# Patient Record
Sex: FEMALE | Race: WHITE | NOT HISPANIC OR LATINO | Employment: UNEMPLOYED | URBAN - METROPOLITAN AREA
[De-identification: names, ages, dates, MRNs, and addresses within clinical notes are randomized per-mention and may not be internally consistent; named-entity substitution may affect disease eponyms.]

---

## 2018-12-16 ENCOUNTER — OFFICE VISIT - RIVER FALLS (OUTPATIENT)
Dept: FAMILY MEDICINE | Facility: CLINIC | Age: 48
End: 2018-12-16

## 2018-12-24 ENCOUNTER — OFFICE VISIT - RIVER FALLS (OUTPATIENT)
Dept: FAMILY MEDICINE | Facility: CLINIC | Age: 48
End: 2018-12-24

## 2019-01-13 ENCOUNTER — OFFICE VISIT - RIVER FALLS (OUTPATIENT)
Dept: FAMILY MEDICINE | Facility: CLINIC | Age: 49
End: 2019-01-13

## 2019-01-14 ENCOUNTER — OFFICE VISIT - RIVER FALLS (OUTPATIENT)
Dept: FAMILY MEDICINE | Facility: CLINIC | Age: 49
End: 2019-01-14

## 2019-01-16 LAB
ANA SER QL IA: NEGATIVE
Lab: NORMAL
Lab: NORMAL
RHEUMATOID FACT SER NEPH-ACNC: <14 IU/ML

## 2019-03-04 ENCOUNTER — AMBULATORY - RIVER FALLS (OUTPATIENT)
Dept: FAMILY MEDICINE | Facility: CLINIC | Age: 49
End: 2019-03-04

## 2019-03-05 LAB
BUN SERPL-MCNC: 18 MG/DL (ref 7–25)
BUN/CREAT RATIO - HISTORICAL: NORMAL (ref 6–22)
CALCIUM SERPL-MCNC: 9.4 MG/DL (ref 8.6–10.2)
CHLORIDE BLD-SCNC: 103 MMOL/L (ref 98–110)
CHOLEST SERPL-MCNC: 181 MG/DL
CHOLEST/HDLC SERPL: 2.3 {RATIO}
CO2 SERPL-SCNC: 23 MMOL/L (ref 20–32)
CREAT SERPL-MCNC: 0.84 MG/DL (ref 0.5–1.1)
EGFRCR SERPLBLD CKD-EPI 2021: 83 ML/MIN/1.73M2
GLUCOSE BLD-MCNC: 85 MG/DL (ref 65–99)
HBA1C MFR BLD: 5.2 %
HDLC SERPL-MCNC: 78 MG/DL
LDLC SERPL CALC-MCNC: 84 MG/DL
NONHDLC SERPL-MCNC: 103 MG/DL
POTASSIUM BLD-SCNC: 4 MMOL/L (ref 3.5–5.3)
SODIUM SERPL-SCNC: 137 MMOL/L (ref 135–146)
TRIGL SERPL-MCNC: 101 MG/DL
TSH SERPL DL<=0.005 MIU/L-ACNC: 1.39 MIU/L

## 2019-03-10 ENCOUNTER — OFFICE VISIT - RIVER FALLS (OUTPATIENT)
Dept: FAMILY MEDICINE | Facility: CLINIC | Age: 49
End: 2019-03-10

## 2019-03-10 ASSESSMENT — MIFFLIN-ST. JEOR: SCORE: 1545.07

## 2019-03-12 ENCOUNTER — OFFICE VISIT - RIVER FALLS (OUTPATIENT)
Dept: FAMILY MEDICINE | Facility: CLINIC | Age: 49
End: 2019-03-12

## 2019-03-12 ASSESSMENT — MIFFLIN-ST. JEOR: SCORE: 1524.21

## 2019-03-16 LAB — HPV HIGH RISK: NOT DETECTED

## 2019-03-29 ENCOUNTER — OFFICE VISIT - RIVER FALLS (OUTPATIENT)
Dept: FAMILY MEDICINE | Facility: CLINIC | Age: 49
End: 2019-03-29

## 2019-04-04 ENCOUNTER — OFFICE VISIT - RIVER FALLS (OUTPATIENT)
Dept: FAMILY MEDICINE | Facility: CLINIC | Age: 49
End: 2019-04-04

## 2019-04-04 ASSESSMENT — MIFFLIN-ST. JEOR: SCORE: 1545.98

## 2019-04-10 ENCOUNTER — OFFICE VISIT - RIVER FALLS (OUTPATIENT)
Dept: FAMILY MEDICINE | Facility: CLINIC | Age: 49
End: 2019-04-10

## 2019-04-15 ENCOUNTER — OFFICE VISIT - RIVER FALLS (OUTPATIENT)
Dept: FAMILY MEDICINE | Facility: CLINIC | Age: 49
End: 2019-04-15

## 2019-04-17 ENCOUNTER — OFFICE VISIT - RIVER FALLS (OUTPATIENT)
Dept: FAMILY MEDICINE | Facility: CLINIC | Age: 49
End: 2019-04-17

## 2019-04-22 ENCOUNTER — OFFICE VISIT - RIVER FALLS (OUTPATIENT)
Dept: FAMILY MEDICINE | Facility: CLINIC | Age: 49
End: 2019-04-22

## 2019-04-22 ASSESSMENT — MIFFLIN-ST. JEOR: SCORE: 1543.26

## 2019-05-07 ENCOUNTER — OFFICE VISIT - RIVER FALLS (OUTPATIENT)
Dept: FAMILY MEDICINE | Facility: CLINIC | Age: 49
End: 2019-05-07

## 2019-05-08 LAB
A/G RATIO - HISTORICAL: 1.4 (ref 1–2.5)
ALBUMIN SERPL-MCNC: 3.8 GM/DL (ref 3.6–5.1)
ALP SERPL-CCNC: 81 UNIT/L (ref 33–115)
ALT SERPL W P-5'-P-CCNC: 15 UNIT/L (ref 6–29)
AST SERPL W P-5'-P-CCNC: 17 UNIT/L (ref 10–35)
BILIRUB SERPL-MCNC: 0.4 MG/DL (ref 0.2–1.2)
BUN SERPL-MCNC: 16 MG/DL (ref 7–25)
BUN/CREAT RATIO - HISTORICAL: NORMAL (ref 6–22)
CALCIUM SERPL-MCNC: 8.9 MG/DL (ref 8.6–10.2)
CHLORIDE BLD-SCNC: 106 MMOL/L (ref 98–110)
CO2 SERPL-SCNC: 23 MMOL/L (ref 20–32)
CREAT SERPL-MCNC: 0.8 MG/DL (ref 0.5–1.1)
EGFRCR SERPLBLD CKD-EPI 2021: 88 ML/MIN/1.73M2
GLOBULIN: 2.7 (ref 1.9–3.7)
GLUCOSE BLD-MCNC: 76 MG/DL (ref 65–99)
POTASSIUM BLD-SCNC: 4.2 MMOL/L (ref 3.5–5.3)
PROT SERPL-MCNC: 6.5 GM/DL (ref 6.1–8.1)
SODIUM SERPL-SCNC: 138 MMOL/L (ref 135–146)
T3FREE SERPL-MCNC: 2.9 PG/ML (ref 2.3–4.2)
T4 FREE SERPL-MCNC: 0.8 NG/DL (ref 0.8–1.8)
TSH SERPL DL<=0.005 MIU/L-ACNC: 2.54 MIU/L

## 2019-05-10 ENCOUNTER — COMMUNICATION - RIVER FALLS (OUTPATIENT)
Dept: FAMILY MEDICINE | Facility: CLINIC | Age: 49
End: 2019-05-10

## 2019-08-01 ENCOUNTER — OFFICE VISIT - RIVER FALLS (OUTPATIENT)
Dept: FAMILY MEDICINE | Facility: CLINIC | Age: 49
End: 2019-08-01

## 2019-10-28 ENCOUNTER — AMBULATORY - RIVER FALLS (OUTPATIENT)
Dept: FAMILY MEDICINE | Facility: CLINIC | Age: 49
End: 2019-10-28

## 2020-01-31 ENCOUNTER — OFFICE VISIT - RIVER FALLS (OUTPATIENT)
Dept: FAMILY MEDICINE | Facility: CLINIC | Age: 50
End: 2020-01-31

## 2020-03-13 ENCOUNTER — OFFICE VISIT - RIVER FALLS (OUTPATIENT)
Dept: FAMILY MEDICINE | Facility: CLINIC | Age: 50
End: 2020-03-13

## 2020-03-13 ASSESSMENT — MIFFLIN-ST. JEOR: SCORE: 1562.31

## 2020-09-03 ENCOUNTER — COMMUNICATION - RIVER FALLS (OUTPATIENT)
Dept: FAMILY MEDICINE | Facility: CLINIC | Age: 50
End: 2020-09-03

## 2020-09-10 ENCOUNTER — OFFICE VISIT - RIVER FALLS (OUTPATIENT)
Dept: FAMILY MEDICINE | Facility: CLINIC | Age: 50
End: 2020-09-10

## 2020-09-11 ENCOUNTER — TRANSCRIBE ORDERS (OUTPATIENT)
Dept: OTHER | Age: 50
End: 2020-09-11

## 2020-09-11 DIAGNOSIS — H55.00 NYSTAGMUS: ICD-10-CM

## 2020-09-11 DIAGNOSIS — Z98.890 HISTORY OF EYE SURGERY: ICD-10-CM

## 2020-09-11 DIAGNOSIS — H53.19 OSCILLOPSIA: Primary | ICD-10-CM

## 2020-09-11 DIAGNOSIS — H50.9 STRABISMUS: ICD-10-CM

## 2021-01-25 ENCOUNTER — OFFICE VISIT - RIVER FALLS (OUTPATIENT)
Dept: FAMILY MEDICINE | Facility: CLINIC | Age: 51
End: 2021-01-25

## 2021-01-25 ASSESSMENT — MIFFLIN-ST. JEOR: SCORE: 1546.22

## 2021-02-08 ENCOUNTER — OFFICE VISIT - RIVER FALLS (OUTPATIENT)
Dept: FAMILY MEDICINE | Facility: CLINIC | Age: 51
End: 2021-02-08

## 2021-02-15 ENCOUNTER — OFFICE VISIT - RIVER FALLS (OUTPATIENT)
Dept: FAMILY MEDICINE | Facility: CLINIC | Age: 51
End: 2021-02-15

## 2021-03-22 ENCOUNTER — OFFICE VISIT - RIVER FALLS (OUTPATIENT)
Dept: FAMILY MEDICINE | Facility: CLINIC | Age: 51
End: 2021-03-22

## 2021-03-22 ASSESSMENT — MIFFLIN-ST. JEOR: SCORE: 1542.59

## 2021-04-06 ENCOUNTER — OFFICE VISIT - RIVER FALLS (OUTPATIENT)
Dept: FAMILY MEDICINE | Facility: CLINIC | Age: 51
End: 2021-04-06

## 2021-04-13 ENCOUNTER — OFFICE VISIT - RIVER FALLS (OUTPATIENT)
Dept: FAMILY MEDICINE | Facility: CLINIC | Age: 51
End: 2021-04-13

## 2021-04-20 ENCOUNTER — COMMUNICATION - RIVER FALLS (OUTPATIENT)
Dept: FAMILY MEDICINE | Facility: CLINIC | Age: 51
End: 2021-04-20

## 2021-04-21 ENCOUNTER — OFFICE VISIT - RIVER FALLS (OUTPATIENT)
Dept: FAMILY MEDICINE | Facility: CLINIC | Age: 51
End: 2021-04-21

## 2021-04-21 ASSESSMENT — MIFFLIN-ST. JEOR: SCORE: 1558.92

## 2021-04-29 ENCOUNTER — AMBULATORY - RIVER FALLS (OUTPATIENT)
Dept: FAMILY MEDICINE | Facility: CLINIC | Age: 51
End: 2021-04-29

## 2021-05-19 ENCOUNTER — OFFICE VISIT - RIVER FALLS (OUTPATIENT)
Dept: FAMILY MEDICINE | Facility: CLINIC | Age: 51
End: 2021-05-19

## 2021-05-19 ASSESSMENT — MIFFLIN-ST. JEOR: SCORE: 1555.74

## 2021-05-27 ENCOUNTER — COMMUNICATION - RIVER FALLS (OUTPATIENT)
Dept: FAMILY MEDICINE | Facility: CLINIC | Age: 51
End: 2021-05-27

## 2021-05-27 ENCOUNTER — AMBULATORY - RIVER FALLS (OUTPATIENT)
Dept: FAMILY MEDICINE | Facility: CLINIC | Age: 51
End: 2021-05-27

## 2021-06-25 ENCOUNTER — COMMUNICATION - RIVER FALLS (OUTPATIENT)
Dept: FAMILY MEDICINE | Facility: CLINIC | Age: 51
End: 2021-06-25

## 2021-06-25 ENCOUNTER — OFFICE VISIT - RIVER FALLS (OUTPATIENT)
Dept: FAMILY MEDICINE | Facility: CLINIC | Age: 51
End: 2021-06-25

## 2021-06-26 LAB
BUN SERPL-MCNC: 14 MG/DL (ref 7–25)
BUN/CREAT RATIO - HISTORICAL: NORMAL (ref 6–22)
CALCIUM SERPL-MCNC: 9.2 MG/DL (ref 8.6–10.2)
CHLORIDE BLD-SCNC: 107 MMOL/L (ref 98–110)
CO2 SERPL-SCNC: 23 MMOL/L (ref 20–32)
CREAT SERPL-MCNC: 0.85 MG/DL (ref 0.5–1.1)
EGFRCR SERPLBLD CKD-EPI 2021: 80 ML/MIN/1.73M2
ERYTHROCYTE [DISTWIDTH] IN BLOOD BY AUTOMATED COUNT: 13.5 % (ref 11–15)
ERYTHROCYTE [SEDIMENTATION RATE] IN BLOOD BY WESTERGREN METHOD: 6 MM/H
GLUCOSE BLD-MCNC: 84 MG/DL (ref 65–99)
HCT VFR BLD AUTO: 30.4 % (ref 35–45)
HGB BLD-MCNC: 9.5 GM/DL (ref 11.7–15.5)
MCH RBC QN AUTO: 25.4 PG (ref 27–33)
MCHC RBC AUTO-ENTMCNC: 31.3 GM/DL (ref 32–36)
MCV RBC AUTO: 81.3 FL (ref 80–100)
PLATELET # BLD AUTO: 339 10*3/UL (ref 140–400)
PMV BLD: 10.2 FL (ref 7.5–12.5)
POTASSIUM BLD-SCNC: 4.2 MMOL/L (ref 3.5–5.3)
RBC # BLD AUTO: 3.74 10*6/UL (ref 3.8–5.1)
SODIUM SERPL-SCNC: 137 MMOL/L (ref 135–146)
WBC # BLD AUTO: 7 10*3/UL (ref 3.8–10.8)

## 2021-06-30 ENCOUNTER — COMMUNICATION - RIVER FALLS (OUTPATIENT)
Dept: FAMILY MEDICINE | Facility: CLINIC | Age: 51
End: 2021-06-30

## 2021-07-01 ENCOUNTER — COMMUNICATION - RIVER FALLS (OUTPATIENT)
Dept: FAMILY MEDICINE | Facility: CLINIC | Age: 51
End: 2021-07-01

## 2021-07-05 ENCOUNTER — OFFICE VISIT - RIVER FALLS (OUTPATIENT)
Dept: FAMILY MEDICINE | Facility: CLINIC | Age: 51
End: 2021-07-05

## 2021-07-05 ASSESSMENT — MIFFLIN-ST. JEOR: SCORE: 1550.75

## 2021-08-02 ENCOUNTER — TRANSFERRED RECORDS (OUTPATIENT)
Dept: HEALTH INFORMATION MANAGEMENT | Facility: CLINIC | Age: 51
End: 2021-08-02

## 2021-08-30 ENCOUNTER — COMMUNICATION - RIVER FALLS (OUTPATIENT)
Dept: FAMILY MEDICINE | Facility: CLINIC | Age: 51
End: 2021-08-30

## 2021-08-31 ENCOUNTER — AMBULATORY - RIVER FALLS (OUTPATIENT)
Dept: FAMILY MEDICINE | Facility: CLINIC | Age: 51
End: 2021-08-31

## 2021-09-01 LAB
% RETIC: 2.2 %
FERRITIN SERPL-MCNC: 31 NG/ML (ref 16–232)
HGB BLD-MCNC: 12.1 GM/DL (ref 11.7–15.5)
RETIC (ABSOLUTE) - HISTORICAL: ABNORMAL (ref 20000–80000)

## 2021-09-02 ENCOUNTER — COMMUNICATION - RIVER FALLS (OUTPATIENT)
Dept: FAMILY MEDICINE | Facility: CLINIC | Age: 51
End: 2021-09-02

## 2021-10-04 ENCOUNTER — OFFICE VISIT - RIVER FALLS (OUTPATIENT)
Dept: FAMILY MEDICINE | Facility: CLINIC | Age: 51
End: 2021-10-04

## 2021-10-04 ASSESSMENT — MIFFLIN-ST. JEOR: SCORE: 1557.1

## 2021-10-17 ENCOUNTER — COMMUNICATION - RIVER FALLS (OUTPATIENT)
Dept: FAMILY MEDICINE | Facility: CLINIC | Age: 51
End: 2021-10-17

## 2021-10-19 ENCOUNTER — OFFICE VISIT - RIVER FALLS (OUTPATIENT)
Dept: FAMILY MEDICINE | Facility: CLINIC | Age: 51
End: 2021-10-19

## 2021-10-19 ASSESSMENT — MIFFLIN-ST. JEOR: SCORE: 1575.7

## 2021-11-03 ENCOUNTER — OFFICE VISIT - RIVER FALLS (OUTPATIENT)
Dept: FAMILY MEDICINE | Facility: CLINIC | Age: 51
End: 2021-11-03

## 2021-11-03 ASSESSMENT — MIFFLIN-ST. JEOR: SCORE: 1569.8

## 2021-11-22 ENCOUNTER — VIRTUAL VISIT (OUTPATIENT)
Dept: BEHAVIORAL HEALTH | Facility: TELEHEALTH | Age: 51
End: 2021-11-22
Payer: COMMERCIAL

## 2021-11-22 DIAGNOSIS — F41.9 ANXIETY DISORDER, UNSPECIFIED TYPE: Primary | ICD-10-CM

## 2021-11-22 PROCEDURE — 90832 PSYTX W PT 30 MINUTES: CPT | Mod: 95 | Performed by: SOCIAL WORKER

## 2021-11-22 ASSESSMENT — COLUMBIA-SUICIDE SEVERITY RATING SCALE - C-SSRS
TOTAL  NUMBER OF INTERRUPTED ATTEMPTS PAST 3 MONTHS: NO
TOTAL  NUMBER OF ABORTED OR SELF INTERRUPTED ATTEMPTS PAST LIFETIME: NO
2. HAVE YOU ACTUALLY HAD ANY THOUGHTS OF KILLING YOURSELF LIFETIME?: NO
5. HAVE YOU STARTED TO WORK OUT OR WORKED OUT THE DETAILS OF HOW TO KILL YOURSELF? DO YOU INTEND TO CARRY OUT THIS PLAN?: NO
ATTEMPT LIFETIME: NO
1. IN THE PAST MONTH, HAVE YOU WISHED YOU WERE DEAD OR WISHED YOU COULD GO TO SLEEP AND NOT WAKE UP?: NO
ATTEMPT PAST THREE MONTHS: NO
5. HAVE YOU STARTED TO WORK OUT OR WORKED OUT THE DETAILS OF HOW TO KILL YOURSELF? DO YOU INTEND TO CARRY OUT THIS PLAN?: NO
6. HAVE YOU EVER DONE ANYTHING, STARTED TO DO ANYTHING, OR PREPARED TO DO ANYTHING TO END YOUR LIFE?: NO
TOTAL  NUMBER OF ABORTED OR SELF INTERRUPTED ATTEMPTS PAST 3 MONTHS: NO
TOTAL  NUMBER OF INTERRUPTED ATTEMPTS LIFETIME: NO
3. HAVE YOU BEEN THINKING ABOUT HOW YOU MIGHT KILL YOURSELF?: NO
2. HAVE YOU ACTUALLY HAD ANY THOUGHTS OF KILLING YOURSELF?: NO
1. IN THE PAST MONTH, HAVE YOU WISHED YOU WERE DEAD OR WISHED YOU COULD GO TO SLEEP AND NOT WAKE UP?: NO
4. HAVE YOU HAD THESE THOUGHTS AND HAD SOME INTENTION OF ACTING ON THEM?: NO
4. HAVE YOU HAD THESE THOUGHTS AND HAD SOME INTENTION OF ACTING ON THEM?: NO
6. HAVE YOU EVER DONE ANYTHING, STARTED TO DO ANYTHING, OR PREPARED TO DO ANYTHING TO END YOUR LIFE?: NO

## 2021-11-22 ASSESSMENT — ANXIETY QUESTIONNAIRES
6. BECOMING EASILY ANNOYED OR IRRITABLE: MORE THAN HALF THE DAYS
4. TROUBLE RELAXING: NEARLY EVERY DAY
GAD7 TOTAL SCORE: 17
8. IF YOU CHECKED OFF ANY PROBLEMS, HOW DIFFICULT HAVE THESE MADE IT FOR YOU TO DO YOUR WORK, TAKE CARE OF THINGS AT HOME, OR GET ALONG WITH OTHER PEOPLE?: VERY DIFFICULT
1. FEELING NERVOUS, ANXIOUS, OR ON EDGE: NEARLY EVERY DAY
2. NOT BEING ABLE TO STOP OR CONTROL WORRYING: NEARLY EVERY DAY
5. BEING SO RESTLESS THAT IT IS HARD TO SIT STILL: MORE THAN HALF THE DAYS
GAD7 TOTAL SCORE: 17
7. FEELING AFRAID AS IF SOMETHING AWFUL MIGHT HAPPEN: SEVERAL DAYS
GAD7 TOTAL SCORE: 17
3. WORRYING TOO MUCH ABOUT DIFFERENT THINGS: NEARLY EVERY DAY
7. FEELING AFRAID AS IF SOMETHING AWFUL MIGHT HAPPEN: SEVERAL DAYS

## 2021-11-22 ASSESSMENT — PATIENT HEALTH QUESTIONNAIRE - PHQ9
SUM OF ALL RESPONSES TO PHQ QUESTIONS 1-9: 17
10. IF YOU CHECKED OFF ANY PROBLEMS, HOW DIFFICULT HAVE THESE PROBLEMS MADE IT FOR YOU TO DO YOUR WORK, TAKE CARE OF THINGS AT HOME, OR GET ALONG WITH OTHER PEOPLE: SOMEWHAT DIFFICULT
SUM OF ALL RESPONSES TO PHQ QUESTIONS 1-9: 17
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
## 2021-11-23 ASSESSMENT — ANXIETY QUESTIONNAIRES: GAD7 TOTAL SCORE: 17

## 2021-11-23 ASSESSMENT — PATIENT HEALTH QUESTIONNAIRE - PHQ9: SUM OF ALL RESPONSES TO PHQ QUESTIONS 1-9: 17

## 2021-11-30 ENCOUNTER — OFFICE VISIT - RIVER FALLS (OUTPATIENT)
Dept: FAMILY MEDICINE | Facility: CLINIC | Age: 51
End: 2021-11-30

## 2021-11-30 ASSESSMENT — MIFFLIN-ST. JEOR: SCORE: 1571.16

## 2021-12-05 ENCOUNTER — HEALTH MAINTENANCE LETTER (OUTPATIENT)
Age: 51
End: 2021-12-05

## 2021-12-06 ENCOUNTER — VIRTUAL VISIT (OUTPATIENT)
Dept: BEHAVIORAL HEALTH | Facility: TELEHEALTH | Age: 51
End: 2021-12-06

## 2021-12-06 DIAGNOSIS — F41.1 GENERALIZED ANXIETY DISORDER: Primary | ICD-10-CM

## 2021-12-06 PROCEDURE — 90791 PSYCH DIAGNOSTIC EVALUATION: CPT | Mod: 95 | Performed by: SOCIAL WORKER
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
## 2021-12-13 ENCOUNTER — AMBULATORY - RIVER FALLS (OUTPATIENT)
Dept: FAMILY MEDICINE | Facility: CLINIC | Age: 51
End: 2021-12-13

## 2022-01-17 ENCOUNTER — TRANSFERRED RECORDS (OUTPATIENT)
Dept: HEALTH INFORMATION MANAGEMENT | Facility: CLINIC | Age: 52
End: 2022-01-17

## 2022-01-17 LAB — TSH SERPL-ACNC: 2.1 MIU/L (ref 0.4–4.5)

## 2022-02-11 VITALS
TEMPERATURE: 98.3 F | WEIGHT: 197 LBS | HEART RATE: 82 BPM | BODY MASS INDEX: 30.92 KG/M2 | HEIGHT: 67 IN | DIASTOLIC BLOOD PRESSURE: 92 MMHG | SYSTOLIC BLOOD PRESSURE: 142 MMHG | TEMPERATURE: 98.2 F | HEIGHT: 67 IN | BODY MASS INDEX: 30.85 KG/M2 | SYSTOLIC BLOOD PRESSURE: 132 MMHG | DIASTOLIC BLOOD PRESSURE: 84 MMHG | HEART RATE: 82 BPM

## 2022-02-12 VITALS
TEMPERATURE: 98.4 F | BODY MASS INDEX: 30.86 KG/M2 | BODY MASS INDEX: 28.85 KG/M2 | HEART RATE: 64 BPM | HEIGHT: 68 IN | SYSTOLIC BLOOD PRESSURE: 122 MMHG | WEIGHT: 197.6 LBS | HEIGHT: 68 IN | SYSTOLIC BLOOD PRESSURE: 124 MMHG | TEMPERATURE: 97.3 F | BODY MASS INDEX: 30.06 KG/M2 | SYSTOLIC BLOOD PRESSURE: 124 MMHG | OXYGEN SATURATION: 97 % | BODY MASS INDEX: 29.55 KG/M2 | HEIGHT: 68 IN | OXYGEN SATURATION: 99 % | WEIGHT: 190.4 LBS | OXYGEN SATURATION: 98 % | DIASTOLIC BLOOD PRESSURE: 80 MMHG | WEIGHT: 200 LBS | HEART RATE: 98 BPM | SYSTOLIC BLOOD PRESSURE: 170 MMHG | HEART RATE: 80 BPM | SYSTOLIC BLOOD PRESSURE: 118 MMHG | TEMPERATURE: 97.7 F | DIASTOLIC BLOOD PRESSURE: 70 MMHG | WEIGHT: 195 LBS | DIASTOLIC BLOOD PRESSURE: 74 MMHG | SYSTOLIC BLOOD PRESSURE: 126 MMHG | DIASTOLIC BLOOD PRESSURE: 82 MMHG | OXYGEN SATURATION: 98 % | DIASTOLIC BLOOD PRESSURE: 82 MMHG | TEMPERATURE: 98.8 F | HEART RATE: 92 BPM | HEART RATE: 98 BPM | BODY MASS INDEX: 30.49 KG/M2 | WEIGHT: 194.8 LBS | WEIGHT: 194.6 LBS | OXYGEN SATURATION: 99 % | HEART RATE: 90 BPM | TEMPERATURE: 98.8 F | DIASTOLIC BLOOD PRESSURE: 80 MMHG | SYSTOLIC BLOOD PRESSURE: 130 MMHG | DIASTOLIC BLOOD PRESSURE: 80 MMHG | TEMPERATURE: 97.8 F | SYSTOLIC BLOOD PRESSURE: 118 MMHG | TEMPERATURE: 98.1 F | WEIGHT: 198.6 LBS | DIASTOLIC BLOOD PRESSURE: 88 MMHG | HEART RATE: 89 BPM | BODY MASS INDEX: 30.65 KG/M2 | HEIGHT: 68 IN | HEART RATE: 89 BPM | BODY MASS INDEX: 29.58 KG/M2 | WEIGHT: 195.2 LBS | BODY MASS INDEX: 29.49 KG/M2

## 2022-02-12 VITALS
TEMPERATURE: 97.5 F | HEIGHT: 67 IN | HEART RATE: 77 BPM | TEMPERATURE: 97.3 F | DIASTOLIC BLOOD PRESSURE: 92 MMHG | HEART RATE: 76 BPM | OXYGEN SATURATION: 98 % | OXYGEN SATURATION: 97 % | WEIGHT: 199.8 LBS | HEART RATE: 84 BPM | BODY MASS INDEX: 30.85 KG/M2 | TEMPERATURE: 98.8 F | HEIGHT: 67 IN | BODY MASS INDEX: 31.08 KG/M2 | WEIGHT: 198 LBS | SYSTOLIC BLOOD PRESSURE: 142 MMHG | HEIGHT: 67 IN | SYSTOLIC BLOOD PRESSURE: 164 MMHG | DIASTOLIC BLOOD PRESSURE: 86 MMHG | BODY MASS INDEX: 31.01 KG/M2 | TEMPERATURE: 97.5 F | BODY MASS INDEX: 31.36 KG/M2 | DIASTOLIC BLOOD PRESSURE: 99 MMHG | WEIGHT: 196.2 LBS | OXYGEN SATURATION: 99 % | DIASTOLIC BLOOD PRESSURE: 78 MMHG | DIASTOLIC BLOOD PRESSURE: 68 MMHG | BODY MASS INDEX: 30.79 KG/M2 | SYSTOLIC BLOOD PRESSURE: 154 MMHG | HEART RATE: 86 BPM | SYSTOLIC BLOOD PRESSURE: 142 MMHG | HEIGHT: 67 IN | OXYGEN SATURATION: 97 % | TEMPERATURE: 97 F | SYSTOLIC BLOOD PRESSURE: 128 MMHG | HEIGHT: 67 IN | HEART RATE: 66 BPM

## 2022-02-12 VITALS
DIASTOLIC BLOOD PRESSURE: 80 MMHG | HEART RATE: 93 BPM | DIASTOLIC BLOOD PRESSURE: 88 MMHG | OXYGEN SATURATION: 97 % | WEIGHT: 191.6 LBS | TEMPERATURE: 97 F | SYSTOLIC BLOOD PRESSURE: 122 MMHG | OXYGEN SATURATION: 99 % | WEIGHT: 196.6 LBS | HEART RATE: 90 BPM | HEART RATE: 82 BPM | SYSTOLIC BLOOD PRESSURE: 112 MMHG | BODY MASS INDEX: 30.34 KG/M2 | SYSTOLIC BLOOD PRESSURE: 132 MMHG | OXYGEN SATURATION: 99 % | BODY MASS INDEX: 29.78 KG/M2 | TEMPERATURE: 96.8 F | DIASTOLIC BLOOD PRESSURE: 80 MMHG | TEMPERATURE: 98.6 F | BODY MASS INDEX: 29.57 KG/M2 | WEIGHT: 193 LBS

## 2022-02-12 VITALS
HEART RATE: 81 BPM | BODY MASS INDEX: 31.78 KG/M2 | WEIGHT: 202.5 LBS | TEMPERATURE: 97 F | DIASTOLIC BLOOD PRESSURE: 90 MMHG | SYSTOLIC BLOOD PRESSURE: 144 MMHG | HEIGHT: 67 IN

## 2022-02-12 VITALS
WEIGHT: 200.7 LBS | BODY MASS INDEX: 31.08 KG/M2 | HEART RATE: 81 BPM | BODY MASS INDEX: 31.43 KG/M2 | SYSTOLIC BLOOD PRESSURE: 115 MMHG | RESPIRATION RATE: 16 BRPM | OXYGEN SATURATION: 100 % | TEMPERATURE: 98.2 F | DIASTOLIC BLOOD PRESSURE: 80 MMHG | HEIGHT: 67 IN | HEIGHT: 67 IN | DIASTOLIC BLOOD PRESSURE: 83 MMHG | TEMPERATURE: 99.1 F | HEART RATE: 81 BPM | SYSTOLIC BLOOD PRESSURE: 133 MMHG | WEIGHT: 199.1 LBS | BODY MASS INDEX: 31.25 KG/M2 | SYSTOLIC BLOOD PRESSURE: 124 MMHG | DIASTOLIC BLOOD PRESSURE: 78 MMHG | OXYGEN SATURATION: 100 % | HEART RATE: 86 BPM | WEIGHT: 198 LBS

## 2022-02-12 VITALS
HEIGHT: 67 IN | BODY MASS INDEX: 31.74 KG/M2 | OXYGEN SATURATION: 98 % | WEIGHT: 199.4 LBS | HEART RATE: 80 BPM | HEIGHT: 67 IN | SYSTOLIC BLOOD PRESSURE: 124 MMHG | DIASTOLIC BLOOD PRESSURE: 82 MMHG | HEART RATE: 67 BPM | SYSTOLIC BLOOD PRESSURE: 122 MMHG | OXYGEN SATURATION: 99 % | WEIGHT: 202.2 LBS | BODY MASS INDEX: 31.3 KG/M2 | BODY MASS INDEX: 31.94 KG/M2 | HEART RATE: 72 BPM | TEMPERATURE: 96.1 F | HEIGHT: 67 IN | WEIGHT: 203.5 LBS | SYSTOLIC BLOOD PRESSURE: 126 MMHG | DIASTOLIC BLOOD PRESSURE: 82 MMHG | TEMPERATURE: 98.2 F | DIASTOLIC BLOOD PRESSURE: 82 MMHG

## 2022-02-12 VITALS
DIASTOLIC BLOOD PRESSURE: 68 MMHG | SYSTOLIC BLOOD PRESSURE: 120 MMHG | HEIGHT: 68 IN | WEIGHT: 198.8 LBS | BODY MASS INDEX: 30.13 KG/M2 | HEART RATE: 80 BPM

## 2022-02-12 VITALS
DIASTOLIC BLOOD PRESSURE: 76 MMHG | SYSTOLIC BLOOD PRESSURE: 124 MMHG | HEART RATE: 88 BPM | WEIGHT: 207 LBS | BODY MASS INDEX: 31.94 KG/M2

## 2022-02-12 VITALS
TEMPERATURE: 98.3 F | BODY MASS INDEX: 29.35 KG/M2 | SYSTOLIC BLOOD PRESSURE: 118 MMHG | DIASTOLIC BLOOD PRESSURE: 78 MMHG | OXYGEN SATURATION: 98 % | HEART RATE: 92 BPM | WEIGHT: 190.2 LBS

## 2022-02-12 VITALS
DIASTOLIC BLOOD PRESSURE: 72 MMHG | SYSTOLIC BLOOD PRESSURE: 120 MMHG | HEART RATE: 79 BPM | OXYGEN SATURATION: 7 % | WEIGHT: 198 LBS | TEMPERATURE: 98 F | BODY MASS INDEX: 30.55 KG/M2

## 2022-02-15 NOTE — TELEPHONE ENCOUNTER
---------------------  From: Sandra Triana MA ([facility] Message Pool ([id number]_[facility]))   To: Appointment Pool ([id number]_[place]); Unit 2 Pool ([facility]24_[facility]) ;     Sent: 5/19/2021 12:20:56 PM CDT  Subject: Ambulatory BP monitor     RTC order placed for pt to get set up for ambulatory BPM per [facility].  Please call pt to help get set up.  Thanks.---------------------  From: Harry VYAS, Adeline LOPEZ (Unit 2 Pool ([facility]) )   To: [facility] Message Pool ([facility]);     Sent: 5/19/2021 12:23:54 PM CDT  Subject: RE: Ambulatory BP monitor     We do not have ambulatory blood pressure monitor.  Pt will have to be referred to a [facility] location that has one available.  I believe [place] or [place]?---------------------  From: Sandra Triana MA ([facility] Message Pool ([facility]))   To: Unit 2 Pool ([address] [place] [address]) ;     Sent: 5/19/2021 12:57:17 PM CDT  Subject: RE: Ambulatory BP monitor     Ok, thanks.  I'll place a referral for one.  ** Submitted: **  Order:Referral (Request)  Details:  5/19/2021 12:57 PM CDT, Referred to: Cardiology, Reason for referral: Ambulatory BP monitor per [facility], Labile blood pressure         Signed by Sandra Triana MA  5/19/2021 5:57:00 PM UT    ** Submitted: **  Discontinue:Return to Clinic (Request)  Details:           Signed by Sandra Triana MA  5/19/2021 5:57:00 PM UTC

## 2022-02-15 NOTE — PROGRESS NOTES
Chief Complaint    Medical FU. FU labs. BP and HR concerns.  History of Present Illness       Patient is here for follow-up of headache and myofascial neck pain.  Saw neurology and was referred to physical therapy after further imaging.  He feels that she is improved.  She was incidentally found to have iron deficiency anemia proved with iron.  Periods are regular occasionally heavy.  Occasional blood on the tissue from wiping but no spontaneous hematochezia or melena.  No abdominal pain.  No nausea, vomiting, heartburn.  She brings home blood pressure and pulse monitoring which show normal readings.  Review of Systems       Fever, chills, chest pain, dyspnea.  Physical Exam   Vitals & Measurements    T: 98.2  F (Tympanic)  HR: 80 (Peripheral)  BP: 124/82  SpO2: 99%     HT: 67 in  HT: 67.5 in  WT: 199.4 lb  BMI: 31.23        Patient appears comfortable.  Alert and oriented.  HEENT exam is Mallampati 2.  Supple no adenopathy or thyromegaly.  Chest clear.  Cardiac exam is regular no murmur or edema.  Abdomen soft and nontender.  Assessment/Plan       Cervical myofascial pain syndrome (M79.18)          Proved         Ordered:          72613 office o/p est low 20-29 min (Charge), Quantity: 1, Screening for colon cancer  Iron deficiency anemia  Cervical myofascial pain syndrome  Migraine headache                Hematochezia (K92.1)                Iron deficiency anemia (D50.9)          Proved         Ordered:          60586 office o/p est low 20-29 min (Charge), Quantity: 1, Screening for colon cancer  Iron deficiency anemia  Cervical myofascial pain syndrome  Migraine headache          Return to Clinic (Request), RFV: hgb, ferritin, Return in 6-12 months                Migraine headache (G43.909)          Improved         Ordered:          05799 office o/p est low 20-29 min (Charge), Quantity: 1, Screening for colon cancer  Iron deficiency anemia  Cervical myofascial pain syndrome  Migraine headache                 Screening for colon cancer (Z12.11)          Colonoscopy          Ordered:          08819 office o/p est low 20-29 min (Charge), Quantity: 1, Screening for colon cancer  Iron deficiency anemia  Cervical myofascial pain syndrome  Migraine headache           Patient Information     Name:ELI ECHOLS      Address:      [address] [id number]     Sex:Female     YOB: 1970     [phone]     Emergency Contact:IFTIKHAR ECHOLS     MRN:[id number]     FIN:[id number]     Location:[facility]     Date of Service:10/04/2021      Primary Care Physician:       Cris Gomez MD, [phone]      Attending Physician:       Дмитрий Umaña MD, [phone]  Problem List/Past Medical History    Ongoing     Bipolar 1 disorder     Cervical myofascial pain syndrome     Chronic tension headache     SANTIAGO (generalized anxiety disorder)     History of eye surgery     Hypomania     Iron deficiency anemia     Migraine headache     Obesity     Oscillopsia    Historical     Inpatient stay       Comments: @[facility], [place] - Hypomania - Bipolar 2 versus cyclothymia     Pregnancy     Pregnancy     Pregnancy     Pregnancy     Pregnancy     Pregnancy  Procedure/Surgical History      delivery NOS, unspecified (2011)     Other Postsurgical Status (2011)      section     Tonsillectomy  Medications    Claritin-D 12 Hour oral tablet, extended release, 1 tab(s), Oral, q12 hrs, PRN    clonazePAM 1 mg oral tablet, 1 mg= 1 tab(s), Oral, bid, 1 refills    ferrous sulfate 325 mg (65 mg elemental iron) oral delayed release tablet, 325 mg= 1 tab(s), Oral, daily    LaMICtal 100 mg oral tablet, 200 mg= 2 tab(s), Oral, daily, 3 refills    QUEtiapine 25 mg oral tablet, 25 mg= 1 tab(s)  Allergies    Depakote (Diarrhea)    codeine  Social History    Smoking Status     Never smoker     Alcohol      Never     Electronic Cigarette/Vaping      Electronic Cigarette  Use: Never.     Employment/School      Highest education level: University degree(s).     Exercise      Exercise frequency: Daily. Exercise type: Walking dogs, elliptical.     Home/Environment      Marital status: . Living situation: Home/Independent. Injuries/Abuse/Neglect in household: No.     Nutrition/Health      Type of diet: Regular.     Sexual      Identifies as female, Sexual orientation: Straight or heterosexual. History of STD: No. Uses condoms: No. Contraceptive Use Details: Birth control pill. History of sexual abuse: No.     Substance Abuse      Never     Tobacco      Never (less than 100 in lifetime)  Family History    Cancer of adrenal gland: Father.    Heart disease: Father.    Hypercholesterolemia: Father.    Lung disorder: Father.  Lab Results          Lab Results (Last 4 results within 90 days)           Ferritin: 31 ng/mL [16 ng/mL - 232 ng/mL] (08/31/21 12:05:00)          Hgb: 12.1 gm/dL [11.7 gm/dL - 15.5 gm/dL] (08/31/21 12:05:00)          Reticulocyte: 2.2 % (08/31/21 12:05:00)          Retic Abs#: [id number] High [11668  - 48322] (08/31/21 12:05:00)  Immunizations          Scheduled Immunizations          Dose Date(s)          influenza virus vaccine, inactivated          09/21/2020, 09/16/2021          influenza, H1N1, live          12/09/2009          MMR (measles/mumps/rubella)          06/22/2007          Td          04/12/2001          Other Immunizations          Hep B          06/22/2007, 08/22/2007          influenza          01/13/2018          MMR (measles/mumps/rubella)          08/22/2007          influenza virus vaccine, inactivated          10/28/2019          Td          06/13/2015          meningococcal conjugate vaccine          08/22/2007          SARS-CoV-2 (COVID-19) Moderna-1273          04/29/2021, 05/27/2021

## 2022-02-15 NOTE — PROGRESS NOTES
History of Present Illness          HPI pt has history of chronic headaches.  They start on the right occipital head and squeeze to the side of her head, she also has pain behind her eyes.  Her eye pain improves if she applies pressure to the upper inner nasal bones.  this does not improve the pain at the back or side of her head.  she has a history of strabismus and has had several surgeries.  she feels she can drive only about an hour before her eye pain headache worsens, she was seen by optho in march and glasses were not recommended at that time.         the headaches will make her feel exhausted.  pain today is 5-6/10, squeezing, and have been occuring almost daily, imitrex has not helped, prednsione helped the headache behind the eye      discussed with pt r/b/a of trigger point injections and occipital nerve block, she reports a needle phobia, would like to have procedure done today but felt she would need some lorazepam, she does have a  with her today, provided Rx for 1 ativan, she went to pick this up then returned to have procedure done.  We waited for 1 hour after taking the lorazepam to do the procedures/      she drinks a lot of water and was told by an associate [id number] she might have sjorgren's syndrome          Review of systems is negative except as per HPI for 12 point ros      Exam:      General: alert and oriented ×3 no acute distress.      HEENT: pupils are equal round and reactive to light extraocular motion is intact. Normocephalic and atraumatic.       Hearing is grossly normal and there is no otorrhea.       Nares are patent there is no rhinorrhea.       Mucous membranes are moist and pink.      no sinus tenderness      Chest: has bilateral rise with no increased work of breathing.      Cardiovascular: normal perfusion and brisk capillary refill.      Musculoskeletal: no gross focal abnormalities, moves all 4 extremeties, and normal gait.  ttp at right occipital head and rightupper  traps with hypertonicity noted and some symptomatic trigger points identified.      Neuro: no gross focal abnormalities and memory seems intact.  nml bicep, tricep and patellar reflexes, nml sensation and motor control, nml gait      Psychiatric: speech is clear and coherent and fluent. Patient dressed appropriately for the weather. Mood is appropriate and affect is full.           Procedure note           Informed consent obtained including risks of pain, bleeding, infection, injury to surrounding tissue, and need for further treatment. Benefit of a trigger point injection goal is to reduce pain. This is just part of a treatment plan and for best results patient should also complete physical therapy. I cannot guarantee that will will be any pain relief.           Alternatives would include doing nothing, physical therapy, acupuncture, using heat or ice, continuing with oral medications such as muscle relaxants or nonsteroidal anti-inflammatory medications or topical medications such as a lidocaine patch or cream or menthol for diclofenac. She would like to try the trigger point injections.           Prep: Alcohol          Location identified by my palpation and communication with patient.  Symptomatic trigger points that patient desired treatment at were located at: right upper trapezius and occipital head.                      Each of these was injected with  a one-to-one solution of 1% lidocaine without epinephrine and 0.5% Marcaine without epinephrine.          Total number of injections:2          Total number of milliliters of the 1:1 solution of lidocaine and marcaine:2           Encouraged patient to treat the area with [place] cup ice massage tonight and to try to stretch the area out.           pain prior to treatment: moderate          pain following treatment at the trigger point injection sites:resolved          Complications: none          Tolerated procedure well.      Procedure Note: Right Occipital  nerve block      Informed consent obtained including risks of pain, bleeding, infection, injury to surrounding tissue and need for  further treatment, benefit hopefully reduce pain, no guarantee made, alternatives, doing nothing, trying oral medications such as NSAIDS, TCAs, gabapentin, using ice, trying PT, yoga, taty chi, referral to another provider.       Location: painful and tender occipital head overlying greater occipital nerve were identified by palpation with communication from patient             Number of injections:1             pain prior to procedure:   moderate              pain following procedure: resolved             number of milliliters of the 1:1 solution of 1% lidocaine without epinephrine and 0.5% Marcaine without epinephrine used in total: 3           Assessment/Plan       Dry mouth (R68.2)                Myalgia (M79.10)         Ordered:          DULoxetine, See Instructions, Instructions: 1 cap(s) Oral qday x 1 week then increase to BID, # 60 EA, 1 Refill(s), Type: Maintenance, Pharmacy: [facility], 1 cap(s) Oral qday x 1 week then increase to BID, (Ordered)          Return to Clinic (Request), RFV: 2-3 weeks follow up headaches                Neck pain (M54.2)         Ordered:          Physical Therapy Evaluation and Treatment (Request), Tension headache  Neck pain                Occipital neuralgia (M54.81)                Tension headache (G44.209)          the headache behind her eyes did not change with our injections today, suspect these are more related to her hx of strabismus and eye strain.  would like her to f/u with her eye doctor.  also offered ct sinuses, but pt declined for now.         Ordered:          DULoxetine, See Instructions, Instructions: 1 cap(s) Oral qday x 1 week then increase to BID, # 60 EA, 1 Refill(s), Type: Maintenance, Pharmacy: [facility], 1 cap(s) Oral qday x 1 week then increase to BID, (Ordered)          Physical Therapy Evaluation and Treatment  (Request), Tension headache  Neck pain          Return to Clinic (Request), RFV: 2-3 weeks follow up headaches                Orders:         LORazepam, = 1 tab(s) ( 1 mg ), Oral, once, # 1 tab(s), 0 Refill(s), Type: Soft Stop, Pharmacy: [facility], 1 tab(s) Oral once, (Ordered)         Return to Clinic (Request), RFV: YUMIKO screen IFA reflex titer sjorgren panel 1 lab only      RTC if symptoms worsen or do not improve.  25 minutes spent with patient in direct face to face contact, in addition to the time spent performing the procedures today, greater than 50% of that time was  spent counseling and coordinating care.   Patient Information     Name:ELI ECHOLS      Address:      [address] [address]     Sex:Female     YOB: 1970     [phone]     MRN:[id number]     FIN:[id number]     Location:[facility]     Date of Service:2018      Primary Care Physician:       NONE ,       Attending Physician:       Jason THOMAS [facility], [phone]  Problem List/Past Medical History    Ongoing     Obesity    Historical     Pregnancy  Procedure/Surgical History      delivery NOS, unspecified (2011)           Other Postsurgical Status (2011)        Medications     Imitrex 25 mg oral tablet: See Instructions, 1 tab(s) PO Once  may repeat dose in 2 hours if needed, PRN: for migraine headache, 9 tab(s), 0 Refill(s).     Ativan 1 mg oral tablet: 1 mg, 1 tab(s), Oral, once, 1 tab(s), 0 Refill(s).     DULoxetine 30 mg oral delayed release capsule: See Instructions, 1 cap(s) Oral qday x 1 week then increase to BID, 60 EA, 1 Refill(s).          Allergies    codeine  Social History    Smoking Status - 2018     Never smoker

## 2022-02-15 NOTE — NURSING NOTE
Comprehensive Intake Entered On:  7/5/2021 2:33 PM CDT    Performed On:  7/5/2021 2:29 PM CDT by Keisha Bergeron               Summary   Chief Complaint :   f/u labs.   Menstrual Status :   Menarcheal   Weight Measured :   198.0 lb(Converted to: 198 lb 0 oz, 89.811 kg)    Height Measured :   67 in(Converted to: 5 ft 7 in, 170.18 cm)    Body Mass Index :   31.01 kg/m2 (HI)    Body Surface Area :   2.06 m2   Height/Length Estimated :   67.5 in(Converted to: 5 ft 7 in, 171.45 cm)    Systolic Blood Pressure :   133 mmHg (HI)    Diastolic Blood Pressure :   83 mmHg (HI)    Mean Arterial Pressure :   100 mmHg   Peripheral Pulse Rate :   86 bpm   BP Site :   Right arm   BP Method :   Electronic   HR Method :   Electronic   Temperature Tympanic :   99.1 DegF(Converted to: 37.3 DegC)    Keisha Bergeron - 7/5/2021 2:29 PM CDT   Health Status   Allergies Verified? :   Yes   Medication History Verified? :   Yes   Medical History Verified? :   Yes   Pre-Visit Planning Status :   Completed   Tobacco Use? :   Never smoker   Keisha Bergeron - 7/5/2021 2:29 PM CDT   Consents   Consent for Immunization Exchange :   Consent Granted   Consent for Immunizations to Providers :   Consent Granted   Keisha Bergeron - 7/5/2021 2:29 PM CDT   Meds / Allergies   (As Of: 7/5/2021 2:33:42 PM CDT)   Allergies (Active)   codeine  Estimated Onset Date:   Unspecified ; Created By:   Generated Domain User for [id number]; Reaction Status:   Active ; Substance:   codeine ; Updated By:   Generated Domain User for [id number]; Source:   Patient ; Reviewed Date:   7/5/2021 2:32 PM CDT      Depakote  Estimated Onset Date:   Unspecified ; Reactions:   Diarrhea ; Comments:     Comment 1: developed severe diarrhea at high doses   ; Created By:   Cris Gomez MD; Reaction Status:   Active ; Category:   Drug ; Substance:   Depakote ; Type:   Secondary Effect ; Updated By:   Cris Gomez MD; Reviewed Date:   7/5/2021 2:32 PM CDT        Medication  List   (As Of: 7/5/2021 2:33:42 PM CDT)   Prescription/Discharge Order    lamoTRIgine  :   lamoTRIgine ; Status:   Prescribed ; Ordered As Mnemonic:   LaMICtal 100 mg oral tablet ; Simple Display Line:   200 mg, 2 tab(s), Oral, daily, 270 tab(s), 3 Refill(s) ; Ordering Provider:   Irwin Eckert MD; Catalog Code:   lamoTRIgine ; Order Dt/Tm:   3/13/2020 1:57:43 PM CDT          clonazePAM  :   clonazePAM ; Status:   Prescribed ; Ordered As Mnemonic:   clonazePAM 1 mg oral tablet ; Simple Display Line:   1 mg, 1 tab(s), Oral, bid, 180 tab(s), 1 Refill(s) ; Ordering Provider:   Cris Gomez MD; Catalog Code:   clonazePAM ; Order Dt/Tm:   9/10/2020 11:24:24 AM CDT            Home Meds    QUEtiapine  :   QUEtiapine ; Status:   Documented ; Ordered As Mnemonic:   QUEtiapine 25 mg oral tablet ; Simple Display Line:   25 mg, 1 tab(s), 0 Refill(s) ; Catalog Code:   QUEtiapine ; Order Dt/Tm:   4/6/2021 3:30:37 PM CDT          loratadine-pseudoephedrine  :   loratadine-pseudoephedrine ; Status:   Documented ; Ordered As Mnemonic:   Claritin-D 12 Hour oral tablet, extended release ; Simple Display Line:   1 tab(s), Oral, q12 hrs, PRN: allergy symptoms, 0 Refill(s) ; Catalog Code:   loratadine-pseudoephedrine ; Order Dt/Tm:   1/31/2020 8:30:45 AM CST

## 2022-02-15 NOTE — TELEPHONE ENCOUNTER
---------------------  From: Cris Gomez MD   To: [facility] Message Pool ([id number]_[place]);     Sent: 4/22/2019 10:33:27 AM CDT  Subject: General Message     please call pharmacy to let them know to fill the quetiapine for 90 pills, not the 30 pillsSpoke to pharmacy at 1058. Qutiapine sent for 90 days. Pharmacy needs to know time frame between the 2 a day and 3 a day.Per [facility] verbal she would like patient to take 2 pills for 1 day. Informed patient pharmacy- they verbalized understanding. new Rx sent.
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
## 2022-02-15 NOTE — PROGRESS NOTES
"   Patient:   ELI ECHOLS            MRN: [id number]            FIN: [id number]               Age:   48 years     Sex:  Female     :  1970   Associated Diagnoses:   Nausea   Author:   Bridgett Rendon      Chief Complaint   2019 8:25 AM CST    Constipation.  \"plug\" like BM last night.  Vomiting today.  concern about duloxetine      History of Present Illness   pt on duloxetine 18 after one week increased dose to bid, since doing this felt like she has developed constipation  small BMs all week until yesterday when large plug released, no BM since, no blood in stools, did not need to use stool softening agen  pushing fluids, this morning vomited water, has had mild nausea since, does not know if she vomited her cymbalta dose (emesis one hour after swallowing dose)  no fever, no dysuria  is here today to make sure she is not having a concerning reaction to her cymbalta  has headache but this is not new and no neuro changes with this, hx of migraines      Review of Systems   Constitutional:  Fatigue.    Eye:  Negative, photosensitvity.    Ear/Nose/Mouth/Throat:  Negative.    Cardiovascular:  Negative.    Gastrointestinal:  Negative except as documented in history of present illness.    Genitourinary:  Negative except as documented in history of present illness.    Hematology/Lymphatics:  Negative.    Musculoskeletal:  Negative.    Neurologic:  Alert and oriented X4, Headache, No abnormal balance, No confusion, No numbness, No tingling.    Psychiatric:  Negative except as documented in history of present illness.              Health Status   Allergies:    Allergic Reactions (Selected)  Severity Not Documented  Codeine (No reactions were documented)   Medications:  (Selected)   Prescriptions  Prescribed  DULoxetine 30 mg oral delayed release capsule: = 1 cap(s) ( 30 mg ), Oral, bid, # 60 EA, 1 Refill(s), Type: Maintenance, Pharmacy: [facility]  Imitrex 25 mg oral tablet: See Instructions, " Instructions: 1 tab(s) PO Once  may repeat dose in 2 hours if needed, PRN: for migraine headache, # 9 tab(s), 0 Refill(s), Type: Soft Stop, Pharmacy: [id number], 1 tab(s) PO Once; may repeat dose in 2 hours if needed,PRN:f...   Problem list:    All Problems  Obesity / SNOMED CT [id number] / Probable      Histories   Past Medical History:    Resolved  Pregnancy:  Resolved.      Physical Examination   Vital Signs   1/13/2019 8:25 AM CST Temperature Tympanic 97.0 DegF  LOW    Peripheral Pulse Rate 82 bpm    Systolic Blood Pressure 132 mmHg  HI    Diastolic Blood Pressure 88 mmHg  HI    Mean Arterial Pressure 103 mmHg    Oxygen Saturation 99 %      Measurements from flowsheet : Measurements   1/13/2019 8:25 AM CST    Weight Measured - Standard                193.0 lb     General:  Alert and oriented, No acute distress.    Eye:  Pupils are equal, round and reactive to light, Extraocular movements are intact, Vision unchanged.    HENT:  Normocephalic.    Neck:  Supple.    Respiratory:  Respirations are non-labored.    Cardiovascular:  Regular rhythm, No edema.    Gastrointestinal:  Soft, Non-tender, Non-distended, Normal bowel sounds.    Musculoskeletal:  Normal range of motion, Normal gait.    Integumentary:  Warm, Dry, Pink.    Neurologic:  Alert, Oriented, Normal sensory, Normal motor function, No focal deficits.    Psychiatric:  Cooperative, Appropriate mood & affect, Normal judgment.      4mg zofran ODT in clinic      Impression and Plan   Diagnosis     Nausea (CQV54-FB R11.0).     Plan:  increase of duloxetine may have added to constipation and even nausea but I do not think she should stop dose increase  I would like her to see if she can adjust to higher dose in hopes the medication will help with headaches, myalgia and anxiety  she is not having flushing, no HTN, headaches are not new to her, no evidence of serotonin syndrome  offered BMP to ensure she is not dehydrated but she declined  small amt of  zofran prescribed (side effect can be constipation)   pt has f/u with Dr Gomez 1/14/19, I have encouraged her to keep this.    Orders     Orders (Selected)   Prescriptions  Prescribed  Zofran 4 mg oral tablet: = 1 tab(s) ( 4 mg ), PO, q8 hrs, # 10 tab(s), 0 Refill(s), Type: Maintenance, Pharmacy: [id number], 1 tab(s) Oral q8 hrs.

## 2022-02-15 NOTE — TELEPHONE ENCOUNTER
"---------------------  From: Yisel Flores   To: [facility] Message Pool ([id number]);     Sent: 5/21/2021 10:58:29 AM CDT  Subject: General Message     I need more info about the Ambulatory BP Monitor as I am not familiar with this.  We do not have one and Max doesn't.  I contacted [facility] for their help and they didn't know what I was talking about.  Please advise.---------------------  From: Elsy Doss LPN ([employer] [facility] ([address] [place] [address]))   To: Nannette Sandoval RN; Adeline Mcdonald RN;     Sent: 5/24/2021 8:14:43 AM CDT  Subject: FW: General Message---------------------  From: Nannette Sandoval RN   To: Adeline Mcdonald RN; [facility] Message Pool ([address] [place] [address]);     Sent: 5/26/2021 7:52:31 AM CDT  Subject: RE: General Message     I've called [place] and [facility] clinics - they have transferred me to [facility]. Each time I am told that their Bp monitors are placed by \"imaging\" and I am transferred to leave a message. I have received no calls back from either message that I have left.   I am unsure of anywhere else that places ABPMs.---------------------  From: Sandra Triana MA ([facility] Pool ([id number]))   To: Irwin Eckert MD;     Sent: 5/26/2021 8:05:11 AM CDT  Subject: FW: General Message---------------------  From: Irwin Eckert MD   To: [facility]TG Message Pool ([id number]);     Sent: 5/26/2021 11:59:46 AM CDT  Subject: RE: General Message     She needs to set up appt with procedure nurse to set this up---------------------  From: Kong SANCHEZ, Sandra ([employer] Pool ([address]_[facility]))   To: Tulio Eckert MDory;     Sent: 5/27/2021 8:33:01 AM CDT  Subject: RE: General Message     Spoke w/ procedure nurse and we no longer place ABPM in clinic.  Nurse also stated that she has left messages for [facility] re: software needed to do procedure, may take up to 6 months, if approved, to get set up.  Please " advise re: what else to do.---------------------  From: Irwin Eckert MD   To: [facility] Message Pool ([id number]_[place] [facility] [place]);     Sent: 5/27/2021 1:20:24 PM CDT  Subject: RE: General Message     Advise continued monitoring at home several times a weekPt informed that clinic is unable to place ABPM and that we have been looking around to other facilities that maybe able to assist w/ no luck.  Pt also instructed to check BP at home per LISA recommendation and to call clinic w/ BP readings, determine next step based on readings.

## 2022-02-15 NOTE — NURSING NOTE
Generalized Anxiety Disorder Screening Entered On:  1/15/2019 5:35 PM CST    Performed On:  1/14/2019 5:34 PM CST by Erica Gordillo               Generalized Anxiety Disorder Screening   SANTIAGO Nervous, Anxious On Edge :   More than half the days   SANTIAGO Control Worrying B :   Several days   SANTIAGO Worrying Too Much :   Several days   SANTIAGO Restless :   Not at all   SANTIAGO Easily Annoyed/Irritable :   Several days   SANTIAGO Afraid :   Not at all   SANTIAGO Trouble Relaxing :   Not at all   SANTIAGO Total Screening Score :   5    Erica Gordillo - 1/15/2019 5:34 PM CST

## 2022-02-15 NOTE — LETTER
(Inserted Image. Unable to display)       January 21, 2019      ELI SULLIVANVERSON   [address], [place] [id number]        Dear ELI,     Thank you for selecting [facility] for your healthcare needs. Below you will find the results of your recent test(s) done at our clinic.     Your results are normal!  Please come back for a follow up appointment if you are still having symptoms.       Result Name Current Result Reference Range   YUMIKO IFA  NEGATIVE 1/14/2019 NEGATIVE - NEGATIVE   SSA (Ro)  <1.0 NEG 1/14/2019  - <1.0 NEG   SSB (La)  <1.0 NEG 1/14/2019  - <1.0 NEG   Rheumatoid Factor (IU/mL)  <14 1/14/2019  - <14     Please contact my practice at [phone] if you have any questions or concerns.     Sincerely,        Cris Gomez MD      What do your labs mean?  Below is a glossary of commonly ordered labs:  LDL   Bad Cholesterol   HDL   Good Cholesterol  AST/ALT   Liver Function   Cr/Creatinine   Kidney Function  Microalbumin   Kidney Function  BUN   Kidney Function  PSA   Prostate    TSH   Thyroid Hormone  HgbA1c   Diabetes Test   Hgb (Hemoglobin)   Red Blood Cells

## 2022-02-15 NOTE — NURSING NOTE
Comprehensive Intake Entered On:  5/7/2019 11:05 AM CDT    Performed On:  5/7/2019 11:01 AM CDT by Angelic Greenfield CMA               Summary   Chief Complaint :   Anxiety medication check   Menstrual Status :   Menarcheal   Weight Measured :   200 lb(Converted to: 200 lb 0 oz, 90.72 kg)    Height/Length Estimated :   67.5 in(Converted to: 5 ft 7 in, 171.45 cm)    Systolic Blood Pressure :   118 mmHg   Diastolic Blood Pressure :   82 mmHg (HI)    Mean Arterial Pressure :   94 mmHg   Peripheral Pulse Rate :   89 bpm   BP Site :   Right arm   BP Method :   Manual   Temperature Tympanic :   98.8 DegF(Converted to: 37.1 DegC)    Oxygen Saturation :   99 %   Angelic Greenfield CMA - 5/7/2019 11:01 AM CDT   Health Status   Allergies Verified? :   Yes   Medication History Verified? :   Yes   Medical History Verified? :   Yes   Pre-Visit Planning Status :   Completed   Tobacco Use? :   Never smoker   Angelic Greenfield CMA - 5/7/2019 11:01 AM CDT   Consents   Consent for Immunization Exchange :   Consent Granted   Consent for Immunizations to Providers :   Consent Granted   Angelic Greenfield CMA - 5/7/2019 11:01 AM CDT   Meds / Allergies   (As Of: 5/7/2019 11:05:02 AM CDT)   Allergies (Active)   codeine  Estimated Onset Date:   Unspecified ; Created By:   Generated Domain User for [id number]; Reaction Status:   Active ; Substance:   codeine ; Updated By:   Generated Domain User for [id number]; Source:   Patient ; Reviewed Date:   5/7/2019 11:01 AM CDT        Medication List   (As Of: 5/7/2019 11:05:02 AM CDT)   Prescription/Discharge Order    divalproex sodium  :   divalproex sodium ; Status:   Prescribed ; Ordered As Mnemonic:   Depakote  mg oral tablet, extended release ; Simple Display Line:   250 mg, 1 tab(s), Oral, daily, 30 tab(s), 1 Refill(s) ; Ordering Provider:   Cris Gomez MD; Catalog Code:   divalproex sodium ; Order Dt/Tm:   4/17/2019 11:20:09 AM          QUEtiapine  :   QUEtiapine ; Status:   Prescribed ; Ordered As  Mnemonic:   QUEtiapine 50 mg oral tablet, extended release ; Simple Display Line:   See Instructions, 1 tab(s) Oral qpm x 1 day, then 2 po Qpm x 1 day, then 3 po qhs, 90 EA, 1 Refill(s) ; Ordering Provider:   Cris Gomez MD; Catalog Code:   QUEtiapine ; Order Dt/Tm:   4/22/2019 12:22:27 PM            Home Meds    clonazePAM  :   clonazePAM ; Status:   Documented ; Ordered As Mnemonic:   clonazePAM 1 mg oral tablet ; Simple Display Line:   1 mg, 1 tab(s), Oral, bid, 0 Refill(s) ; Catalog Code:   clonazePAM ; Order Dt/Tm:   4/17/2019 10:32:48 AM

## 2022-02-15 NOTE — NURSING NOTE
"Comprehensive Intake Entered On:  1/13/2019 8:28 AM CST    Performed On:  1/13/2019 8:25 AM CST by Marisol Powell               Summary   Chief Complaint :   Constipation.  \"plug\" like BM last night.  Vomiting today.  concern about duloxetine   Weight Measured :   193.0 lb(Converted to: 193 lb 0 oz, 87.54 kg)    Systolic Blood Pressure :   132 mmHg (HI)    Diastolic Blood Pressure :   88 mmHg (HI)    Mean Arterial Pressure :   103 mmHg   Peripheral Pulse Rate :   82 bpm   Temperature Tympanic :   97.0 DegF(Converted to: 36.1 DegC)  (LOW)    Oxygen Saturation :   99 %   Marisol Powell - 1/13/2019 8:25 AM CST   Health Status   Allergies Verified? :   Yes   Medication History Verified? :   Yes   Tobacco Use? :   Never smoker   Marisol Powell - 1/13/2019 8:25 AM CST   Meds / Allergies   (As Of: 1/13/2019 8:28:20 AM CST)   Allergies (Active)   codeine  Estimated Onset Date:   Unspecified ; Created By:   [employer] Domain User for [id number]; Reaction Status:   Active ; Substance:   codeine ; Updated By:   Generated Domain User for [id number]; Source:   Patient ; Reviewed Date:   12/16/2018 1:09 PM CST        Medication List   (As Of: 1/13/2019 8:28:20 AM CST)   Prescription/Discharge Order    DULoxetine  :   DULoxetine ; Status:   Prescribed ; Ordered As Mnemonic:   DULoxetine 30 mg oral delayed release capsule ; Simple Display Line:   30 mg, 1 cap(s), Oral, bid, 60 EA, 1 Refill(s) ; Ordering Provider:   Bridgett Rendon; Catalog Code:   DULoxetine ; Order Dt/Tm:   12/24/2018 11:02:46 AM          SUMAtriptan  :   SUMAtriptan ; Status:   Prescribed ; Ordered As Mnemonic:   Imitrex 25 mg oral tablet ; Simple Display Line:   See Instructions, 1 tab(s) PO Once  may repeat dose in 2 hours if needed, PRN: for migraine headache, 9 tab(s), 0 Refill(s) ; Ordering Provider:   Bridgett Rendon; Catalog Code:   SUMAtriptan ; Order Dt/Tm:   12/16/2018 1:10:01 PM  "

## 2022-02-15 NOTE — PROGRESS NOTES
"   Patient:   ELI ECHOLS            MRN: [id number]            FIN: [id number]               Age:   48 years     Sex:  Female     :  1970   Associated Diagnoses:   Migraine   Author:   Bridgett Rendon      Chief Complaint   2018 12:48 PM CST  Here for Migraine that started on Thursday night and has been worsening, is only taking Ibuprofen and it is not helping at all. has never been on any migraine meds      Interval History   PPC for evaluation of migraine.  Pt reports having migraine pain for years.  HAs typically infrequent occurring a couple times a year until her 40s when they started to increase,  she is uncertain if this correlates to irregular menses which she also has, last one through  and migraine started  at night.  denies risk of pg because  had vasectomy  however, pt has strabismus and has had surgery for this (almost 30 yrs ago) has been following with Dr Alegria and eye muscles have weakened over past few years making night time driving difficulty, she is wondering if her eye strain could also be adding to headaches  when asked how often migraines occur it is difficult to answer.  \"I think I can have several every week but I really think it is one that does not go away and symptoms last for several days.\"  onset is usually pain along right side of nasal bridge and runny nose  today she has mild nausea      Review of Systems   Constitutional:  Fatigue.    Eye:  Negative except as documented in history of present illness, photosensitvity.    Ear/Nose/Mouth/Throat:  Negative.    Cardiovascular:  Negative.    Gastrointestinal:  Nausea.    Hematology/Lymphatics:  Negative.    Musculoskeletal:  Negative.    Neurologic:  Alert and oriented X4, Headache, No abnormal balance, No confusion, No numbness, No tingling.    Psychiatric             Health Status   Allergies:    Allergic Reactions (Selected)  Severity Not Documented  Codeine (No reactions were documented)  No " known allergies (No reactions were documented)   Medications:  (Selected)   Documented Medications  Documented  ibuprofen 200 mg oral tablet: = 1 tab(s) ( 200 mg ), Oral, q6 hrs, 0 Refill(s), Type: Maintenance      Histories   Past Medical History:    Resolved  Pregnancy:  Resolved.   Social History:             No active social history items have been recorded.      Physical Examination   Vital Signs   12/16/2018 12:48 PM CST Temperature Tympanic 98.1 DegF    Peripheral Pulse Rate 72 bpm    Pulse Site Radial artery    Systolic Blood Pressure 126 mmHg    Diastolic Blood Pressure 84 mmHg  HI    Mean Arterial Pressure 98 mmHg    BP Site Right arm    Oxygen Saturation 99 %      General:  Alert and oriented, No acute distress.    Eye:  Pupils are equal, round and reactive to light, mild medial inversion of both eyes.    HENT:  Normocephalic.    Respiratory:  Respirations are non-labored.    Cardiovascular:  Normal rate, Regular rhythm, No edema.    Musculoskeletal:  Normal range of motion, Normal gait.    Integumentary:  Warm, Dry, Pink.    Neurologic:  Alert, Oriented, Normal sensory, Normal motor function, No focal deficits, Cranial Nerves II-XII are grossly intact, Normal deep tendon reflexes.    Psychiatric:  Cooperative, Appropriate mood & affect, Normal judgment.        60mg toradol IM  and 4mg zofran ODT for nausea  montiored for 25 mins, headache almost resolved      Impression and Plan   Diagnosis     Migraine ([id number] G43.909).     Patient Instructions:       Counseled: Patient, Regarding diagnosis, Regarding medications, Activity.    Summary:  tomorrow will start with medrol dose tony and take as directed, take with food, discussed poential side effect to medrol dose tony  going forward have written presciption for imitrex for abortive therapy and discussed how to use this  if not effective please RTC and we can discuss next option, wouldlike her to keep headache diary, may need amitriptyline or  propranolol  considered tizanidine but I am not sure if would be beneficial if there is concerns with occular muscle group and pt should discuss possibility with Dr Alegria if he feels worsening strabismus could be adding to headache potential  if headaches are daily valproic acid has been beneficial  neurology eval can also be done.

## 2022-02-15 NOTE — TELEPHONE ENCOUNTER
---------------------  From: Starr Herman CMA (Phone Messages Pool ([id number]_[facility]))   To: [employer] Message Pool ([id number]_[place]);     Sent: 5/27/2021 3:54:18 PM CDT  Subject: FW: ABPM           ---------------------  From: ELI CARVALHO  To: [facility]  Sent: 05/27/2021 03:46 p.m. CDT  Subject: ABPM  Thank you for calling me with an update on the unavailability to obtain an ABPM at this time.    I can understand how the transition of the clinic to a new building will take time!    I will continue with my home monitoring until such time that the clinic is able to set up an ABPM.  As per the doctor regarding the setup of an ABPM, please update me with any information.    Sincerely,  Eli Carvalho---------------------  From: Sandra Triana MA ([employer] Message Pool ([id number]_[facility]))   To: Irwin Eckert MD;     Sent: 5/27/2021 4:08:05 PM CDT  Subject: FW: ABPM
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
## 2022-02-15 NOTE — LETTER
(Inserted Image. Unable to display)       May 10, 2019      ELI ECHOLS   [address] [place], [place] [id number]        Dear ELI,     Thank you for selecting [facility] for your healthcare needs. Below you will find the results of your recent test(s) done at our clinic.      Your results are normal!  Please come back for a follow up appointment if you are still having symptoms.       Result Name Current Result Previous Result Reference Range   Sodium Level (mmol/L)  138 5/7/2019  137 3/4/2019 135 - 146   Potassium Level (mmol/L)  4.2 5/7/2019  4.0 3/4/2019 3.5 - 5.3   Chloride Level (mmol/L)  106 5/7/2019  103 3/4/2019 98 - 110   CO2 Level (mmol/L)  23 5/7/2019  23 3/4/2019 20 - 32   Glucose Level (mg/dL)  76 5/7/2019  85 3/4/2019 65 - 99   BUN (mg/dL)  16 5/7/2019  18 3/4/2019 7 - 25   Creatinine Level (mg/dL)  0.80 5/7/2019  0.84 3/4/2019 0.50 - 1.10   BUN/Creat Ratio  NOT APPLICABLE 5/7/2019  NOT APPLICABLE 3/4/2019 6 - 22   eGFR (mL/min/1.73m2)  88 5/7/2019  83 3/4/2019 > OR = 60 -    eGFR  (mL/min/1.73m2)  102 5/7/2019  96 3/4/2019 > OR = 60 -    Calcium Level (mg/dL)  8.9 5/7/2019  9.4 3/4/2019 8.6 - 10.2   Bilirubin Total (mg/dL)  0.4 5/7/2019  0.2 - 1.2   Alkaline Phosphatase (unit/L)  81 5/7/2019  33 - 115   AST/SGOT (unit/L)  17 5/7/2019  10 - 35   ALT/SGPT (unit/L)  15 5/7/2019  6 - 29   Protein Total (gm/dL)  6.5 5/7/2019  6.1 - 8.1   Albumin Level (gm/dL)  3.8 5/7/2019  3.6 - 5.1   Globulin  2.7 5/7/2019  1.9 - 3.7   A/G Ratio  1.4 5/7/2019  1.0 - 2.5   T4 Free (ng/dL)  0.8 5/7/2019  0.8 - 1.8   T3 Free (pg/mL)  2.9 5/7/2019  2.3 - 4.2   TSH (mIU/L)  2.54 5/7/2019  1.39 3/4/2019      Please contact my practice at [phone] if you have any questions or concerns.     Sincerely,        Cris Gomez MD      What do your labs mean?  Below is a glossary of commonly ordered labs:  LDL   Bad Cholesterol   HDL   Good Cholesterol  AST/ALT   Liver Function    Cr/Creatinine   Kidney Function  Microalbumin   Kidney Function  BUN   Kidney Function  PSA   Prostate    TSH   Thyroid Hormone  HgbA1c   Diabetes Test   Hgb (Hemoglobin)   Red Blood Cells

## 2022-02-15 NOTE — NURSING NOTE
Generalized Anxiety Disorder Screening Entered On:  9/11/2020 8:21 AM CDT    Performed On:  9/10/2020 8:20 AM CDT by Gia Hunt CMA               Generalized Anxiety Disorder Screening   SANTIAGO Nervous, Anxious On Edge :   Several days   SANTIAGO Control Worrying B :   Several days   SANTIAGO Worrying Too Much :   More than half the days   SANTIAGO Trouble Relaxing :   Several days   SANTIAGO Restless :   Several days   SANTIAGO Easily Annoyed/Irritable :   Several days   SANTIAGO Afraid :   Not at all   SANTIAGO Total Screening Score :   7    SANTIAGO Difficulty with Work, Home, Others :   Somewhat difficult   Gia Hunt CMA - 9/11/2020 8:20 AM CDT

## 2022-02-15 NOTE — PROGRESS NOTES
Chief Complaint    f/u labs.  History of Present Illness       Patient has had no further neurologic symptoms or visual symptoms.  She was incidentally found to have iron deficiency anemia.  Her periods are regular.  They last 6 to 7 days and can be heavy at times.  Cycle is 26 days.  No heartburn, dysphagia, odynophagia, nausea, vomiting abdominal pain, melena.  She has occasional red blood on the tissue.  Review of Systems       No fever, chills, headache, myalgia edema.  Physical Exam   Vitals & Measurements    T: 99.1  F (Tympanic)  HR: 86 (Peripheral)  BP: 133/83     HT: 67 in  HT: 67.5 in  WT: 198.0 lb  BMI: 31.01        Patient is healthy-appearing woman in no distress.  Alert and oriented.  Speech seems a little pressured.  Eyes appear normal with intact extraocular muscles.  Temporal arteries nontender.  Neck is supple without adenopathy or thyromegaly.  Carotid pulsations normal.  Chest clear.  Cardiac exam regular rate without murmur.  No edema.  Neurologic exam speech is fluent and memory is intact.  Alert and oriented.  Cranial nerves normal.  Strength and gait normal.  Assessment/Plan       Bipolar 1 disorder (F31.0)         Managed by psychiatry.                Iron deficiency anemia (D50.9)         Periods somewhat heavy. She is 51 next month.  Patient is on iron and will repeat the lab work and weeks.  Referral for colonoscopy.         Ordered:          54496 office o/p est mod 30-39 min (Charge), Quantity: 1, Iron deficiency anemia  Spell of visual loss  Chronic tension headache          Return to Clinic (Request), RFV: hgb, retic, ferritin, Return in 6-8 weeks                Spell of visual loss (H53.129)          MRI and sed rate normal.  No ongoing symptoms.  Neurology referral.         Ordered:          28717 office o/p est mod 30-39 min (Charge), Quantity: 1, Iron deficiency anemia  Spell of visual loss  Chronic tension headache          Referral (Request), 07/05/21 14:51:00 CDT, Referred  to: Neurology, Referred to: Leeanna, Reason for referral: visual spell, Additional instructions: forward lab and MRI, Spell of visual loss           Patient Information     Name:ELI ECHOLS      Address:      [address] [id number]     Sex:Female     YOB: 1970     [phone]     Emergency Contact:IFTIKHAR ECHOLS     MRN:[id number]     FIN:[id number]     Location:[facility]     Date of Service:2021      Primary Care Physician:       Cris Gomez MD, [phone]      Attending Physician:       Дмитрий Umaña MD, [phone]  Problem List/Past Medical History    Ongoing     Bipolar 1 disorder     Chronic tension headache     SANTIAGO (generalized anxiety disorder)     History of eye surgery     Hypomania     Iron deficiency anemia     Migraine headache     Obesity     Oscillopsia    Historical     Inpatient stay       Comments: @[facility], [place] - Hypomania - Bipolar 2 versus cyclothymia     Pregnancy     Pregnancy     Pregnancy     Pregnancy     Pregnancy     Pregnancy  Procedure/Surgical History      delivery NOS, unspecified (2011)     Other Postsurgical Status (2011)      section     Tonsillectomy  Medications    Claritin-D 12 Hour oral tablet, extended release, 1 tab(s), Oral, q12 hrs, PRN    clonazePAM 1 mg oral tablet, 1 mg= 1 tab(s), Oral, bid, 1 refills    ferrous sulfate 325 mg (65 mg elemental iron) oral delayed release tablet, 325 mg= 1 tab(s), Oral, daily    LaMICtal 100 mg oral tablet, 200 mg= 2 tab(s), Oral, daily, 3 refills    QUEtiapine 25 mg oral tablet, 25 mg= 1 tab(s)  Allergies    Depakote (Diarrhea)    codeine  Social History    Smoking Status     Never smoker     Alcohol      Never     Electronic Cigarette/Vaping      Electronic Cigarette Use: Never.     Employment/School      Highest education level: University degree(s).     Exercise      Exercise frequency: Daily. Exercise type: Walking  dogs, elliptical.     Home/Environment      Marital status: . Living situation: Home/Independent. Injuries/Abuse/Neglect in household: No.     Nutrition/Health      Type of diet: Regular.     Sexual      Identifies as female, Sexual orientation: Straight or heterosexual. History of STD: No. Uses condoms: No. Contraceptive Use Details: Birth control pill. History of sexual abuse: No.     Substance Abuse      Never     Tobacco      Never (less than 100 in lifetime)  Family History    Cancer of adrenal gland: Father.    Heart disease: Father.    Hypercholesterolemia: Father.    Lung disorder: Father.  Lab Results          Lab Results (Last 4 results within 90 days)          Sodium Level: 137 mmol/L [135 mmol/L - 146 mmol/L] (06/25/21 16:07:00)          Potassium Level: 4.2 mmol/L [3.5 mmol/L - 5.3 mmol/L] (06/25/21 16:07:00)          Chloride Level: 107 mmol/L [98 mmol/L - 110 mmol/L] (06/25/21 16:07:00)          CO2 Level: 23 mmol/L [20 mmol/L - 32 mmol/L] (06/25/21 16:07:00)          Glucose Level: 84 mg/dL [65 mg/dL - 99 mg/dL] (06/25/21 16:07:00)          BUN: 14 mg/dL [7 mg/dL - 25 mg/dL] (06/25/21 16:07:00)          Creatinine Level: 0.85 mg/dL [0.5 mg/dL - 1.1 mg/dL] (06/25/21 16:07:00)          BUN/Creat Ratio: NOT APPLICABLE [6  - 22] (06/25/21 16:07:00)          eGFR: 80 mL/min/1.73m2 (06/25/21 16:07:00)          eGFR : 93 mL/min/1.73m2 (06/25/21 16:07:00)          Calcium Level: 9.2 mg/dL [8.6 mg/dL - 10.2 mg/dL] (06/25/21 16:07:00)          Ferritin: 4 ng/mL Low [16 ng/mL - 232 ng/mL] (06/25/21 16:07:00)          WBC: 7 [3.8  - 10.8] (06/25/21 16:07:00)          RBC: 3.74 Low [3.8  - 5.1] (06/25/21 16:07:00)          Hgb: 9.5 gm/dL Low [11.7 gm/dL - 15.5 gm/dL] (06/25/21 16:07:00)          Hct: 30.4 % Low [35 % - 45 %] (06/25/21 16:07:00)          MCV: 81.3 fL [80 fL - 100 fL] (06/25/21 16:07:00)          MCH: 25.4 pg Low [27 pg - 33 pg] (06/25/21 16:07:00)          MCHC: 31.3 gm/dL  Low [32 gm/dL - 36 gm/dL] (06/25/21 16:07:00)          RDW: 13.5 % [11 % - 15 %] (06/25/21 16:07:00)          Platelet: 339 [140  - 400] (06/25/21 16:07:00)          MPV: 10.2 fL [7.5 fL - 12.5 fL] (06/25/21 16:07:00)          Sed Rate: 6 (06/25/21 16:07:00)          Test in Question - Test(s) Ordered: FERRITIN (06/25/21 16:07:00)          Misc Test CPT Code: 457SB (06/25/21 16:07:00)          Misc Test Client Contact: JOSE ANGEL CEJAON (06/25/21 16:07:00)          Misc Test Comment: See comment (06/25/21 16:07:00)          Misc Test Comment: See comment (06/25/21 16:07:00)  Immunizations          Scheduled Immunizations          Dose Date(s)          influenza virus vaccine, inactivated          09/21/2020          influenza, H1N1, live          12/09/2009          MMR (measles/mumps/rubella)          06/22/2007          Td          04/12/2001          Other Immunizations          Hep B          06/22/2007, 08/22/2007          influenza          01/13/2018          MMR (measles/mumps/rubella)          08/22/2007          influenza virus vaccine, inactivated          10/28/2019          Td          06/13/2015          meningococcal conjugate vaccine          08/22/2007          SARS-CoV-2 (COVID-19) Moderna-1273          04/29/2021, 05/27/2021

## 2022-02-15 NOTE — PROGRESS NOTES
Chief Complaint    c/o migraine xSaturday night.   also c/o low back pain since PT Sunday.  History of Present Illness       Here with migraine that started on Saturday with a visual aura.  Pain is on the right as usual.  She rates it an 8/10.  No new neurological symptoms.        Low back pain started last night with no radiation.  Movement and certain positions aggravate the pain.  Review of Systems          ROS reviewed and negative except for symptoms noted in HPI.6  Physical Exam   Vitals & Measurements    T: 97  F (Tympanic)  HR: 81 (Peripheral)  BP: 144/90     HT: 67 in  HT: 67.5 in  WT: 202.5 lb  BMI: 31.71           General:  Alert and oriented, No acute distress.            Eye:  Normal conjunctiva, Vision unchanged.            HENT:  Normocephalic, Tympanic membranes are clear          Neck:  Supple, Non-tender          Respiratory:  Lungs are clear to auscultation, Respirations are non-labored.            Cardiovascular:  Normal rate, Regular rhythm, Normal peripheral perfusion.            Musculoskeletal:  Normal strength.  Lumbar tenderness, decreased range of motion lumbar spine          Integumentary:  Warm, Dry, No rash.            Neurologic:  Alert, Oriented.  Cranial nerves II through XII intact, coordination Station and gait intact, normal deep tendon reflexes          Psychiatric:  Cooperative, Appropriate mood & affect.    Assessment/Plan       1. Migraine headache (G43.909)          ketorolac 60 g IM today and follow-up if not improving.  Consider occipital nerve block on the right if no improvement with ketorolac         Ordered:          ketorolac, 60 mg, im, once, (Completed)          01850 therapeutic prophylactic/dx injection subq/im (Charge), Quantity: 1, Lumbar strain  Migraine headache           ketorolac tromethamine inj, 15 mg (Charge), Quantity: 4, Lumbar strain  Migraine headache                2. Lumbar strain (S39.012A)         Lumbar strain unclear etiology, rest, ice,  follow-up if not improving she should have some relief with ketorolac         Ordered:          ketorolac, 60 mg, im, once, (Completed)          56257 therapeutic prophylactic/dx injection subq/im (Charge), Quantity: 1, Lumbar strain  Migraine headache           ketorolac tromethamine inj, 15 mg (Charge), Quantity: 4, Lumbar strain  Migraine headache           Patient Information     Name:ELI ECHOLS      Address:      [address] [id number]     Sex:Female     YOB: 1970     [phone]     Emergency Contact:IFTIKHAR ECHOLS     MRN:[id number]     FIN:[id number]     Location:[facility]     Date of Service:2021      Primary Care Physician:       Cris Gomez MD, [phone]      Attending Physician:       Irwin Eckert MD, [phone]  Problem List/Past Medical History    Ongoing     Bipolar 1 disorder     Cervical myofascial pain syndrome     Chronic tension headache     SANTIAGO (generalized anxiety disorder)     History of eye surgery     Hypomania     Iron deficiency anemia     Migraine headache     Obesity     Oscillopsia    Historical     Inpatient stay       Comments: @[facility], [place] - Hypomania - Bipolar 2 versus cyclothymia     Pregnancy     Pregnancy     Pregnancy     Pregnancy     Pregnancy     Pregnancy  Procedure/Surgical History      delivery NOS, unspecified (2011)     Other Postsurgical Status (2011)      section     Tonsillectomy  Medications    Claritin-D 12 Hour oral tablet, extended release, 1 tab(s), Oral, q12 hrs, PRN    clonazePAM 1 mg oral tablet, 1 mg= 1 tab(s), Oral, bid, 1 refills    ferrous sulfate 325 mg (65 mg elemental iron) oral delayed release tablet, 325 mg= 1 tab(s), Oral, daily    LaMICtal 100 mg oral tablet, 200 mg= 2 tab(s), Oral, daily, 3 refills    QUEtiapine 25 mg oral tablet, 25 mg= 1 tab(s)  Allergies    Depakote (Diarrhea)    codeine  Social History     Smoking Status     Never smoker     Alcohol      Never     Electronic Cigarette/Vaping      Electronic Cigarette Use: Never.     Employment/School      Highest education level: University degree(s).     Exercise      Exercise frequency: Daily. Exercise type: Walking dogs, elliptical.     Home/Environment      Marital status: . Living situation: Home/Independent. Injuries/Abuse/Neglect in household: No.     Nutrition/Health      Type of diet: Regular.     Sexual      Identifies as female, Sexual orientation: Straight or heterosexual. History of STD: No. Uses condoms: No. Contraceptive Use Details: Birth control pill. History of sexual abuse: No.     Substance Abuse      Never     Tobacco      Never (less than 100 in lifetime)  Family History    Cancer of adrenal gland: Father.    Heart disease: Father.    Hypercholesterolemia: Father.    Lung disorder: Father.  Immunizations          Scheduled Immunizations          Dose Date(s)          influenza virus vaccine, inactivated          09/21/2020, 09/16/2021          influenza, H1N1, live          12/09/2009          MMR (measles/mumps/rubella)          06/22/2007          Td          04/12/2001          Other Immunizations          Hep B          06/22/2007, 08/22/2007          influenza          01/13/2018          MMR (measles/mumps/rubella)          08/22/2007          influenza virus vaccine, inactivated          10/28/2019          Td          06/13/2015          meningococcal conjugate vaccine          08/22/2007          SARS-CoV-2 (COVID-19) Moderna-1273          04/29/2021, 05/27/2021
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
## 2022-02-15 NOTE — TELEPHONE ENCOUNTER
---------------------  From: Irwin Eckert MD   To: ELI ECHOLS    Sent: 4/19/2021 5:25:43 PM CDT  Subject: Patient Message - Results Notification        Your recent CT of the head was normal.

## 2022-02-15 NOTE — LETTER
(Inserted Image. Unable to display)       [address] [place] [address]  July 01, 2021      ELI [place] [address]        Dear ELI,    Thank you for selecting [facility] for your healthcare needs.  Below you will find the results of your recent tests done at our clinic.     Low ferritin confirms iron deficiency anemia. Please start over the counter iron-Ferrous Sulfate 325 mg daily with food. Please schedule an appointment.      Result Name Current Result Reference Range   Ferritin (ng/mL) ((L)) 4 6/25/2021 16 - 232       Please contact me or my assistant at [phone] if you have any questions.     Sincerely,        Дмитрий Umaña MD

## 2022-02-15 NOTE — PROGRESS NOTES
Chief Complaint    f/u, discuss BP--has been elevated since accident.  was low over the weekend and elevated a few days later.  History of Present Illness       Here with concerns about labile blood pressure numbers.  Range from 175-80/30-45.  She did not feel well at the lower end of the range.  BP is higher with activity.       Progressing with PT, neck pain has improved  Review of Systems          ROS reviewed and negative except for symptoms noted in HPI.  Physical Exam   Vitals & Measurements    T: 98.2  F (Tympanic)  HR: 81 (Peripheral)  BP: 115/78  SpO2: 100%     HT: 67 in  HT: 67.5 in  WT: 199.1 lb  BMI: 31.18             General:  Alert and oriented, No acute distress.             Eye: Normal conjunctiva.             HENT:  Oral mucosa is moist.             Neck:  Supple.             Respiratory:  Respirations are non-labored.             Cardiovascular:  Normal rate           Musculoskeletal:  Normal gait.             Psychiatric:  Cooperative, Appropriate mood & affect, Normal judgment.  Assessment/Plan       1. Labile blood pressure (R09.89)          set up for ABPM         follow up when results are available  Patient Information     Name:ELI ECHOLS      Address:      [address] [id number]     Sex:Female     YOB: 1970     [phone]     Emergency Contact:IFTIKHAR ECHOLS     MRN:[id number]     FIN:[id number]     Location:[facility]     Date of Service:05/19/2021      Primary Care Physician:       Cris Gomez MD, [phone]      Attending Physician:       Irwin Eckert MD, [phone]  Problem List/Past Medical History    Ongoing     Bipolar 1 disorder     Chronic tension headache     SANTIAGO (generalized anxiety disorder)     History of eye surgery     Hypomania     Migraine headache     Obesity     Oscillopsia    Historical     Inpatient stay       Comments: @[place], [place] - Hypomania - Bipolar 2 versus cyclothymia      Pregnancy     Pregnancy     Pregnancy     Pregnancy     Pregnancy     Pregnancy  Procedure/Surgical History      delivery NOS, unspecified (2011)     Other Postsurgical Status (2011)      section     Tonsillectomy  Medications    Claritin-D 12 Hour oral tablet, extended release, 1 tab(s), Oral, q12 hrs, PRN    clonazePAM 1 mg oral tablet, 1 mg= 1 tab(s), Oral, bid, 1 refills    LaMICtal 100 mg oral tablet, 200 mg= 2 tab(s), Oral, daily, 3 refills    QUEtiapine 25 mg oral tablet, 25 mg= 1 tab(s)  Allergies    Depakote (Diarrhea)    codeine  Social History    Smoking Status     Never smoker     Alcohol      Never     Electronic Cigarette/Vaping      Electronic Cigarette Use: Never.     Employment/School      Highest education level: University degree(s).     Exercise      Exercise frequency: Daily. Exercise type: Walking dogs, elliptical.     Home/Environment      Marital status: . Living situation: Home/Independent. Injuries/Abuse/Neglect in household: No.     Nutrition/Health      Type of diet: Regular.     Sexual      Identifies as female, Sexual orientation: Straight or heterosexual. History of STD: No. Uses condoms: No. Contraceptive Use Details: Birth control pill. History of sexual abuse: No.     Substance Abuse      Never     Tobacco      Never (less than 100 in lifetime)  Family History    Cancer of adrenal gland: Father.    Heart disease: Father.    Hypercholesterolemia: Father.    Lung disorder: Father.  Immunizations      Vaccine Date Status          SARS-CoV-2 (COVID-19) Moderna-1273 2021 Given          influenza virus vaccine, inactivated 2020 Recorded          influenza virus vaccine, inactivated 10/28/2019 Given          influenza 2018 Recorded          Td 2015 Recorded          influenza, H1N1, live 2009 Recorded          MMR (measles/mumps/rubella) 2007 Recorded          meningococcal conjugate vaccine 2007 Recorded           Hep B 08/22/2007 Recorded          Hep B 06/22/2007 Recorded          MMR (measles/mumps/rubella) 06/22/2007 Recorded          Td 04/12/2001 Recorded

## 2022-02-15 NOTE — LETTER
(Inserted Image. Unable to display)   April 30, 2019      ELI SULLIVANVERSON   [address] [place], [place] [id number]        Dear ELI,      Thank you for selecting [facility] (previously [place] [facility]) for your healthcare needs.      Our records indicate you are due for the following services:        Follow-up office visit.      To schedule an appointment or if you have further questions, please contact your primary clinic:   [facility]       [phone]   [facility]       [phone]              [facility]     [phone]      Powered by [employer]    Sincerely,    Cris Gomez MD

## 2022-02-15 NOTE — TELEPHONE ENCOUNTER
---------------------  From: Adeline Mcdonald RN ([employer] [facility]ages Pool ([address]))   To: [employer] [facility] ([address] [place] [address]);     Sent: 4/20/2021 9:00:38 AM CDT  Subject: Consumer message: Results notification           ---------------------  From: ELI CARVALHO  To: [facility]  Sent: 04/20/2021 08:46 a.m. CDT  Subject: RE: Results notification  I scheduled an appointment for tomorrow morning. Please let me know if I should come in to discuss any further treatment and/or physical therapy needed.    Thank you  Eli Carvalho---------------------  From: Sandra Triana MA ([employer] [facility] ([address]))   To: Irwin Eckert MD;     Sent: 4/20/2021 9:16:50 AM CDT  Subject: FW: Consumer message: Results notification     Please advise.  Pt coming in to discuss CT results 4/21/2021.---------------------  From: Irwin Eckert MD   To: [employer] [facility] ([id number]);     Sent: 4/20/2021 10:28:28 AM CDT  Subject: RE: Consumer message: Results notification     Will discuss results and further management at visit---------------------  From: Sandra Triana MA ([employer] [facility] [employer] ([address]))   To: ELI CARVALHO    Sent: 4/20/2021 10:47:32 AM CDT  Subject: FW: Consumer message: Results notification     Hi Dr. Babar Yao received your message and will further discuss results and treatment tomorrow at your appointment.    Sincerely,     Sandra PALACIOS CMA

## 2022-02-15 NOTE — NURSING NOTE
Comprehensive Intake Entered On:  10/4/2021 8:39 AM CDT    Performed On:  10/4/2021 8:33 AM CDT by Keisha Bergeron               Summary   Chief Complaint :   Medical FU. FU labs. BP and HR concerns.    Menstrual Status :   Menarcheal   Weight Measured :   199.4 lb(Converted to: 199 lb 6 oz, 90.446 kg)    Height Measured :   67 in(Converted to: 5 ft 7 in, 170.18 cm)    Body Mass Index :   31.23 kg/m2 (HI)    Body Surface Area :   2.07 m2   Height/Length Estimated :   67.5 in(Converted to: 5 ft 7 in, 171.45 cm)    Systolic Blood Pressure :   124 mmHg   Diastolic Blood Pressure :   82 mmHg (HI)    Mean Arterial Pressure :   96 mmHg   Peripheral Pulse Rate :   80 bpm   BP Site :   Right arm   BP Method :   Manual   HR Method :   Electronic   Temperature Tympanic :   98.2 DegF(Converted to: 36.8 DegC)    Oxygen Saturation :   99 %   Keisha Bergeron - 10/4/2021 8:33 AM CDT   Health Status   Allergies Verified? :   Yes   Medication History Verified? :   Yes   Medical History Verified? :   Yes   Pre-Visit Planning Status :   Completed   Tobacco Use? :   Never smoker   Keisha Bergeron - 10/4/2021 8:33 AM CDT   Consents   Consent for Immunization Exchange :   Consent Granted   Consent for Immunizations to Providers :   Consent Granted   Keisha Bergeron - 10/4/2021 8:33 AM CDT   Meds / Allergies   (As Of: 10/4/2021 8:39:25 AM CDT)   Allergies (Active)   codeine  Estimated Onset Date:   Unspecified ; Created By:   Generated Domain User for [id number]; Reaction Status:   Active ; Substance:   codeine ; Updated By:   Generated Domain User for [id number]; Source:   Patient ; Reviewed Date:   10/4/2021 8:37 AM CDT      Depakote  Estimated Onset Date:   Unspecified ; Reactions:   Diarrhea ; Comments:     Comment 1: developed severe diarrhea at high doses   ; Created By:   Cris Gomez MD; Reaction Status:   Active ; Category:   Drug ; Substance:   Depakote ; Type:   Secondary Effect ; Updated By:   Cris Gomez MD;  Reviewed Date:   10/4/2021 8:37 AM CDT        Medication List   (As Of: 10/4/2021 8:39:25 AM CDT)   Prescription/Discharge Order    clonazePAM  :   clonazePAM ; Status:   Prescribed ; Ordered As Mnemonic:   clonazePAM 1 mg oral tablet ; Simple Display Line:   1 mg, 1 tab(s), Oral, bid, 180 tab(s), 1 Refill(s) ; Ordering Provider:   Cris Gomez MD; Catalog Code:   clonazePAM ; Order Dt/Tm:   9/10/2020 11:24:24 AM CDT          lamoTRIgine  :   lamoTRIgine ; Status:   Prescribed ; Ordered As Mnemonic:   LaMICtal 100 mg oral tablet ; Simple Display Line:   200 mg, 2 tab(s), Oral, daily, 270 tab(s), 3 Refill(s) ; Ordering Provider:   Irwin Eckert MD; Catalog Code:   lamoTRIgine ; Order Dt/Tm:   3/13/2020 1:57:43 PM CDT            Home Meds    ferrous sulfate  :   ferrous sulfate ; Status:   Documented ; Ordered As Mnemonic:   ferrous sulfate 325 mg (65 mg elemental iron) oral delayed release tablet ; Simple Display Line:   325 mg, 1 tab(s), Oral, daily, 0 Refill(s) ; Catalog Code:   ferrous sulfate ; Order Dt/Tm:   7/5/2021 2:55:34 PM CDT          loratadine-pseudoephedrine  :   loratadine-pseudoephedrine ; Status:   Documented ; Ordered As Mnemonic:   Claritin-D 12 Hour oral tablet, extended release ; Simple Display Line:   1 tab(s), Oral, q12 hrs, PRN: allergy symptoms, 0 Refill(s) ; Catalog Code:   loratadine-pseudoephedrine ; Order Dt/Tm:   1/31/2020 8:30:45 AM CST          QUEtiapine  :   QUEtiapine ; Status:   Documented ; Ordered As Mnemonic:   QUEtiapine 25 mg oral tablet ; Simple Display Line:   25 mg, 1 tab(s), 0 Refill(s) ; Catalog Code:   QUEtiapine ; Order Dt/Tm:   4/6/2021 3:30:37 PM CDT

## 2022-02-15 NOTE — NURSING NOTE
Comprehensive Intake Entered On:  1/14/2019 10:49 AM CST    Performed On:  1/14/2019 10:45 AM CST by Dinorah Torres CMA               Summary   Chief Complaint :   f/u migraine, co-insiding with third day of period   Weight Measured :   191.6 lb(Converted to: 191 lb 10 oz, 86.91 kg)    Systolic Blood Pressure :   122 mmHg   Diastolic Blood Pressure :   80 mmHg   Mean Arterial Pressure :   94 mmHg   Peripheral Pulse Rate :   90 bpm   BP Site :   Right arm   BP Method :   Manual   HR Method :   Electronic   Temperature Tympanic :   98.6 DegF(Converted to: 37.0 DegC)    Oxygen Saturation :   97 %   Dinorah Torres CMA - 1/14/2019 10:45 AM CST   Health Status   Allergies Verified? :   Yes   Medication History Verified? :   Yes   Pre-Visit Planning Status :   Completed   Tobacco Use? :   Never smoker   Dinorah Torres CMA - 1/14/2019 10:45 AM CST   Consents   Consent for Immunization Exchange :   Consent Granted   Consent for Immunizations to Providers :   Consent Granted   Dinorah Torres CMA - 1/14/2019 10:45 AM CST   Meds / Allergies   (As Of: 1/14/2019 10:49:44 AM CST)   Allergies (Active)   codeine  Estimated Onset Date:   Unspecified ; Created By:   [employer] User for [id number]; Reaction Status:   Active ; Substance:   codeine ; Updated By:   Generated Domain User for [id number]; Source:   Patient ; Reviewed Date:   1/13/2019 8:41 AM CST        Medication List   (As Of: 1/14/2019 10:49:44 AM CST)   Prescription/Discharge Order    DULoxetine  :   DULoxetine ; Status:   Prescribed ; Ordered As Mnemonic:   DULoxetine 30 mg oral delayed release capsule ; Simple Display Line:   30 mg, 1 cap(s), Oral, bid, 60 EA, 1 Refill(s) ; Ordering Provider:   Bridgett Rendon; Catalog Code:   DULoxetine ; Order Dt/Tm:   12/24/2018 11:02:46 AM          ondansetron  :   ondansetron ; Status:   Prescribed ; Ordered As Mnemonic:   Zofran 4 mg oral tablet ; Simple Display Line:   4 mg, 1 tab(s), PO, q8 hrs, 10 tab(s), 0 Refill(s) ; Ordering  Provider:   Bridgett Rendon; Catalog Code:   ondansetron ; Order Dt/Tm:   1/13/2019 8:49:42 AM          SUMAtriptan  :   SUMAtriptan ; Status:   Prescribed ; Ordered As Mnemonic:   Imitrex 25 mg oral tablet ; Simple Display Line:   See Instructions, 1 tab(s) PO Once  may repeat dose in 2 hours if needed, PRN: for migraine headache, 9 tab(s), 0 Refill(s) ; Ordering Provider:   Bridgett Rendon; Catalog Code:   SUMAtriptan ; Order Dt/Tm:   12/16/2018 1:10:01 PM

## 2022-02-15 NOTE — LETTER
(Inserted Image. Unable to display)   March 27, 2019      ELISENG ECHOLS   [address], [place] [id number]        Dear ELI,      Thank you for selecting [facility] (previously [place]) for your healthcare needs.      Our records indicate you are due for the following services:        Follow-up office visit.      To schedule an appointment or if you have further questions, please contact your primary clinic:   [facility]       [phone]   [facility]       [phone]              [facility]     [phone]      Powered by [employer]    Sincerely,    Cris Gomez MD

## 2022-02-15 NOTE — PROGRESS NOTES
Chief Complaint    Follow up on CT scan    Continue PT?    Shot for right neck pain? Getting stiffer over the last few weeks. 199.8  History of Present Illness      Eil is here for discussion of recent head CT results.  Continues with PT with improvement, still has tight neck and upper back.  She had improvement with myofascial release.      She still has some trouble with concentration.  Review of Systems          ROS reviewed and negative except for symptoms noted in HPI.  Physical Exam   Vitals & Measurements    T: 97.3  F (Tympanic)  HR: 66 (Peripheral)  BP: 142/78  SpO2: 98%     HT: 67 in  HT: 67.5 in  WT: 199.8 lb  BMI: 31.29           General:  Alert and oriented, No acute distress.            Neck:  Supple, Non-tender.            Respiratory:  Respirations are non-labored.            Cardiovascular:  Normal rate.            Musculoskeletal:       Tender right mid trapezial trigger point          Integumentary:  Warm, Dry.            Psychiatric:  Cooperative, Appropriate mood & affect.              Assessment/Plan       1. Myofascial pain on right side (M79.18)         We have reviewed her CT        Trapezial trigger point injected with 1cc of bupivacaine after cleansing area with alcohol, adhesive dressing applied        Continue with PT  Patient Information     Name:ELI ECHOLS      Address:      [address] [id number]     Sex:Female     YOB: 1970     [phone]     Emergency Contact:IFTIKHAR ECHOLS     MRN:[id number]     FIN:[id number]     Location:[facility]     Date of Service:04/21/2021      Primary Care Physician:       Cris Gomez MD, [phone]      Attending Physician:       Irwin Eckert MD, [phone]  Problem List/Past Medical History    Ongoing     Bipolar 1 disorder     Chronic tension headache     SANTIAGO (generalized anxiety disorder)     History of eye surgery     Hypomania     Migraine headache     Obesity      Oscillopsia    Historical     Inpatient stay       Comments: @[facility], [place] - Hypomania - Bipolar 2 versus cyclothymia     Pregnancy     Pregnancy     Pregnancy     Pregnancy     Pregnancy     Pregnancy  Procedure/Surgical History      delivery NOS, unspecified (2011)           Other Postsurgical Status (2011)            section           Tonsillectomy        Medications    Claritin-D 12 Hour oral tablet, extended release, 1 tab(s), Oral, q12 hrs, PRN    clonazePAM 1 mg oral tablet, 1 mg= 1 tab(s), Oral, bid, 1 refills    LaMICtal 100 mg oral tablet, 200 mg= 2 tab(s), Oral, daily, 3 refills    omeprazole 40 mg oral delayed release capsule, 40 mg= 1 cap(s), Oral, daily, 3 refills    QUEtiapine 25 mg oral tablet, 25 mg= 1 tab(s)  Allergies    Depakote (Diarrhea)    codeine  Social History    Smoking Status     Never smoker     Alcohol      Never     Electronic Cigarette/Vaping      Electronic Cigarette Use: Never.     Employment/School      Highest education level: University degree(s).     Exercise      Exercise frequency: Daily. Exercise type: Walking dogs, elliptical.     Home/Environment      Marital status: . Living situation: Home/Independent. Injuries/Abuse/Neglect in household: No.     Nutrition/Health      Type of diet: Regular.     Sexual      Identifies as female, Sexual orientation: Straight or heterosexual. History of STD: No. Uses condoms: No. Contraceptive Use Details: Birth control pill. History of sexual abuse: No.     Substance Abuse      Never     Tobacco      Never (less than 100 in lifetime)  Family History    Cancer of adrenal gland: Father.    Heart disease: Father.    Hypercholesterolemia: Father.    Lung disorder: Father.  Immunizations      Vaccine Date Status          influenza virus vaccine, inactivated 2020 Recorded          influenza virus vaccine, inactivated 10/28/2019 Given          influenza 2018 Recorded          Td  06/13/2015 Recorded          influenza, H1N1, live 12/09/2009 Recorded          MMR (measles/mumps/rubella) 08/22/2007 Recorded          meningococcal conjugate vaccine 08/22/2007 Recorded          Hep B 08/22/2007 Recorded          Hep B 06/22/2007 Recorded          MMR (measles/mumps/rubella) 06/22/2007 Recorded          Td 04/12/2001 Recorded

## 2022-02-15 NOTE — PROGRESS NOTES
Chief Complaint    med check - needs to discuss. Julio Cesar appt on 8/15.  History of Present Illness      patient present to clinic today for follow up mood      also see charlie 7 and Phq 9      She was seen by psychiatry and started on Lamictal.  She is at 25 mg tablets and was told that she was supposed to get up to 8 tablets daily.  She just started the tablets daily but is going to run out of them.  She is also on Depakote.  She is tried to contact her psychiatrist and they have not returned her calls.  She is hoping that I can bridge her on the Lamictal until she is able to actually follow-up with her psychiatrist.  She has tried sending messages and is told that if it is for a prescription renewal that she should contact the pharmacy she is contacted the pharmacy is overall feeling very frustrated because she is not sure how she is supposed to get the medicine and do what he has told her to do.      Explained the patient that this is not a medication that I am familiar with prescribing.  However when I look at my reference tools that explains that somebody on valproic acid should not take more than 100 mg p.o. daily.  As I am unfamiliar with this medication and a little nervous about prescribing it for 200 mg p.o. daily however I do acknowledge that if somebody tapers up on a medication they need to taper back down.  She does have some of the 25 mg available.  I would like her to start tapering down to 100 mg daily.  She could decrease by 25 mg every 3 days.  She does have an appointment coming up with Dr. Harrell and a couple of weeks.      Review of systems is negative except as per HPI including:  no fevers, chills, sore throat, runny nose, nausea, vomiting, constipation, diarrhea, rash or new skin lesions, chest pain, palpitations, slurred speech, new paresthesia, shortness of breath or wheeze.  no SI or HI, no a/v hallucinations      Exam:      General: alert and oriented ×3 no acute distress.      HEENT:  pupils are equal round and reactive to light extraocular motion is intact. Normocephalic and atraumatic.       Hearing is grossly normal and there is no otorrhea.       Nares are patent there is no rhinorrhea.       Mucous membranes are moist and pink.      Chest: has bilateral rise with no increased work of breathing.      Cardiovascular: normal perfusion and brisk capillary refill.      Musculoskeletal: no gross focal abnormalities and normal gait.      Neuro: no gross focal abnormalities and memory seems intact.      Psychiatric: speech is clear and coherent and fluent. Patient dressed appropriately for the weather. Mood is appropriate and affect is full.                     Discussed with patient to return to clinic if symptoms worsen or do not improve.  Physical Exam   Vitals & Measurements    HR: 88(Peripheral)  BP: 124/76     WT: 207 lb   Assessment/Plan       Bipolar illness (F31.9)        Patient will decrease her Lamictal by 25 mg every 3 days for goal of 100 mg p.o. daily.  I will put provide a prescription for 100 mg pill with enough to bridge her until she sees her psychiatrist.  Would also like for her to ask her  in to help watch for signs of clifford.  She agrees that this is a good idea and will certainly let me know if there is anything else I can do to help her out.  25 minutes was spent with patient in direct face-to-face contact of which greater than 50% of the time spent counseling patient coordinating care.         Ordered:          [id number] office outpatient visit 25 minutes (Charge), Quantity: 1, Bipolar illness                Orders:         divalproex sodium, = 1 tab(s) ( 250 mg ), Oral, daily, # 30 tab(s), 1 Refill(s), Type: Maintenance, Pharmacy: [id number], 1 tab(s) Oral daily, (Completed)         lamoTRIgine, = 1 tab(s) ( 100 mg ), Oral, daily, # 21 tab(s), 0 Refill(s), Type: Maintenance, Pharmacy: [id number], 1 tab(s) Oral daily, (Ordered)      25 minutes spent  with patient in direct face to face contact, > 50% of time spent counseling and coordinating care.   Patient Information     Name:ELI ECHOLS      Address:      [address] [address]     Sex:Female     YOB: 1970     [phone]     MRN:[id number]     FIN:[id number]     Location:[facility]     Date of Service:2019      Primary Care Physician:       NONE ,       Attending Physician:       Cris Gomez MD, [phone]  Problem List/Past Medical History    Ongoing     Chronic tension headache     SANTIAGO (generalized anxiety disorder)     Hypomania     Migraine headache     Obesity    Historical     Inpatient stay       Comments: @, [place] - Hypomania - Bipolar 2 versus cyclothymia     Pregnancy     Pregnancy     Pregnancy     Pregnancy     Pregnancy     Pregnancy  Procedure/Surgical History      delivery NOS, unspecified (2011)           Other Postsurgical Status (2011)            section           Tonsillectomy        Medications    clonazePAM 1 mg oral tablet, 1 mg= 1 tab(s), Oral, bid    divalproex sodium 500 mg oral delayed release tablet, 500 mg= 1 tab(s), Oral, daily    LaMICtal 100 mg oral tablet, 100 mg= 1 tab(s), Oral, daily    QUEtiapine 50 mg oral tablet, extended release, See Instructions, 1 refills,   Still taking, not as prescribed: taking 2 tabs daily  Allergies    codeine  Social History    Smoking Status - 2019     Never smoker     Alcohol      Never, 2019     Employment/School      Highest education level: University degree(s)., 2019     Exercise      Exercise frequency: Daily. Exercise type: Walking dogs, elliptical., 2019     Home/Environment      Marital status: . Living situation: Home/Independent. Injuries/Abuse/Neglect in household: No., 2019     Nutrition/Health      Type of diet: Regular., 2019     Sexual      Identifies as  female, Sexual orientation: Straight or heterosexual. History of STD: No. Uses condoms: No. Contraceptive Use Details: Birth control pill. History of sexual abuse: No., 03/14/2019     Substance Abuse      Never, 03/14/2019     Tobacco      Never (less than 100 in lifetime), 03/14/2019  Family History    Cancer of adrenal gland: Father.    Heart disease: Father.    Hypercholesterolemia: Father.    Lung disorder: Father.  Immunizations      Vaccine Date Status      influenza 01/13/2018 Recorded      Td 06/13/2015 Recorded      meningococcal conjugate vaccine 08/22/2007 Recorded      Hep B 08/22/2007 Recorded      MMR (measles/mumps/rubella) 08/22/2007 Recorded      Hep B 06/22/2007 Recorded  Lab Results       Lab Results (Last 4 results within 90 days)        Sodium Level: 138 mmol/L [135 mmol/L - 146 mmol/L] (05/07/19 11:58:00)       Potassium Level: 4.2 mmol/L [3.5 mmol/L - 5.3 mmol/L] (05/07/19 11:58:00)       Chloride Level: 106 mmol/L [98 mmol/L - 110 mmol/L] (05/07/19 11:58:00)       CO2 Level: 23 mmol/L [20 mmol/L - 32 mmol/L] (05/07/19 11:58:00)       Glucose Level: 76 mg/dL [65 mg/dL - 99 mg/dL] (05/07/19 11:58:00)       BUN: 16 mg/dL [7 mg/dL - 25 mg/dL] (05/07/19 11:58:00)       Creatinine Level: 0.8 mg/dL [0.5 mg/dL - 1.1 mg/dL] (05/07/19 11:58:00)       BUN/Creat Ratio: NOT APPLICABLE [6  - 22] (05/07/19 11:58:00)       eGFR: 88 mL/min/1.73m2 (05/07/19 11:58:00)       eGFR : 102 mL/min/1.73m2 (05/07/19 11:58:00)       Calcium Level: 8.9 mg/dL [8.6 mg/dL - 10.2 mg/dL] (05/07/19 11:58:00)       Bilirubin Total: 0.4 mg/dL [0.2 mg/dL - 1.2 mg/dL] (05/07/19 11:58:00)       Alkaline Phosphatase: 81 unit/L [33 unit/L - 115 unit/L] (05/07/19 11:58:00)       AST/SGOT: 17 unit/L [10 unit/L - 35 unit/L] (05/07/19 11:58:00)       ALT/SGPT: 15 unit/L [6 unit/L - 29 unit/L] (05/07/19 11:58:00)       Protein Total: 6.5 gm/dL [6.1 gm/dL - 8.1 gm/dL] (05/07/19 11:58:00)       Albumin Level: 3.8 gm/dL [3.6  gm/dL - 5.1 gm/dL] (05/07/19 11:58:00)       Globulin: 2.7 [1.9  - 3.7] (05/07/19 11:58:00)       A/G Ratio: 1.4 [1  - 2.5] (05/07/19 11:58:00)       T4 Free: 0.8 ng/dL [0.8 ng/dL - 1.8 ng/dL] (05/07/19 11:58:00)       T3 Free: 2.9 pg/mL [2.3 pg/mL - 4.2 pg/mL] (05/07/19 11:58:00)       TSH: 2.54 mIU/L (05/07/19 11:58:00)

## 2022-02-15 NOTE — TELEPHONE ENCOUNTER
---------------------  From: Lynne Stallings   To: Referral Coordinators Pool ([id number]);     Sent: 6/25/2021 3:59:42 PM CDT  Subject: General Message     MRI orderedNoted.

## 2022-02-15 NOTE — NURSING NOTE
Depression Screening Entered On:  3/17/2020 3:39 PM CDT    Performed On:  3/13/2020 3:38 PM CDT by Pastor Ford CMA               Depression Screening   Little Interest - Pleasure in Activities :   Not at all   Feeling Down, Depressed, Hopeless :   Not at all   Initial Depression Screen Score :   0    Trouble Falling or Staying Asleep :   More than half the days   Feeling Tired or Little Energy :   More than half the days   Poor Appetite or Overeating :   Not at all   Feeling Bad About Yourself :   Not at all   Trouble Concentrating :   Several days   Moving or Speaking Slowly :   Not at all   Thoughts Better Off Dead or Hurting Self :   Not at all   Detailed Depression Screen Score :   5    Total Depression Screen Score :   5    SANTIAGO Difficulty with Work, Home, Others :   Somewhat difficult   Pastor Ford CMA - 3/17/2020 3:38 PM CDT

## 2022-02-15 NOTE — LETTER
(Inserted Image. Unable to display)     March 02, 2020      ELISENG ECHOLS   [address], [place] [id number]          Dear ELI,      Thank you for selecting [facility] (previously [facility]) for your healthcare needs.      Our records indicate you are due for the following services:     Follow-up office visit.      To schedule an appointment or if you have further questions, please contact your primary clinic:   [facility]       [phone]   [facility]       [phone]              [facility]     [phone]      Powered by [employer]    Sincerely,    Cris Gomez MD

## 2022-02-15 NOTE — NURSING NOTE
Depression Screening Entered On:  3/31/2019 3:04 PM CDT    Performed On:  3/31/2019 3:03 PM CDT by Neetu Wood LPN               Depression Screening   Little Interest - Pleasure in Activities :   Not at all   Feeling Down, Depressed, Hopeless :   Not at all   Initial Depression Screen Score :   0    Trouble Falling or Staying Asleep :   More than half the days   Feeling Tired or Little Energy :   More than half the days   Poor Appetite or Overeating :   Not at all   Feeling Bad About Yourself :   Not at all   Trouble Concentrating :   Not at all   Moving or Speaking Slowly :   Not at all   Thoughts Better Off Dead or Hurting Self :   Not at all   Detailed Depression Screen Score :   4    Total Depression Screen Score :   4    SANTIAGO Difficulty with Work, Home, Others :   Somewhat difficult   Depression Screening Comment :   does not use alcohol   Neetu Wood LPN - 3/31/2019 3:03 PM CDT

## 2022-02-15 NOTE — NURSING NOTE
Comprehensive Intake Entered On:  4/17/2019 10:34 AM CDT    Performed On:  4/17/2019 10:29 AM CDT by Dinorah Torres CMA               Summary   Chief Complaint :   f/u hospital. Headache, mood.    Menstrual Status :   Menarcheal   Weight Measured :   198.6 lb(Converted to: 198 lb 10 oz, 90.08 kg)    Systolic Blood Pressure :   122 mmHg   Diastolic Blood Pressure :   80 mmHg   Mean Arterial Pressure :   94 mmHg   Peripheral Pulse Rate :   89 bpm   BP Site :   Right arm   BP Method :   Manual   HR Method :   Electronic   Temperature Tympanic :   97.7 DegF(Converted to: 36.5 DegC)  (LOW)    Oxygen Saturation :   99 %   Dinorah Torres CMA - 4/17/2019 10:29 AM CDT   Health Status   Allergies Verified? :   Yes   Medication History Verified? :   Yes   Pre-Visit Planning Status :   Completed   Tobacco Use? :   Never smoker   Dinorah Torres CMA - 4/17/2019 10:29 AM CDT   Consents   Consent for Immunization Exchange :   Consent Granted   Consent for Immunizations to Providers :   Consent Granted   Dinorah Torres CMA - 4/17/2019 10:29 AM CDT   Meds / Allergies   (As Of: 4/17/2019 10:34:03 AM CDT)   Allergies (Active)   codeine  Estimated Onset Date:   Unspecified ; Created By:   Generated Domain User for [id number]; Reaction Status:   Active ; Substance:   codeine ; Updated By:   Generated Domain User for [id number]; Source:   Patient ; Reviewed Date:   3/12/2019 10:15 AM CDT        Medication List   (As Of: 4/17/2019 10:34:03 AM CDT)   Prescription/Discharge Order    desogestrel-ethinyl estradiol  :   desogestrel-ethinyl estradiol ; Status:   Prescribed ; Ordered As Mnemonic:   Desogen 0.15 mg-0.03 mg oral tablet ; Simple Display Line:   1 tab(s), PO, Daily, take 21 days of active tablets then skip placebos and start the next pack, 90 tab(s), 3 Refill(s) ; Ordering Provider:   Cris Gomez MD; Catalog Code:   desogestrel-ethinyl estradiol ; Order Dt/Tm:   3/12/2019 10:54:40 AM          escitalopram  :   escitalopram ; Status:    Prescribed ; Ordered As Mnemonic:   Lexapro 10 mg oral tablet ; Simple Display Line:   10 mg, 1 tab(s), PO, Daily, 30 tab(s), 1 Refill(s) ; Ordering Provider:   Cris Gomez MD; Catalog Code:   escitalopram ; Order Dt/Tm:   3/29/2019 9:24:13 AM          LORazepam  :   LORazepam ; Status:   Prescribed ; Ordered As Mnemonic:   LORazepam 1 mg oral tablet ; Simple Display Line:   1 mg, 1 tab(s), Oral, tid, PRN: anxiety, 14 tab(s), 0 Refill(s) ; Ordering Provider:   Alexey Lee MD; Catalog Code:   LORazepam ; Order Dt/Tm:   4/4/2019 12:03:23 PM          QUEtiapine  :   QUEtiapine ; Status:   Prescribed ; Ordered As Mnemonic:   QUEtiapine 25 mg oral tablet ; Simple Display Line:   1/2-1.5 tab(s), Oral, qhs, 45 EA, 1 Refill(s) ; Ordering Provider:   Cris Gomez MD; Catalog Code:   QUEtiapine ; Order Dt/Tm:   3/29/2019 9:19:03 AM          QUEtiapine  :   QUEtiapine ; Status:   Prescribed ; Ordered As Mnemonic:   QUEtiapine 25 mg oral tablet ; Simple Display Line:   1/2-1 tab(s), Oral, qhs, 30 EA, 1 Refill(s) ; Ordering Provider:   Cris Gomez MD; Catalog Code:   QUEtiapine ; Order Dt/Tm:   3/12/2019 10:50:46 AM            Home Meds    clonazePAM  :   clonazePAM ; Status:   Documented ; Ordered As Mnemonic:   clonazePAM 1 mg oral tablet ; Simple Display Line:   1 mg, 1 tab(s), Oral, bid, 0 Refill(s) ; Catalog Code:   clonazePAM ; Order Dt/Tm:   4/17/2019 10:32:48 AM
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
## 2022-02-15 NOTE — PROGRESS NOTES
Chief Complaint    Anxiety medication check  History of Present Illness      patient present to clinic today for follow up mood      She has established care with Dr. Harrell for psychiatry.  He has increased her Depakote to 1000 mg of the extended release daily.  She does think that is helping with her mood and she can now tell when she is starting to have racing thoughts.  This is more insight than she had in the past.  Unfortunately she is noticed that her vision has worsened.  She has a history of strabismus.  She has had several surgeries to correct this.  Review of medication side effects do show that it can cause some blurred vision or vision changes.  Does not mention that it can cause worsening of his strabismus.  She is also concerned that she has had weight gain of 3 pounds.  She does not want to continue with medication that is going to significantly increase her risk of weight gain if there are alternatives available.      Review of systems is negative except as per HPI including:  no fevers, chills, sore throat, runny nose, nausea, vomiting, constipation, diarrhea, rash or new skin lesions, chest pain, palpitations, slurred speech, new paresthesia, shortness of breath or wheeze.  no SI or HI, no a/v hallucinations      Exam:      General: alert and oriented ×3 no acute distress.      HEENT: pupils are equal round and reactive to light extraocular motion is intact.  Left eye does not quite line up with the right eye.  Strabismus is evident.  Normocephalic and atraumatic.       Hearing is grossly normal and there is no otorrhea.       Nares are patent there is no rhinorrhea.       Mucous membranes are moist and pink.      Chest: has bilateral rise with no increased work of breathing.      Cardiovascular: normal perfusion and brisk capillary refill.      Musculoskeletal: no gross focal abnormalities and normal gait.      Neuro: no gross focal abnormalities and memory seems intact.      Psychiatric:  speech is clear and coherent and fluent. Patient dressed appropriately for the weather. Mood is appropriate and affect is full.                     Discussed with patient to return to clinic if symptoms worsen or do not improve.  Physical Exam   Vitals & Measurements    T: 98.8   F (Tympanic)  HR: 89(Peripheral)  BP: 118/82  SpO2: 99%     HT: 67.5 in  WT: 200 lb   Assessment/Plan       Bipolar illness (F31.9)       SANTIAGO (generalized anxiety disorder) (F41.1)       Hypomania (F30.8)       Medication monitoring encounter (Z51.81)       Vision changes (H53.9)        Contacted patient's ophthalmologist, he does report that there are many medications that are centrally acting including Depakote that can alter the brains ability to control strabismus.  We discussed that I would like for patient to have an opportunity to follow-up with her psychiatrist to determine if they are going to try changing her mood stabilizer and her antipsychotic medication and then once her mood is stable to have her follow-up with Dr. Alegria to see if there is anything he can do to help with the remaining strabismus.  He thought that this was a good plan I related to the patient she was also in agreement.       Orders:         Comprehensive Metabolic Panel* ([facility]), Specimen Type: Serum, Collection Date: 05/07/19 11:17:00 CDT         T3, free* ([facility]), Specimen Type: Serum, Collection Date: 05/07/19 11:17:00 CDT         T4, free* ([facility]), Specimen Type: Serum, Collection Date: 05/07/19 11:17:00 CDT         TSH* ([facility]), Specimen Type: Serum, Collection Date: 05/07/19 11:17:00 CDT  Patient Information     Name:ELI ECHOLS      Address:      [address] [place] [id number]     Sex:Female     YOB: 1970     [phone]     MRN:[id number]     [id number]     Location:[facility]     Date of Service:05/07/2019      Primary Care Physician:       NONE ,       Attending Physician:        Jason THOMAS, [facility], [phone]  Problem List/Past Medical History    Ongoing     Chronic tension headache     SANTIAGO (generalized anxiety disorder)     Hypomania     Migraine headache     Obesity    Historical     Inpatient stay       Comments: @[facility], [place] - Hypomania - Bipolar 2 versus cyclothymia     Pregnancy     Pregnancy     Pregnancy     Pregnancy     Pregnancy     Pregnancy  Procedure/Surgical History      delivery NOS, unspecified (2011)           Other Postsurgical Status (2011)            section           Tonsillectomy        Medications     clonazePAM 1 mg oral tablet: 1 mg, 1 tab(s), Oral, bid, 0 Refill(s).     Depakote  mg oral tablet, extended release: 250 mg, 1 tab(s), Oral, daily, 30 tab(s), 1 Refill(s).     QUEtiapine 50 mg oral tablet, extended release: See Instructions, 1 tab(s) Oral qpm x 1 day, then 2 po Qpm x 1 day, then 3 po qhs, 90 EA, 1 Refill(s).      Allergies    codeine  Social History    Smoking Status - 2019     Never smoker     Alcohol      Never, 2019     Employment and Education      Highest education level: University degree(s)., 2019     Exercise and Physical Activity      Exercise frequency: Daily. Exercise type: Walking dogs, elliptical., 2019     Home and Environment      Marital status: . Living situation: Home/Independent. Injuries/Abuse/Neglect in household: No., 2019     Nutrition and Health      Type of diet: Regular., 2019     Sexual      Identifies as female, Sexual orientation: Straight or heterosexual. History of STD: No. Uses condoms: No. Contraceptive Use Details: Birth control pill. History of sexual abuse: No., 2019     Substance Abuse      Never, 2019     Tobacco      Never (less than 100 in lifetime), 2019  Family History    Cancer of adrenal gland: Father.    Heart disease: Father.    Hypercholesterolemia: Father.    Lung disorder:  Father.  Immunizations      Vaccine Date Status      influenza 01/13/2018 Recorded      Td 06/13/2015 Recorded      meningococcal conjugate vaccine 08/22/2007 Recorded      Hep B 08/22/2007 Recorded      MMR (measles/mumps/rubella) 08/22/2007 Recorded      Hep B 06/22/2007 Recorded  Lab Results       Lab Results (Last 4 results within 90 days)        Sodium Level: 137 mmol/L [135 mmol/L - 146 mmol/L] (03/04/19 08:33:00)       Potassium Level: 4 mmol/L [3.5 mmol/L - 5.3 mmol/L] (03/04/19 08:33:00)       Chloride Level: 103 mmol/L [98 mmol/L - 110 mmol/L] (03/04/19 08:33:00)       CO2 Level: 23 mmol/L [20 mmol/L - 32 mmol/L] (03/04/19 08:33:00)       Glucose Level: 85 mg/dL [65 mg/dL - 99 mg/dL] (03/04/19 08:33:00)       BUN: 18 mg/dL [7 mg/dL - 25 mg/dL] (03/04/19 08:33:00)       Creatinine Level: 0.84 mg/dL [0.5 mg/dL - 1.1 mg/dL] (03/04/19 08:33:00)       BUN/Creat Ratio: NOT APPLICABLE [6  - 22] (03/04/19 08:33:00)       eGFR: 83 mL/min/1.73m2 (03/04/19 08:33:00)       eGFR : 96 mL/min/1.73m2 (03/04/19 08:33:00)       Calcium Level: 9.4 mg/dL [8.6 mg/dL - 10.2 mg/dL] (03/04/19 08:33:00)       Hgb A1c: 5.2 (03/04/19 08:33:00)       Cholesterol: 181 mg/dL (03/04/19 08:33:00)       Non-HDL Cholesterol: 103 (03/04/19 08:33:00)       HDL: 78 mg/dL (03/04/19 08:33:00)       Cholesterol/HDL Ratio: 2.3 (03/04/19 08:33:00)       LDL: 84 (03/04/19 08:33:00)       Triglyceride: 101 mg/dL (03/04/19 08:33:00)       TSH: 1.39 mIU/L (03/04/19 08:33:00)       HPV High Risk: NOT DETECTED (03/12/19 12:49:00)  PHQ 9 today is 15.  25 minutes was spent with patient direct face-to-face contact with greater than 50% of the time was spent counseling patient coordinating care.
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
## 2022-02-15 NOTE — NURSING NOTE
Depression Screening Entered On:  9/11/2020 8:20 AM CDT    Performed On:  9/10/2020 8:20 AM CDT by Gia Hunt CMA               Depression Screening   Little Interest - Pleasure in Activities :   Not at all   Feeling Down, Depressed, Hopeless :   More than half the days   Initial Depression Screen Score :   2 Score   Poor Appetite or Overeating :   Not at all   Trouble Falling or Staying Asleep :   Several days   Feeling Tired or Little Energy :   Several days   Feeling Bad About Yourself :   Not at all   Trouble Concentrating :   Not at all   Moving or Speaking Slowly :   Not at all   Thoughts Better Off Dead or Hurting Self :   Not at all   SANTIAGO Difficulty with Work, Home, Others :   Somewhat difficult   Detailed Depression Screen Score :   2    Total Depression Screen Score :   4    Gia Hunt CMA - 9/11/2020 8:20 AM CDT

## 2022-02-15 NOTE — PROGRESS NOTES
Patient:   ELI ECHOLS            MRN: [id number]            FIN: [id number]               Age:   48 years     Sex:  Female     :  1970   Associated Diagnoses:   Anxiety; Chronic tension headache; Obesity   Author:   Alexey Lee MD      Chief Complaint   2019 11:48 AM CDT    Med refills      History of Present Illness   see chief complaint as noted above and confirmed with the patient   48 year old female with Chronic tension headaches and anxiety presenting for a medication refill. She states she is currently tapering from duloxetine and will be starting Lexapro once she is off the duloxetine. She states that she has been doing good on the tapering but  She has been taking 1 mg of Lorazepam daily to help her with the shaking and anxiety she is has been having on the Duloxetine. She is scheduled to follow up with her primary next week but will not have enough lorazepam to make it to that appointment.       Review of Systems   Constitutional:  No fever, No chills, No fatigue.    Ear/Nose/Mouth/Throat:  No sore throat.    Respiratory:  No shortness of breath.    Cardiovascular:  No chest pain.    Gastrointestinal:  No nausea, No vomiting.    Genitourinary:  Negative.    Musculoskeletal:  No back pain.    Integumentary:  No rash.    Neurologic:  No headache.    Psychiatric:  Anxiety.              Health Status   Allergies:    Allergic Reactions (Selected)  Severity Not Documented  Codeine (No reactions were documented)   Medications:  (Selected)   Prescriptions  Prescribed  DULoxetine 30 mg oral delayed release capsule: = 1 cap(s) ( 30 mg ), Oral, daily, # 30 cap(s), 0 Refill(s), Type: Maintenance, Pharmacy: [id number], 1 cap(s) Oral daily  DULoxetine 30 mg oral delayed release capsule: = 1 cap(s) ( 30 mg ), Oral, daily, # 7 cap(s), 0 Refill(s), Type: Maintenance, Pharmacy: [facility], 1 cap(s) Oral daily,x7 day(s)  Desogen 0.15 mg-0.03 mg oral tablet: 1 tab(s), PO,  Daily, Instructions: take 21 days of active tablets then skip placebos and start the next pack, # 90 tab(s), 3 Refill(s), Type: Maintenance, Pharmacy: [id number], 1 tab(s) Oral daily,Instr:take 21 days of active tablets t...  LORazepam 1 mg oral tablet: = 1 tab(s) ( 1 mg ), Oral, tid, PRN: anxiety, # 10 tab(s), 0 Refill(s), Type: Maintenance  Lexapro 10 mg oral tablet: = 1 tab(s) ( 10 mg ), PO, Daily, # 30 tab(s), 1 Refill(s), Type: Maintenance, Pharmacy: [facility], 1 tab(s) Oral daily  QUEtiapine 25 mg oral tablet: 1/2-1 tab(s), Oral, qhs, # 30 EA, 1 Refill(s), Type: Maintenance, [id number], 1/2-1 tab(s) Oral qhs  QUEtiapine 25 mg oral tablet: 1/2-1.5 tab(s), Oral, qhs, # 45 EA, 1 Refill(s), Type: Maintenance, Pharmacy: [facility], 1/2-1.5 tab(s) Oral qhs   Problem list:    All Problems  Obesity / SNOMED CT 9892754520 / Probable  Migraine headache / SNOMED CT 64592968 / Confirmed  Chronic tension headache / SNOMED CT 289295243 / Confirmed  Resolved: Pregnancy  Resolved: Pregnancy / SNOMED CT 457891609  Resolved: Pregnancy / SNOMED CT 188163112  Resolved: Pregnancy / SNOMED CT 013334880  Resolved: Pregnancy / SNOMED CT 271194793  Resolved: Pregnancy / SNOMED CT 406194938      Histories   Past Medical History:    Resolved  Pregnancy (346628028): Onset on 10/9/1993 at 23 years.  Resolved in  at 23 years.  Pregnancy (570089775): Onset on 1991 at 21 years.  Resolved in  at 21 years.  Pregnancy:  Resolved.  Pregnancy (854207132):  Resolved on 3/29/1992 at 21 years.  Pregnancy (790240458):  Resolved on 1993 at 23 years.  Pregnancy (916696176):  Resolved on 3/6/1996 at 25 years.   Family History:    Heart disease  Father  Cancer of adrenal gland  Father  Hypercholesterolemia  Father  Lung disorder  Father     Procedure history:    Tonsillectomy (935471482).   section (41745358).   Social History:        Alcohol Assessment             Never      Tobacco Assessment            Never (less than 100 in lifetime)      Substance Abuse Assessment            Never      Employment and Education Assessment            Highest education level: University degree(s).      Home and Environment Assessment            Marital status: .  Living situation: Home/Independent.  Injuries/Abuse/Neglect in household: No.      Nutrition and Health Assessment            Type of diet: Regular.      Exercise and Physical Activity Assessment            Exercise frequency: Daily.  Exercise type: Walking dogs, elliptical.      Sexual Assessment            Identifies as female, Sexual orientation: Straight or heterosexual.  History of STD: No.  Uses condoms: No.               Contraceptive Use Details: Birth control pill.  History of sexual abuse: No.      Physical Examination   Vital Signs   4/4/2019 11:48 AM CDT Peripheral Pulse Rate 64 bpm    Systolic Blood Pressure 118 mmHg    Diastolic Blood Pressure 74 mmHg    Mean Arterial Pressure 89 mmHg      Measurements from flowsheet : Measurements   4/4/2019 11:48 AM CDT Height Measured - Standard 67.5 in    Weight Measured - Standard 195.2 lb    BSA 2.05 m2    Body Mass Index 30.12 kg/m2  HI      General:  Alert and oriented, No acute distress.    Eye:  Pupils are equal, round and reactive to light, Normal conjunctiva.    HENT:  Oral mucosa is moist.    Neck:  Supple.    Respiratory:  Respirations are non-labored.    Cardiovascular:  Normal rate, Regular rhythm, No edema.    Gastrointestinal:  Non-distended.    Musculoskeletal:  Normal gait.    Integumentary:  Warm, No rash.    Psychiatric:  Cooperative, Appropriate mood & affect, Normal judgment.       Review / Management   Results review      Impression and Plan   Diagnosis     Anxiety (LBV71-YO F41.9).     Chronic tension headache (RSP67-RP G44.229).     Obesity (SDJ37-ZD E66.9).     Plan:  Discussed with patient that we could send in a refill for her. She understands that  this is not a long term medication. She will keep her appt scheduled for next week with her primary.  Reviewed expected course, what to watch for, and when to return.   I, Liss Mohamud CMA, acted solely as a scribe for, and in the presence of Dr. Alexey Lee who performed the service..

## 2022-02-15 NOTE — LETTER
(Inserted Image. Unable to display)                       2021  Re:  ELI ECHOLS  :  1970        [facility]   [address]  [place] [address]      Dear    [facility],    The following patient has been referred to your office/practice:  ELI ECHOLS     Appointment is pending. Please contact patient to schedule.        Please refer to the attached clinical documentation for a summary of ELI's care.  Please do not hesitate to contact our office if any additional clinical questions arise. All relevant records and transition of care documents should be mailed or faxed.     Your assistance in providing continuity of care is appreciated.         Sincerely,   [address]  P) [phone] (F) [phone]

## 2022-02-15 NOTE — NURSING NOTE
Quick Intake Entered On:  4/6/2021 4:00 PM CDT    Performed On:  4/6/2021 4:00 PM CDT by Irwin Jc MD               Summary   Menstrual Status :   Menarcheal   Height Measured :   67 in(Converted to: 5 ft 7 in, 170.18 cm)    Height/Length Estimated :   67.5 in(Converted to: 5 ft 7 in, 171.45 cm)    Systolic Blood Pressure :   142 mmHg (HI)    Diastolic Blood Pressure :   92 mmHg (HI)    Mean Arterial Pressure :   109 mmHg   Irwin Jc MD - 4/6/2021 4:00 PM CDT

## 2022-02-15 NOTE — NURSING NOTE
Depression Screening Entered On:  1/15/2019 5:34 PM CST    Performed On:  1/14/2019 5:34 PM CST by Erica Gordillo               Depression Screening   Feeling Down, Depressed, Hopeless :   Not at all   Little Interest - Pleasure in Activities :   Not at all   Initial Depression Screen Score :   0    Trouble Falling or Staying Asleep :   More than half the days   Feeling Tired or Little Energy :   Several days   Poor Appetite or Overeating :   Several days   Feeling Bad About Yourself :   Not at all   Trouble Concentrating :   Not at all   Moving or Speaking Slowly :   Not at all   Thoughts Better Off Dead or Hurting Self :   Not at all   Detailed Depression Screen Score :   4    Total Depression Screen Score :   4    Erica Gordillo - 1/15/2019 5:34 PM CST

## 2022-02-15 NOTE — LETTER
(Inserted Image. Unable to display)   January 09, 2019        ELI ONESIMO   [address] [place], [place] [id number]        Dear ELI,      Thank you for selecting [facility] for your healthcare needs.    Our records indicate you are due for the following services:     Non-Fasting Labs    If you had your labs done at another facility or with Direct Access Lab Testing at [facility] beds at the [facility], please bring in a copy of the results to your next visit, mail a copy, or drop off a copy of your results to your Healthcare Provider.    To schedule an appointment or if you have further questions, please contact your primary clinic:   [facility]       [phone]   [facility]       [phone]              [facility]     [phone]      Powered by [employer] and [employer]    Sincerely,    Cris Gomez MD
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
## 2022-02-15 NOTE — LETTER
(Inserted Image. Unable to display)       March 06, 2019      ELI ECHOLS   [address] [place], [place] [id number]        Dear ELI,     Thank you for selecting [facility] for your healthcare needs. Below you will find the results of your recent test(s) done at our clinic.     Your results are normal!  Please come back for a follow up appointment if you are still having symptoms.       Result Name Current Result Reference Range   Sodium Level (mmol/L)  137 3/4/2019 135 - 146   Potassium Level (mmol/L)  4.0 3/4/2019 3.5 - 5.3   Chloride Level (mmol/L)  103 3/4/2019 98 - 110   CO2 Level (mmol/L)  23 3/4/2019 20 - 32   Glucose Level (mg/dL)  85 3/4/2019 65 - 99   BUN (mg/dL)  18 3/4/2019 7 - 25   Creatinine Level (mg/dL)  0.84 3/4/2019 0.50 - 1.10   BUN/Creat Ratio  NOT APPLICABLE 3/4/2019 6 - 22   eGFR (mL/min/1.73m2)  83 3/4/2019 > OR = 60 -    eGFR  (mL/min/1.73m2)  96 3/4/2019 > OR = 60 -    Calcium Level (mg/dL)  9.4 3/4/2019 8.6 - 10.2   Hgb A1c  5.2 3/4/2019  - <5.7   Cholesterol (mg/dL)  181 3/4/2019  - <200   Non-HDL Cholesterol  103 3/4/2019  - <130   HDL (mg/dL)  78 3/4/2019 >50 -    Cholesterol/HDL Ratio  2.3 3/4/2019  - <5.0   LDL  84 3/4/2019    Triglyceride (mg/dL)  101 3/4/2019  - <150   TSH (mIU/L)  1.39 3/4/2019      Please contact my practice at [phone] if you have any questions or concerns.     Sincerely,        Cris Gomez MD      What do your labs mean?  Below is a glossary of commonly ordered labs:  LDL   Bad Cholesterol   HDL   Good Cholesterol  AST/ALT   Liver Function   Cr/Creatinine   Kidney Function  Microalbumin   Kidney Function  BUN   Kidney Function  PSA   Prostate    TSH   Thyroid Hormone  HgbA1c   Diabetes Test   Hgb (Hemoglobin)   Red Blood Cells

## 2022-02-15 NOTE — TELEPHONE ENCOUNTER
---------------------  From: Marisol Powell (eRx Pool ([id number]_[facility]))   To: [facility] Message Pool ([id number]_[place]);     Sent: 9/3/2020 9:27:25 AM CDT  Subject: FW: Prescription renewal request     Med Refill      Date of last office visit and reason:  3/13/20 for med check with [facility]      Clonazepam last filled 3/13/20 #180 with 1 refill.     RTC order in chart:  No current RTC orders.  No mention of when to f/up in last note.             ---------------------  From: ELI ECHOLS  To: [facility]  Sent: 09/03/2020 07:38 a.m. CDT  Subject: Prescription renewal request  ELI ECHOLS is requesting renewal of the prescription(s) below and has submitted the following details:  Prescription(s) to be renewed  Medication: clonazePAM 1 mg oral tablet  Dose: 1 tab(s)  Frequency: 2 times a day  Rx date: 03/13/2020  Prescribed by: Cris Gomez MD  Reason for renewal:   Quantity:   Delivery option  Send to my pharmacy  [facility]  [address] [place] [address]  Phone: [phone]  Contact Information  By Secure Message---------------------  From: Dinorah Moore CMA ([facility] Message Pool ([id number]_[facility]))   To: Cris Gomez MD;     Sent: 9/3/2020 9:40:11 AM CDT  Subject: FW: Prescription renewal request---------------------  From: Cris Gomez MD   To: [facility] Message Pool ([id number]_[place]);     Sent: 9/3/2020 5:45:02 PM CDT  Subject: RE: Prescription renewal request     pls invite pt to set up a telemed appt, thanks---------------------  From: Dinorah Moore CMA ([facility] Message Pool ([id number]_[facility]))   To: Appointment Pool ([id number]_[place]);     Sent: 9/8/2020 9:25:28 AM CDT  Subject: FW: Prescription renewal request     Patient needs video appt for med refillsCALLED PATIENT LEFT MESSAGE FOR PATIENT TO CALL BACK AND SCHEDULEPT SCHEDULED 9.10 WITH DAVIE MARX IN CLINIC

## 2022-02-15 NOTE — NURSING NOTE
CAGE Assessment Entered On:  3/17/2020 3:38 PM CDT    Performed On:  3/13/2020 3:38 PM CDT by Pastor Ford CMA               Assessment   Have you ever felt you should cut down on your drinking :   No   Have people annoyed you by criticizing your drinking :   No   Have you ever felt bad or guilty about your drinking :   No   Have you ever taken a drink first thing in the morning to steady your nerves or get rid of a hangover (Eye-opener) :   No   CAGE Score :   0    Pastor Ford CMA - 3/17/2020 3:38 PM CDT

## 2022-02-15 NOTE — PROCEDURES
Accession Number:       [id number]  CLINICAL INFORMATION::     None given  LMP::     NONE GIVEN  PREV. PAP::     NONE GIVEN  PREV. BX::     NONE GIVEN  SOURCE::     None given  STATEMENT OF ADEQUACY::     Satisfactory for evaluation. Endocervical/transformation zone component present. Age and/or menstrual status not provided  INTERPRETATION/RESULT::     Negative for intraepithelial lesion or malignancy.  COMMENT::     This Pap test has been evaluated with computer assisted technology.  CYTOTECHNOLOGIST::     ALIDA JONES(ASCP) CT Screening location: [place] [id number]  COMMENT:     See comment       EXPLANATORY NOTE:         The Pap is a screening test for cervical cancer. It is       not a diagnostic test and is subject to false negative       and false positive results. It is most reliable when a       satisfactory sample, regularly obtained, is submitted       with relevant clinical findings and history, and when       the Pap result is evaluated along with historic and       current clinical information.

## 2022-02-15 NOTE — NURSING NOTE
Comprehensive Intake Entered On:  4/13/2021 2:47 PM CDT    Performed On:  4/13/2021 2:34 PM CDT by Elizabeth Jones               Summary   Chief Complaint :   Go over report of physical therapist.   Might need imaging.  soft indentation at where skull and spin meet.     Last Menstrual Period :   4/5/2021 CDT   Menstrual Status :   Menarcheal   Height Measured :   67 in(Converted to: 5 ft 7 in, 170.18 cm)    Height/Length Estimated :   67.5 in(Converted to: 5 ft 7 in, 171.45 cm)    Ht/Wt Measurement Refused by Patient? :   Yes   Systolic Blood Pressure :   154 mmHg (HI)    Diastolic Blood Pressure :   99 mmHg (HI)    Mean Arterial Pressure :   117 mmHg   Peripheral Pulse Rate :   84 bpm   BP Site :   Right arm   BP Method :   Manual   HR Method :   Manual   Temperature Tympanic :   97 DegF(Converted to: 36.1 DegC)  (LOW)    Oxygen Saturation :   99 %   Elizabeth Jones - 4/13/2021 2:34 PM CDT   Health Status   Allergies Verified? :   Yes   Medication History Verified? :   Yes   Medical History Verified? :   No   Pre-Visit Planning Status :   Not completed   Elizabeth Jones - 4/13/2021 2:34 PM CDT   Consents   Consent for Immunization Exchange :   Consent Granted   Consent for Immunizations to Providers :   Consent Granted   Elizabeth Jones - 4/13/2021 2:34 PM CDT   Meds / Allergies   (As Of: 4/13/2021 2:47:49 PM CDT)   Allergies (Active)   codeine  Estimated Onset Date:   Unspecified ; Created By:   Generated Domain User for [id number]; Reaction Status:   Active ; Substance:   codeine ; Updated By:   Generated Domain User for [id number]; Source:   Patient ; Reviewed Date:   4/6/2021 3:28 PM CDT      Depakote  Estimated Onset Date:   Unspecified ; Reactions:   Diarrhea ; Comments:     Comment 1: developed severe diarrhea at high doses   ; Created By:   Cris Gomez MD; Reaction Status:   Active ; Category:   Drug ; Substance:   Depakote ; Type:   Secondary Effect ; Updated By:   Cris Gomez MD; Reviewed Date:   4/6/2021  3:28 PM CDT        Medication List   (As Of: 4/13/2021 2:47:49 PM CDT)   Prescription/Discharge Order    omeprazole  :   omeprazole ; Status:   Prescribed ; Ordered As Mnemonic:   omeprazole 40 mg oral delayed release capsule ; Simple Display Line:   40 mg, 1 cap(s), Oral, daily, 90 cap(s), 3 Refill(s) ; Ordering Provider:   Cris Gomez MD; Catalog Code:   omeprazole ; Order Dt/Tm:   3/13/2020 1:52:47 PM CDT          lamoTRIgine  :   lamoTRIgine ; Status:   Prescribed ; Ordered As Mnemonic:   LaMICtal 100 mg oral tablet ; Simple Display Line:   150 mg, 1.5 tab(s), Oral, daily, 270 tab(s), 3 Refill(s) ; Ordering Provider:   Irwin Eckert MD; Catalog Code:   lamoTRIgine ; Order Dt/Tm:   3/13/2020 1:57:43 PM CDT          clonazePAM  :   clonazePAM ; Status:   Prescribed ; Ordered As Mnemonic:   clonazePAM 1 mg oral tablet ; Simple Display Line:   1 mg, 1 tab(s), Oral, bid, 180 tab(s), 1 Refill(s) ; Ordering Provider:   Cris Gomez MD; Catalog Code:   clonazePAM ; Order Dt/Tm:   9/10/2020 11:24:24 AM CDT            Home Meds    QUEtiapine  :   QUEtiapine ; Status:   Documented ; Ordered As Mnemonic:   QUEtiapine 25 mg oral tablet ; Simple Display Line:   25 mg, 1 tab(s), 0 Refill(s) ; Catalog Code:   QUEtiapine ; Order Dt/Tm:   4/6/2021 3:30:37 PM CDT          loratadine-pseudoephedrine  :   loratadine-pseudoephedrine ; Status:   Documented ; Ordered As Mnemonic:   Claritin-D 12 Hour oral tablet, extended release ; Simple Display Line:   1 tab(s), Oral, q12 hrs, 0 Refill(s) ; Catalog Code:   loratadine-pseudoephedrine ; Order Dt/Tm:   1/31/2020 8:30:45 AM CST            ID Risk Screen   Recent Travel History :   No recent travel   Family Member Travel History :   No recent travel   Other Exposure to Infectious Disease :   Unknown   COVID-19 Testing Status :   No COVID-19 test performed   Elizabeth Jones - 4/13/2021 2:34 PM CDT   Social History   Social History   (As Of: 4/13/2021 2:47:50 PM CDT)    Alcohol:        Never   (Last Updated: 3/13/2019 10:12:21 AM CDT by Erica Gordillo)          Tobacco:        Never (less than 100 in lifetime)   (Last Updated: 1/25/2021 8:21:32 AM CST by Elsy Doss LPN)          Electronic Cigarette/Vaping:        Electronic Cigarette Use: Never.   (Last Updated: 1/25/2021 8:21:36 AM CST by Elsy Doss LPN)          Substance Abuse:        Never   (Last Updated: 3/14/2019 3:36:43 PM CDT by Cori Schwartz)          Employment/School:        Highest education level: University degree(s).   (Last Updated: 3/14/2019 3:37:34 PM CDT by Cori Schwartz)          Home/Environment:        Marital status: .  Living situation: Home/Independent.  Injuries/Abuse/Neglect in household: No.   (Last Updated: 3/14/2019 3:37:54 PM CDT by Cori Schwartz)          Nutrition/Health:        Type of diet: Regular.   (Last Updated: 3/14/2019 3:36:50 PM CDT by Cori Schwartz)          Exercise:        Exercise frequency: Daily.  Exercise type: Walking dogs, elliptical.   (Last Updated: 3/14/2019 3:37:21 PM CDT by Cori Schwartz)          Sexual:        Identifies as female, Sexual orientation: Straight or heterosexual.  History of STD: No.  Uses condoms: No.  Contraceptive Use Details: Birth control pill.  History of sexual abuse: No.   (Last Updated: 3/14/2019 3:38:34 PM CDT by Cori Schwartz)

## 2022-02-15 NOTE — PROGRESS NOTES
Chief Complaint    f/u hospital. Headache, mood.  History of Present Illness      patient present to clinic today for follow up from the hospital. d/c summary shows pt was started on clonazepam, d/c the seroquel and the lexapro, possible serotonin syndrome vs hypomania,      Review of systems is negative except as per HPI including:  no fevers, chills, sore throat, runny nose, nausea, vomiting, constipation, diarrhea, rash or new skin lesions, chest pain, palpitations, slurred speech, new paresthesia, shortness of breath or wheeze.      Exam:      General: alert and oriented ×3 no acute distress.      HEENT: pupils are equal round and reactive to light extraocular motion is intact. Normocephalic and atraumatic.       Hearing is grossly normal and there is no otorrhea.       Nares are patent there is no rhinorrhea.       Mucous membranes are moist and pink.      Chest: has bilateral rise with no increased work of breathing.      Cardiovascular: normal perfusion and brisk capillary refill.      Musculoskeletal: no gross focal abnormalities and normal gait.      Neuro: no gross focal abnormalities and memory seems intact.      Psychiatric: speech is clear and coherent and fluent. Patient dressed appropriately for the weather. Mood is appropriate and affect is full.      Procedure Note:  bilateral Occipital nerve block      Informed consent obtained including risks of pain, bleeding, infection, injury to surrounding tissue and need for  further treatment, benefit hopefully reduce pain, no guarantee made, alternatives, doing nothing, trying oral medications such as NSAIDS, TCAs, gabapentin, using ice, trying PT, yoga, taty chi, referral to another provider.       Location: painful and tender occipital head trigger points overlying greater occipital nerve were identified by palpation with communication from patient             Number of injections: 1 to left and 1 to right             pain prior to procedure:   moderate   to severe             pain following procedure: minimal             number of milliliters of the 1:1 solution of 1% lidocaine without epinephrine and 0.5% Marcaine without epinephrine used in total: 4 per each side                     Discussed with patient to return to clinic if symptoms worsen or do not improve  Physical Exam   Vitals & Measurements    T: 97.7   F (Tympanic)  HR: 89(Peripheral)  BP: 122/80  SpO2: 99%     WT: 198.6 lb   Assessment/Plan       SANTIAGO (generalized anxiety disorder) (F41.1)         Ordered:          divalproex sodium, = 1 tab(s) ( 250 mg ), Oral, daily, # 30 tab(s), 1 Refill(s), Type: Maintenance, Pharmacy: [id number], 1 tab(s) Oral daily, (Ordered)          Return to Clinic (Request), Return in 1 week                Hypomania (F30.8)         Ordered:          Referral (Request), 04/17/19 11:28:00 CDT, Referred to: Psychiatry, Hypomania                Orders:         Return to Clinic (Request), Return in 2 weeks      we will see how she does on depakote, decrease the clonazepam 1mg bid to 0.5 mg bid. 25 minutes spent with patient in direct face to face contact, > 50% of time spent counseling and coordinating care.       tension headache, s/p onb today, improved, continue with HEP and try icing, Fifteen minutes spent with patient in direct face to face contact, in addition to the time spent performing the procedure today, greater than 50% of that time was  spent counseling and coordinating care.   Patient Information     Name:ELI ECHOLS      Address:      [address] [address]     Sex:Female     YOB: 1970     [phone]     MRN:[id number]     FIN:[id number]     Location:[facility]     Date of Service:04/17/2019      Primary Care Physician:       NONE ,       Attending Physician:       Cris Gomez MD, [phone]  Problem List/Past Medical History    Ongoing     Chronic tension headache     SANTIAGO  (generalized anxiety disorder)     Hypomania     Migraine headache     Obesity    Historical     Inpatient stay       Comments: @[facility] [place], [place] - Hypomania - Bipolar 2 versus cyclothymia     Pregnancy     Pregnancy     Pregnancy     Pregnancy     Pregnancy     Pregnancy  Procedure/Surgical History      delivery NOS, unspecified (2011)           Other Postsurgical Status (2011)            section           Tonsillectomy        Medications     QUEtiapine 25 mg oral tablet: 1/2-1 tab(s), Oral, qhs, 30 EA, 1 Refill(s).     Desogen 0.15 mg-0.03 mg oral tablet: 1 tab(s), PO, Daily, take 21 days of active tablets then skip placebos and start the next pack, 90 tab(s), 3 Refill(s).     QUEtiapine 25 mg oral tablet: 1/2-1.5 tab(s), Oral, qhs, 45 EA, 1 Refill(s).     Lexapro 10 mg oral tablet: 10 mg, 1 tab(s), PO, Daily, 30 tab(s), 1 Refill(s).     LORazepam 1 mg oral tablet: 1 mg, 1 tab(s), Oral, tid, PRN: anxiety, 14 tab(s), 0 Refill(s).     clonazePAM 1 mg oral tablet: 1 mg, 1 tab(s), Oral, bid, 0 Refill(s).     Depakote  mg oral tablet, extended release: 250 mg, 1 tab(s), Oral, daily, 30 tab(s), 1 Refill(s).          Allergies    codeine  Social History    Smoking Status - 2019     Never smoker     Alcohol      Never, 2019     Employment and Education      Highest education level: University degree(s)., 2019     Exercise and Physical Activity      Exercise frequency: Daily. Exercise type: Walking dogs, elliptical., 2019     Home and Environment      Marital status: . Living situation: Home/Independent. Injuries/Abuse/Neglect in household: No., 2019     Nutrition and Health      Type of diet: Regular., 2019     Sexual      Identifies as female, Sexual orientation: Straight or heterosexual. History of STD: No. Uses condoms: No. Contraceptive Use Details: Birth control pill. History of sexual abuse: No., 2019     Substance  Abuse      Never, 03/14/2019     Tobacco      Never (less than 100 in lifetime), 03/14/2019  Family History    Cancer of adrenal gland: Father.    Heart disease: Father.    Hypercholesterolemia: Father.    Lung disorder: Father.  Immunizations      Vaccine Date Status      influenza 01/13/2018 Recorded      Td 06/13/2015 Recorded      meningococcal conjugate vaccine 08/22/2007 Recorded      Hep B 08/22/2007 Recorded      MMR (measles/mumps/rubella) 08/22/2007 Recorded      Hep B 06/22/2007 Recorded  Lab Results       Lab Results (Last 4 results within 90 days)        Sodium Level: 137 mmol/L [135 mmol/L - 146 mmol/L] (03/04/19 08:33:00)       Potassium Level: 4 mmol/L [3.5 mmol/L - 5.3 mmol/L] (03/04/19 08:33:00)       Chloride Level: 103 mmol/L [98 mmol/L - 110 mmol/L] (03/04/19 08:33:00)       CO2 Level: 23 mmol/L [20 mmol/L - 32 mmol/L] (03/04/19 08:33:00)       Glucose Level: 85 mg/dL [65 mg/dL - 99 mg/dL] (03/04/19 08:33:00)       BUN: 18 mg/dL [7 mg/dL - 25 mg/dL] (03/04/19 08:33:00)       Creatinine Level: 0.84 mg/dL [0.5 mg/dL - 1.1 mg/dL] (03/04/19 08:33:00)       BUN/Creat Ratio: NOT APPLICABLE [6  - 22] (03/04/19 08:33:00)       eGFR: 83 mL/min/1.73m2 (03/04/19 08:33:00)       eGFR : 96 mL/min/1.73m2 (03/04/19 08:33:00)       Calcium Level: 9.4 mg/dL [8.6 mg/dL - 10.2 mg/dL] (03/04/19 08:33:00)       Hgb A1c: 5.2 (03/04/19 08:33:00)       Cholesterol: 181 mg/dL (03/04/19 08:33:00)       Non-HDL Cholesterol: 103 (03/04/19 08:33:00)       HDL: 78 mg/dL (03/04/19 08:33:00)       Cholesterol/HDL Ratio: 2.3 (03/04/19 08:33:00)       LDL: 84 (03/04/19 08:33:00)       Triglyceride: 101 mg/dL (03/04/19 08:33:00)       TSH: 1.39 mIU/L (03/04/19 08:33:00)       HPV High Risk: NOT DETECTED (03/12/19 12:49:00)

## 2022-02-15 NOTE — PROGRESS NOTES
Chief Complaint    f/u migraine, co-insiding with third day of period  History of Present Illness      patient present to clinic today for follow up headaches      She has been seen by Dr. Isacc Alegria who has helped her get a new pair of glasses.  She just got them yesterday so she is not sure how much this will help with her frontal headaches that are most likely related to some eye strain.  At our last visit we did some occipital nerve blocks and she thinks that this is helped her headaches be less severe.  She had great pain relief for about 3 weeks and reports that when she got her.  This time she still developed tension headache that then evolved into a migraine however it was not as severe as it had been prior to receiving the occipital nerve block.  She also reports that she had previously seen an associate provider and they had discussed her dry mouth.  Chart review shows that there is a return to clinic order for her to have a Sjogren's panel done.  She would like to get this drawn today while she is here.  Order was placed.  She has found that applying ice to her frontal head and to her occipital head helps when she has the headaches but only while the ice is in place.  She has started physical therapy also.       She thinks that the duloxetine has helped with some of her myalgias and is tolerating it well without any adverse side effects.  She would like to continue with this.        Review of systems 12 point review of systems negative except as per HPI        Exam:        General: alert and oriented ×3 no acute distress.        HEENT: pupils are equal round and reactive to light extraocular motion is intact. Normocephalic and atraumatic.         Hearing is grossly normal and there is no otorrhea. TM pearly grey with normal likght reflex        Nares are patent there is no rhinorrhea.         Mucous membranes are moist and pink.        Chest: has bilateral rise with no increased work of breathing.  ctab  no wheezes,  rhonchi or rales        Cardiovascular: normal perfusion and brisk capillary refill.  nml s1s2, no m/g/r        Musculoskeletal: no gross focal abnormalities and normal gait.  Her upper traps and cervical paraspinal muscles are tight.        Neuro: no gross focal abnormalities and memory seems intact.        Psychiatric: speech is clear and coherent and fluent. Patient dressed appropriately for the weather. Mood is appropriate and affect is full.                           Discussed with patient to return to clinic if symptoms worsen or do not improve  Physical Exam   Vitals & Measurements    T: 98.6   F (Tympanic)  HR: 90(Peripheral)  BP: 122/80  SpO2: 97%     WT: 191.6 lb   Assessment/Plan       Chronic tension headache (G44.229)         Ordered:          Return to Clinic (Request), RFV: follow up headache and do annual, Return in 3 months                Dry mouth (R68.2)         Ordered:          YUMIKO Screen,IFA,with Reflex to Titer and Pattern/Sjogren's Panel 1* ([facility]), Specimen Type: Serum, Collection Date: 01/14/19 11:53:00 CST                Migraine headache (G43.909)        We discussed that if somebody is having classic migraines then starting an oral contraceptive pill can be a relative contraindication due to concerns of vasospasm possibly increasing risk of stroke however given that her migraines seem to directly correlate to her menstrual cycle it may be worthwhile to try to control her cycle to eliminate the symptoms.  She would like to give this a try because her symptoms were so severe with her last menstrual cycle.  She is midcycle now and can certainly start taking her contraceptive pill at this time however it may be too late to prevent ovulation for this cycle.  Explained that she should give it 3 months to see how well it is working and then we should follow-up.  She can certainly return to clinic sooner if needed.         Ordered:          Return to Clinic (Request), RFV: follow up  headache and do annual, Return in 3 months                Orders:         desogestrel-ethinyl estradiol, 1 tab(s), PO, Daily, Instructions: take 21 days of active tablets then skip placebos and start the next pack, # 112 tab(s), 3 Refill(s), Type: Maintenance, Pharmacy: [id number], 1 tab(s) Oral daily,Instr:take 21 days of active tablets then..., (Ordered)         DULoxetine, = 1 cap(s) ( 30 mg ), Oral, bid, # 60 EA, 1 Refill(s), Type: Hard Stop, Pharmacy: [facility], (Completed)         DULoxetine, = 1 cap(s) ( 60 mg ), Oral, daily, # 90 cap(s), 1 Refill(s), Type: Maintenance, Pharmacy: [id number], 1 cap(s) Oral daily, (Ordered)         LORazepam, = 1 tab(s) ( 1 mg ), Oral, once, # 1 tab(s), 0 Refill(s), Type: Soft Stop, Pharmacy: [facility], 1 tab(s) Oral once, (Completed)         Return to Clinic (Request), RFV: 1 week before annual in 3 months lab only FLP and BMP and HgBA1c, and TSH         Return to Clinic (Request), RFV: 2-3 weeks follow up headaches      15 minutes spent with patient in direct face to face contact, > 50% of time spent counseling and coordinating care.   Patient Information     Name:ELI ECHOLS      Address:      [address] [address]     Sex:Female     YOB: 1970     [phone]     MRN:[id number]     FIN:[id number]     Location:[facility]     Date of Service:2019      Primary Care Physician:       NONE ,       Attending Physician:       Cris Gomez MD, [phone]  Problem List/Past Medical History    Ongoing     Chronic tension headache     Migraine headache     Obesity    Historical     Pregnancy  Procedure/Surgical History      delivery NOS, unspecified (2011)           Other Postsurgical Status (2011)        Medications     Imitrex 25 mg oral tablet: See Instructions, 1 tab(s) PO Once  may repeat dose in 2 hours if needed, PRN: for migraine headache, 9  tab(s), 0 Refill(s).     Zofran 4 mg oral tablet: 4 mg, 1 tab(s), PO, q8 hrs, 10 tab(s), 0 Refill(s).     Desogen 0.15 mg-0.03 mg oral tablet: 1 tab(s), PO, Daily, take 21 days of active tablets then skip placebos and start the next pack, 112 tab(s), 3 Refill(s).     DULoxetine 60 mg oral delayed release capsule: 60 mg, 1 cap(s), Oral, daily, 90 cap(s), 1 Refill(s).          Allergies    codeine  Social History    Smoking Status - 01/14/2019     Never smoker     Alcohol      Never, 12/26/2018

## 2022-02-15 NOTE — NURSING NOTE
Comprehensive Intake Entered On:  4/21/2021 8:18 AM CDT    Performed On:  4/21/2021 8:06 AM CDT by Elizabeth Jones               Summary   Chief Complaint :   Follow up on CT scan  Continue PT?  Shot for right neck pain? Getting stiffer over the last few weeks. 199.8   Menstrual Status :   Menarcheal   Weight Measured :   199.8 lb(Converted to: 199 lb 13 oz, 90.628 kg)    Height Measured :   67 in(Converted to: 5 ft 7 in, 170.18 cm)    Body Mass Index :   31.29 kg/m2 (HI)    Body Surface Area :   2.07 m2   Height/Length Estimated :   67.5 in(Converted to: 5 ft 7 in, 171.45 cm)    Systolic Blood Pressure :   142 mmHg (HI)    Diastolic Blood Pressure :   78 mmHg   Mean Arterial Pressure :   99 mmHg   Peripheral Pulse Rate :   66 bpm   BP Site :   Right arm   Pulse Site :   Radial artery   BP Method :   Manual   HR Method :   Manual   Temperature Tympanic :   97.3 DegF(Converted to: 36.3 DegC)  (LOW)    Oxygen Saturation :   98 %   Elizabeth Jones - 4/21/2021 8:06 AM CDT   Health Status   Allergies Verified? :   Yes   Medication History Verified? :   Yes   Medical History Verified? :   No   Pre-Visit Planning Status :   Completed   Tobacco Use? :   Never smoker   Elizabeth Jones - 4/21/2021 8:06 AM CDT   Consents   Consent for Immunization Exchange :   Consent Granted   Consent for Immunizations to Providers :   Consent Granted   Elizabeth Jones - 4/21/2021 8:06 AM CDT   Meds / Allergies   (As Of: 4/21/2021 8:18:27 AM CDT)   Allergies (Active)   codeine  Estimated Onset Date:   Unspecified ; Created By:   [employer] Domain User for [id number]; Reaction Status:   Active ; Substance:   codeine ; Updated By:   Generated Domain User for [id number]; Source:   Patient ; Reviewed Date:   4/21/2021 8:12 AM CDT      Depakote  Estimated Onset Date:   Unspecified ; Reactions:   Diarrhea ; Comments:     Comment 1: developed severe diarrhea at high doses   ; Created By:   Cris Gomez MD; Reaction Status:   Active ; Category:   Drug ;  Substance:   Depakote ; Type:   Secondary Effect ; Updated By:   Cris Gomez MD; Reviewed Date:   4/21/2021 8:12 AM CDT        Medication List   (As Of: 4/21/2021 8:18:27 AM CDT)   Prescription/Discharge Order    omeprazole  :   omeprazole ; Status:   Prescribed ; Ordered As Mnemonic:   omeprazole 40 mg oral delayed release capsule ; Simple Display Line:   40 mg, 1 cap(s), Oral, daily, 90 cap(s), 3 Refill(s) ; Ordering Provider:   Cris Gomez MD; Catalog Code:   omeprazole ; Order Dt/Tm:   3/13/2020 1:52:47 PM CDT          lamoTRIgine  :   lamoTRIgine ; Status:   Prescribed ; Ordered As Mnemonic:   LaMICtal 100 mg oral tablet ; Simple Display Line:   200 mg, 2 tab(s), Oral, daily, 270 tab(s), 3 Refill(s) ; Ordering Provider:   Irwin Eckert MD; Catalog Code:   lamoTRIgine ; Order Dt/Tm:   3/13/2020 1:57:43 PM CDT          clonazePAM  :   clonazePAM ; Status:   Prescribed ; Ordered As Mnemonic:   clonazePAM 1 mg oral tablet ; Simple Display Line:   1 mg, 1 tab(s), Oral, bid, 180 tab(s), 1 Refill(s) ; Ordering Provider:   Cris Gomez MD; Catalog Code:   clonazePAM ; Order Dt/Tm:   9/10/2020 11:24:24 AM CDT            Home Meds    QUEtiapine  :   QUEtiapine ; Status:   Documented ; Ordered As Mnemonic:   QUEtiapine 25 mg oral tablet ; Simple Display Line:   25 mg, 1 tab(s), 0 Refill(s) ; Catalog Code:   QUEtiapine ; Order Dt/Tm:   4/6/2021 3:30:37 PM CDT          loratadine-pseudoephedrine  :   loratadine-pseudoephedrine ; Status:   Documented ; Ordered As Mnemonic:   Claritin-D 12 Hour oral tablet, extended release ; Simple Display Line:   1 tab(s), Oral, q12 hrs, PRN: allergy symptoms, 0 Refill(s) ; Catalog Code:   loratadine-pseudoephedrine ; Order Dt/Tm:   1/31/2020 8:30:45 AM CST            ID Risk Screen   Recent Travel History :   No recent travel   Family Member Travel History :   No recent travel   Other Exposure to Infectious Disease :   Unknown   COVID-19 Testing Status :   No COVID-19  test performed   Elizabeth Jones - 4/21/2021 8:06 AM CDT   Social History   Social History   (As Of: 4/21/2021 8:18:27 AM CDT)   Alcohol:        Never   (Last Updated: 3/13/2019 10:12:21 AM CDT by Erica Gordillo)          Tobacco:        Never (less than 100 in lifetime)   (Last Updated: 1/25/2021 8:21:32 AM CST by Elsy Doss LPN)          Electronic Cigarette/Vaping:        Electronic Cigarette Use: Never.   (Last Updated: 1/25/2021 8:21:36 AM CST by Elsy Doss LPN)          Substance Abuse:        Never   (Last Updated: 3/14/2019 3:36:43 PM CDT by Cori Schwartz)          Employment/School:        Highest education level: University degree(s).   (Last Updated: 3/14/2019 3:37:34 PM CDT by Cori Schwartz)          Home/Environment:        Marital status: .  Living situation: Home/Independent.  Injuries/Abuse/Neglect in household: No.   (Last Updated: 3/14/2019 3:37:54 PM CDT by Cori Schwartz)          Nutrition/Health:        Type of diet: Regular.   (Last Updated: 3/14/2019 3:36:50 PM CDT by Cori Schwartz)          Exercise:        Exercise frequency: Daily.  Exercise type: Walking dogs, elliptical.   (Last Updated: 3/14/2019 3:37:21 PM CDT by Cori Schwartz)          Sexual:        Identifies as female, Sexual orientation: Straight or heterosexual.  History of STD: No.  Uses condoms: No.  Contraceptive Use Details: Birth control pill.  History of sexual abuse: No.   (Last Updated: 3/14/2019 3:38:34 PM CDT by Cori Schwartz)

## 2022-02-15 NOTE — NURSING NOTE
Comprehensive Intake Entered On:  3/13/2020 1:30 PM CDT    Performed On:  3/13/2020 1:24 PM CDT by Violeta Arizmendi CMA               Summary   Chief Complaint :   medication management   Menstrual Status :   Menarcheal   Weight Measured :   198.8 lb(Converted to: 198 lb 13 oz, 90.17 kg)    Height Measured :   67.5 in(Converted to: 5 ft 7 in, 171.45 cm)    Body Mass Index :   30.67 kg/m2 (HI)    Body Surface Area :   2.07 m2   Systolic Blood Pressure :   120 mmHg   Diastolic Blood Pressure :   68 mmHg   Mean Arterial Pressure :   85 mmHg   Peripheral Pulse Rate :   80 bpm   BP Site :   Right arm   Pulse Site :   Radial artery   BP Method :   Manual   HR Method :   Manual   Violeta Arizmendi CMA - 3/13/2020 1:24 PM CDT   Health Status   Allergies Verified? :   Yes   Medication History Verified? :   Yes   Medical History Verified? :   No   Pre-Visit Planning Status :   Completed   Tobacco Use? :   Never smoker   Violeta Arizmendi CMA - 3/13/2020 1:24 PM CDT   Consents   Consent for Immunization Exchange :   Consent Granted   Consent for Immunizations to Providers :   Consent Granted   Violeta Arizmendi CMA - 3/13/2020 1:24 PM CDT   Meds / Allergies   (As Of: 3/13/2020 1:30:29 PM CDT)   Allergies (Active)   codeine  Estimated Onset Date:   Unspecified ; Created By:   Generated Domain User for [id number]; Reaction Status:   Active ; Substance:   codeine ; Updated By:   Generated Domain User for [id number]; Source:   Patient ; Reviewed Date:   3/13/2020 1:28 PM CDT      Depakote  Estimated Onset Date:   Unspecified ; Reactions:   Diarrhea ; Comments:     Comment 1: developed severe diarrhea at high doses   ; Created By:   Cris Gomez MD; Reaction Status:   Active ; Category:   Drug ; Substance:   Depakote ; Type:   Secondary Effect ; Updated By:   Cris Gomez MD; Reviewed Date:   3/13/2020 1:28 PM CDT        Medication List   (As Of: 3/13/2020 1:30:29 PM CDT)   Prescription/Discharge Order    lamoTRIgine  :   lamoTRIgine ;  Status:   Prescribed ; Ordered As Mnemonic:   LaMICtal 100 mg oral tablet ; Simple Display Line:   300 mg, 3 tab(s), Oral, daily, 21 tab(s), 0 Refill(s) ; Ordering Provider:   Cris Gomez MD; Catalog Code:   lamoTRIgine ; Order Dt/Tm:   8/1/2019 9:10:01 PM CDT          omeprazole  :   omeprazole ; Status:   Prescribed ; Ordered As Mnemonic:   omeprazole 40 mg oral delayed release capsule ; Simple Display Line:   40 mg, 1 cap(s), Oral, daily, 90 cap(s), 0 Refill(s) ; Ordering Provider:   Cris Gomez MD; Catalog Code:   omeprazole ; Order Dt/Tm:   1/31/2020 9:17:56 AM CST          QUEtiapine  :   QUEtiapine ; Status:   Prescribed ; Ordered As Mnemonic:   QUEtiapine 25 mg oral tablet ; Simple Display Line:   See Instructions, 1 PO qhs x 1 week then may increase to 2 po qhs, 60 EA, 1 Refill(s) ; Ordering Provider:   Cris Gomez MD; Catalog Code:   QUEtiapine ; Order Dt/Tm:   1/31/2020 9:18:58 AM CST          QUEtiapine  :   QUEtiapine ; Status:   Prescribed ; Ordered As Mnemonic:   QUEtiapine 50 mg oral tablet, extended release ; Simple Display Line:   See Instructions, 1 tab(s) Oral qpm x 1 day, then 2 po Qpm x 1 day, then 3 po qhs, 90 EA, 1 Refill(s) ; Ordering Provider:   Cris Gomez MD; Catalog Code:   QUEtiapine ; Order Dt/Tm:   4/22/2019 12:22:27 PM CDT            Home Meds    clonazePAM  :   clonazePAM ; Status:   Documented ; Ordered As Mnemonic:   clonazePAM 1 mg oral tablet ; Simple Display Line:   1 mg, 1 tab(s), Oral, bid, 0 Refill(s) ; Catalog Code:   clonazePAM ; Order Dt/Tm:   4/17/2019 10:32:48 AM CDT          loratadine-pseudoephedrine  :   loratadine-pseudoephedrine ; Status:   Documented ; Ordered As Mnemonic:   Claritin-D 12 Hour oral tablet, extended release ; Simple Display Line:   1 tab(s), Oral, q12 hrs, 0 Refill(s) ; Catalog Code:   loratadine-pseudoephedrine ; Order Dt/Tm:   1/31/2020 8:30:45 AM CST

## 2022-02-15 NOTE — LETTER
(Inserted Image. Unable to display)       [address] [place] [address]  September 02, 2021      [place] [address]        Dear ELI,    Thank you for selecting [facility] for your healthcare needs.  Below you will find the results of your recent tests done at our clinic.     Iron stores (ferritin) and anemia impoved. Continue iron.      Result Name Current Result Previous Result Reference Range   Ferritin (ng/mL)  31 8/31/2021 ((L)) 4 6/25/2021 16 - 232   Hgb (gm/dL)  12.1 8/31/2021 ((L)) 9.5 6/25/2021 11.7 - 15.5       Please contact me or my assistant at [phone] if you have any questions.     Sincerely,        Дмитрий Umaña MD
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
## 2022-02-15 NOTE — NURSING NOTE
Comprehensive Intake Entered On:  8/1/2019 2:56 PM CDT    Performed On:  8/1/2019 2:49 PM CDT by Gia Hunt CMA               Summary   Chief Complaint :   med check - needs to discuss. Julio Cesar appt on 8/15.   Menstrual Status :   Menarcheal   Weight Measured :   207 lb(Converted to: 207 lb 0 oz, 93.89 kg)    Systolic Blood Pressure :   124 mmHg   Diastolic Blood Pressure :   76 mmHg   Mean Arterial Pressure :   92 mmHg   Peripheral Pulse Rate :   88 bpm   BP Site :   Right arm   Pulse Site :   Radial artery   BP Method :   Manual   HR Method :   Manual   Gia Hunt CMA - 8/1/2019 2:49 PM CDT   Health Status   Allergies Verified? :   Yes   Medication History Verified? :   Yes   Pre-Visit Planning Status :   Completed   Gia Hunt CMA - 8/1/2019 2:49 PM CDT   Meds / Allergies   (As Of: 8/1/2019 2:56:06 PM CDT)   Allergies (Active)   codeine  Estimated Onset Date:   Unspecified ; Created By:   [employer] User for [id number]; Reaction Status:   Active ; Substance:   codeine ; Updated By:   [employer] Domain User for [id number]; Source:   Patient ; Reviewed Date:   5/7/2019 11:01 AM CDT        Medication List   (As Of: 8/1/2019 2:56:06 PM CDT)   Prescription/Discharge Order    divalproex sodium  :   divalproex sodium ; Status:   Completed ; Ordered As Mnemonic:   Depakote  mg oral tablet, extended release ; Simple Display Line:   250 mg, 1 tab(s), Oral, daily, 30 tab(s), 1 Refill(s) ; Ordering Provider:   Cris Gomez MD; Catalog Code:   divalproex sodium ; Order Dt/Tm:   4/17/2019 11:20:09 AM          QUEtiapine  :   QUEtiapine ; Status:   Prescribed ; Ordered As Mnemonic:   QUEtiapine 50 mg oral tablet, extended release ; Simple Display Line:   See Instructions, 1 tab(s) Oral qpm x 1 day, then 2 po Qpm x 1 day, then 3 po qhs, 90 EA, 1 Refill(s) ; Ordering Provider:   Cris Gomez MD; Catalog Code:   QUEtiapine ; Order Dt/Tm:   4/22/2019 12:22:27 PM            Home Meds    clonazePAM  :    clonazePAM ; Status:   Documented ; Ordered As Mnemonic:   clonazePAM 1 mg oral tablet ; Simple Display Line:   1 mg, 1 tab(s), Oral, bid, 0 Refill(s) ; Catalog Code:   clonazePAM ; Order Dt/Tm:   4/17/2019 10:32:48 AM          divalproex sodium  :   divalproex sodium ; Status:   Documented ; Ordered As Mnemonic:   divalproex sodium 500 mg oral delayed release tablet ; Simple Display Line:   500 mg, 1 tab(s), Oral, daily, 0 Refill(s) ; Catalog Code:   divalproex sodium ; Order Dt/Tm:   8/1/2019 2:54:06 PM          lamoTRIgine  :   lamoTRIgine ; Status:   Documented ; Ordered As Mnemonic:   LaMICtal 25 mg oral tablet ; Simple Display Line:   200 mg, 8 tab(s), Oral, daily, Dr. Harrell, 0 Refill(s) ; Catalog Code:   lamoTRIgine ; Order Dt/Tm:   8/1/2019 2:51:46 PM

## 2022-02-15 NOTE — LETTER
(Inserted Image. Unable to display)       [address] [place] [address]  June 30, 2021      EL[place] [address]        Dear ELI,    Thank you for selecting [facility] for your healthcare needs.  Below you will find the results of your recent tests done at our clinic.     You are anemic. Sed rate which measures inflammation is normal.Please schedule an appointment.      Result Name Current Result Reference Range   Sodium Level (mmol/L)  137 6/25/2021 135 - 146   Potassium Level (mmol/L)  4.2 6/25/2021 3.5 - 5.3   Chloride Level (mmol/L)  107 6/25/2021 98 - 110   CO2 Level (mmol/L)  23 6/25/2021 20 - 32   Glucose Level (mg/dL)  84 6/25/2021 65 - 99   BUN (mg/dL)  14 6/25/2021 7 - 25   Creatinine Level (mg/dL)  0.85 6/25/2021 0.50 - 1.10   eGFR (mL/min/1.73m2)  80 6/25/2021 > OR = 60 -    Calcium Level (mg/dL)  9.2 6/25/2021 8.6 - 10.2   WBC  7.0 6/25/2021 3.8 - 10.8   Hgb (gm/dL) ((L)) 9.5 6/25/2021 11.7 - 15.5   Hct (%) ((L)) 30.4 6/25/2021 35.0 - 45.0   Platelet  339 6/25/2021 140 - 400   Sed Rate  6 6/25/2021  - < OR = 20       Please contact me or my assistant at [phone] if you have any questions.     Sincerely,        Дмитрий Umaña MD

## 2022-02-15 NOTE — NURSING NOTE
Comprehensive Intake Entered On:  10/19/2021 10:38 AM CDT    Performed On:  10/19/2021 10:37 AM CDT by Christopehr Pelaez               Summary   Chief Complaint :   f/up.    Menstrual Status :   Menarcheal   Weight Measured :   203.5 lb(Converted to: 203 lb 8 oz, 92.306 kg)    Height Measured :   67 in(Converted to: 5 ft 7 in, 170.18 cm)    Body Mass Index :   31.87 kg/m2 (HI)    Body Surface Area :   2.09 m2   Height/Length Estimated :   67.5 in(Converted to: 5 ft 7 in, 171.45 cm)    Systolic Blood Pressure :   126 mmHg   Diastolic Blood Pressure :   82 mmHg (HI)    Mean Arterial Pressure :   97 mmHg   Peripheral Pulse Rate :   72 bpm   BP Site :   Right arm   BP Method :   Manual   HR Method :   Electronic   Oxygen Saturation :   98 %   Christopher Pelaez - 10/19/2021 10:37 AM CDT   Health Status   Allergies Verified? :   Yes   Medication History Verified? :   Yes   Medical History Verified? :   Yes   Pre-Visit Planning Status :   Completed   Christopher Pelaez - 10/19/2021 10:37 AM CDT   Consents   Consent for Immunization Exchange :   Consent Granted   Consent for Immunizations to Providers :   Consent Granted   Christopher Pelaez - 10/19/2021 10:37 AM CDT   Meds / Allergies   (As Of: 10/19/2021 10:38:37 AM CDT)   Allergies (Active)   codeine  Estimated Onset Date:   Unspecified ; Created By:   Generated Domain User for [id number]; Reaction Status:   Active ; Substance:   codeine ; Updated By:   [employer] Domain User for [id number]; Source:   Patient ; Reviewed Date:   10/19/2021 10:38 AM CDT      Depakote  Estimated Onset Date:   Unspecified ; Reactions:   Diarrhea ; Comments:     Comment 1: developed severe diarrhea at high doses   ; Created By:   Cris Gomez MD; Reaction Status:   Active ; Category:   Drug ; Substance:   Depakote ; Type:   Secondary Effect ; Updated By:   Cris Gomez MD; Reviewed Date:   10/19/2021 10:38 AM CDT        Medication List   (As Of: 10/19/2021  10:38:37 AM CDT)   Prescription/Discharge Order    clonazePAM  :   clonazePAM ; Status:   Prescribed ; Ordered As Mnemonic:   clonazePAM 1 mg oral tablet ; Simple Display Line:   1 mg, 1 tab(s), Oral, bid, 180 tab(s), 1 Refill(s) ; Ordering Provider:   Cris Gomez MD; Catalog Code:   clonazePAM ; Order Dt/Tm:   9/10/2020 11:24:24 AM CDT          lamoTRIgine  :   lamoTRIgine ; Status:   Prescribed ; Ordered As Mnemonic:   LaMICtal 100 mg oral tablet ; Simple Display Line:   200 mg, 2 tab(s), Oral, daily, 270 tab(s), 3 Refill(s) ; Ordering Provider:   Irwin Eckert MD; Catalog Code:   lamoTRIgine ; Order Dt/Tm:   3/13/2020 1:57:43 PM CDT            Home Meds    ferrous sulfate  :   ferrous sulfate ; Status:   Documented ; Ordered As Mnemonic:   ferrous sulfate 325 mg (65 mg elemental iron) oral delayed release tablet ; Simple Display Line:   325 mg, 1 tab(s), Oral, daily, 0 Refill(s) ; Catalog Code:   ferrous sulfate ; Order Dt/Tm:   7/5/2021 2:55:34 PM CDT          QUEtiapine  :   QUEtiapine ; Status:   Documented ; Ordered As Mnemonic:   QUEtiapine 25 mg oral tablet ; Simple Display Line:   25 mg, 1 tab(s), 0 Refill(s) ; Catalog Code:   QUEtiapine ; Order Dt/Tm:   4/6/2021 3:30:37 PM CDT          loratadine-pseudoephedrine  :   loratadine-pseudoephedrine ; Status:   Documented ; Ordered As Mnemonic:   Claritin-D 12 Hour oral tablet, extended release ; Simple Display Line:   1 tab(s), Oral, q12 hrs, PRN: allergy symptoms, 0 Refill(s) ; Catalog Code:   loratadine-pseudoephedrine ; Order Dt/Tm:   1/31/2020 8:30:45 AM CST

## 2022-02-15 NOTE — PROGRESS NOTES
Chief Complaint    Go over report of physical therapist.   Might need imaging.  soft indentation at where skull and spin meet.  History of Present Illness       Najma is here for follow-up on her ongoing issues with cervical strain.  She has been going to physical therapy and making some progress.  Therapy has been doing some cervical traction with some improvement in her pain immediately afterwards.  She reports she is quite sore the next day.  She continues have intermittent headache, complaining of headache today.  She reports intermittent left arm numbness, tongue numbness, dizziness, difficulty with concentration.  She was recently seen by her psychiatrist with some adjustments in her medications.  Review of Systems       See HPI.  All other review of systems negative.  Physical Exam   Vitals & Measurements    T: 97  F (Tympanic)  HR: 84 (Peripheral)  BP: 154/99  SpO2: 99%     HT: 67 in  HT: 67.5 in        Alert, oriented, no acute distress       Normal extraocular movements       normal cranial nerves II through XII       Neck has normal range of motion, mild posterior cervical tenderness, mild occipital tenderness       Upper extremity exam reveals normal sensation, normal strength, normal range of motion  Assessment/Plan       1. Headache (R51.9)          Headache syndrome exacerbated by cervical strain         She will continue with pain management as needed.         Ordered:          CT Head w/o Contrast (Request), Headache  Concussion syndrome          Referral (Request), 04/13/21 15:43:00 CDT, Referred to: Radiology, Reason for referral: CT head, Concussion syndrome  Headache          Review Orders for Potential Authorizations, 04/13/21 15:43:18 CDT                2. Concussion syndrome (F07.81)          Her lack of concentration and increasing headache may certainly be part of a postconcussion syndrome although a bit unlikely given injury was several months ago.         CT of the head has been  ordered and will follow up when results are available.         Ordered:          CT Head w/o Contrast (Request), Headache  Concussion syndrome          Referral (Request), 21 15:43:00 CDT, Referred to: Radiology, Reason for referral: CT head, Concussion syndrome  Headache          Review Orders for Potential Authorizations, 21 15:43:18 CDT                Orders:         lamoTRIgine, = 2 tab(s) ( 200 mg ), Oral, daily, # 270 tab(s), 3 Refill(s), Type: Maintenance, Pharmacy: [id number], (Ordered)  Patient Information     Name:ELI ECHOLS      Address:      [address] [id number]     Sex:Female     YOB: 1970     [phone]     Emergency Contact:IFTIKHAR ECHOLS     MRN:[id number]     FIN:[id number]     Location:[facility]     Date of Service:2021      Primary Care Physician:       Cris Gomez MD, [phone]      Attending Physician:       Irwin Eckert MD, [phone]  Problem List/Past Medical History    Ongoing     Bipolar 1 disorder     Chronic tension headache     SANTIAGO (generalized anxiety disorder)     History of eye surgery     Hypomania     Migraine headache     Obesity     Oscillopsia    Historical     Inpatient stay       Comments: @[place], [place] - Hypomania - Bipolar 2 versus cyclothymia     Pregnancy     Pregnancy     Pregnancy     Pregnancy     Pregnancy     Pregnancy  Procedure/Surgical History      delivery NOS, unspecified (2011)     Other Postsurgical Status (2011)      section     Tonsillectomy  Medications    Claritin-D 12 Hour oral tablet, extended release, 1 tab(s), Oral, q12 hrs, PRN    clonazePAM 1 mg oral tablet, 1 mg= 1 tab(s), Oral, bid, 1 refills    LaMICtal 100 mg oral tablet, 200 mg= 2 tab(s), Oral, daily, 3 refills    omeprazole 40 mg oral delayed release capsule, 40 mg= 1 cap(s), Oral, daily, 3 refills    QUEtiapine 25 mg oral tablet, 25 mg= 1  tab(s)  Allergies    Depakote (Diarrhea)    codeine  Social History    Smoking Status     Never smoker     Alcohol      Never     Electronic Cigarette/Vaping      Electronic Cigarette Use: Never.     Employment/School      Highest education level: University degree(s).     Exercise      Exercise frequency: Daily. Exercise type: Walking dogs, elliptical.     Home/Environment      Marital status: . Living situation: Home/Independent. Injuries/Abuse/Neglect in household: No.     Nutrition/Health      Type of diet: Regular.     Sexual      Identifies as female, Sexual orientation: Straight or heterosexual. History of STD: No. Uses condoms: No. Contraceptive Use Details: Birth control pill. History of sexual abuse: No.     Substance Abuse      Never     Tobacco      Never (less than 100 in lifetime)  Family History    Cancer of adrenal gland: Father.    Heart disease: Father.    Hypercholesterolemia: Father.    Lung disorder: Father.  Immunizations      Vaccine Date Status          influenza virus vaccine, inactivated 09/21/2020 Recorded          influenza virus vaccine, inactivated 10/28/2019 Given          influenza 01/13/2018 Recorded          Td 06/13/2015 Recorded          influenza, H1N1, live 12/09/2009 Recorded          MMR (measles/mumps/rubella) 08/22/2007 Recorded          meningococcal conjugate vaccine 08/22/2007 Recorded          Hep B 08/22/2007 Recorded          Hep B 06/22/2007 Recorded          MMR (measles/mumps/rubella) 06/22/2007 Recorded          Td 04/12/2001 Recorded

## 2022-02-15 NOTE — PROGRESS NOTES
Chief Complaint    Med check- quetiapine  History of Present Illness      patient present to clinic today for follow up mood       charlie 7= 9 and Phq 9=4      was getting tremors so had to increase quetiapine to 37.5 mg      concern duoloxetine may be contributing to the anxiety      Review of systems is negative except as per HPI including:  no fevers, chills, sore throat, runny nose, nausea, vomiting, constipation, diarrhea, rash or new skin lesions, chest pain, palpitations, slurred speech, new paresthesia, shortness of breath or wheeze.  no SI or HI, no a/v hallucinations      Exam:      General: alert and oriented ×3 no acute distress.      HEENT: pupils are equal round and reactive to light extraocular motion is intact. Normocephalic and atraumatic.       Hearing is grossly normal and there is no otorrhea.       Nares are patent there is no rhinorrhea.       Mucous membranes are moist and pink.      Chest: has bilateral rise with no increased work of breathing.      Cardiovascular: normal perfusion and brisk capillary refill.      Musculoskeletal: no gross focal abnormalities and normal gait.      Neuro: no gross focal abnormalities and memory seems intact.      Psychiatric: speech is clear and coherent and fluent. Patient dressed appropriately for the weather. Mood is anxious and affect is anxious.                     Discussed with patient to return to clinic if symptoms worsen or do not improve.  Physical Exam   Vitals & Measurements    T: 97.3   F (Tympanic)  HR: 90(Peripheral)  BP: 126/82  SpO2: 97%     WT: 197.6 lb   Assessment/Plan       Chronic tension headache (G44.229)          quetiapine increase seems to have helped with headaches.         Ordered:          DULoxetine, = 1 cap(s) ( 30 mg ), Oral, daily, # 7 cap(s), 0 Refill(s), Type: Maintenance, Pharmacy: [id number], 1 cap(s) Oral daily,x7 day(s), (Ordered)          DULoxetine, = 1 cap(s) ( 30 mg ), Oral, daily, # 30 cap(s), 0 Refill(s),  Type: Maintenance, Pharmacy: [id number], 1 cap(s) Oral daily, (Ordered)          Return to Clinic (Request), RFV: follow up headache and do annual, Return in 3 months                Generalized anxiety disorder (F41.1)          will taper off duloxetine and taper up lexapro.  15 minutes spent with patient in direct face to face contact, > 50% of time spent counseling and coordinating care.          Ordered:          DULoxetine, = 1 cap(s) ( 30 mg ), Oral, daily, # 7 cap(s), 0 Refill(s), Type: Maintenance, Pharmacy: [facility], 1 cap(s) Oral daily,x7 day(s), (Ordered)          DULoxetine, = 1 cap(s) ( 30 mg ), Oral, daily, # 30 cap(s), 0 Refill(s), Type: Maintenance, Pharmacy: [facility] [id number], 1 cap(s) Oral daily, (Ordered)          escitalopram, = 1 tab(s) ( 10 mg ), PO, Daily, # 30 tab(s), 1 Refill(s), Type: Maintenance, Pharmacy: [facility], 1 tab(s) Oral daily, (Ordered)          QUEtiapine, 1/2-1.5 tab(s), Oral, qhs, # 45 EA, 1 Refill(s), Type: Maintenance, Pharmacy: [facility], 1/2-1.5 tab(s) Oral qhs, (Ordered)                Orders:         LORazepam, = 1 tab(s) ( 1 mg ), Oral, tid, PRN: anxiety, # 10 tab(s), 0 Refill(s), Type: Maintenance, (Ordered)         Return to Clinic (Request), RFV: follow up mood and review outside records, Return in 2 weeks         Return to Clinic (Request), Return in 2 weeks  Patient Information     Name:ELI ECHOLS      Address:      [address] [address]     Sex:Female     YOB: 1970     [phone]     MRN:[id number]     FIN:[id number]     Location:[facility]     Date of Service:03/29/2019      Primary Care Physician:       NONE ,       Attending Physician:       Cris Gomez MD, [phone]  Problem List/Past Medical History    Ongoing     Chronic tension headache     Migraine headache     Obesity    Historical     Pregnancy     Pregnancy      Pregnancy     Pregnancy     Pregnancy     Pregnancy  Procedure/Surgical History      delivery NOS, unspecified (2011)           Other Postsurgical Status (2011)            section           Tonsillectomy        Medications     QUEtiapine 25 mg oral tablet: 1/2-1 tab(s), Oral, qhs, 30 EA, 1 Refill(s).     Desogen 0.15 mg-0.03 mg oral tablet: 1 tab(s), PO, Daily, take 21 days of active tablets then skip placebos and start the next pack, 90 tab(s), 3 Refill(s).     DULoxetine 30 mg oral delayed release capsule: 30 mg, 1 cap(s), Oral, daily, 30 cap(s), 0 Refill(s).     DULoxetine 30 mg oral delayed release capsule: 30 mg, 1 cap(s), Oral, daily, for 7 day(s), 7 cap(s), 0 Refill(s).     QUEtiapine 25 mg oral tablet: 1/2-1.5 tab(s), Oral, qhs, 45 EA, 1 Refill(s).     LORazepam 1 mg oral tablet: 1 mg, 1 tab(s), Oral, tid, PRN: anxiety, 10 tab(s), 0 Refill(s).     Lexapro 10 mg oral tablet: 10 mg, 1 tab(s), PO, Daily, 30 tab(s), 1 Refill(s).          Allergies    codeine  Social History    Smoking Status - 2019     Never smoker     Alcohol      Never, 2019     Employment and Education      Highest education level: University degree(s)., 2019     Exercise and Physical Activity      Exercise frequency: Daily. Exercise type: Walking dogs, elliptical., 2019     Home and Environment      Marital status: . Living situation: Home/Independent. Injuries/Abuse/Neglect in household: No., 2019     Nutrition and Health      Type of diet: Regular., 2019     Sexual      Identifies as female, Sexual orientation: Straight or heterosexual. History of STD: No. Uses condoms: No. Contraceptive Use Details: Birth control pill. History of sexual abuse: No., 2019     Substance Abuse      Never, 2019     Tobacco      Never (less than 100 in lifetime), 2019  Family History    Cancer of adrenal gland: Father.    Heart disease: Father.    Hypercholesterolemia:  Father.    Lung disorder: Father.  Immunizations      Vaccine Date Status      influenza 01/13/2018 Recorded      Td 06/13/2015 Recorded      meningococcal conjugate vaccine 08/22/2007 Recorded      Hep B 08/22/2007 Recorded      MMR (measles/mumps/rubella) 08/22/2007 Recorded      Hep B 06/22/2007 Recorded  Lab Results       Lab Results (Last 4 results within 90 days)        Sodium Level: 137 mmol/L [135 mmol/L - 146 mmol/L] (03/04/19 08:33:00)       Potassium Level: 4 mmol/L [3.5 mmol/L - 5.3 mmol/L] (03/04/19 08:33:00)       Chloride Level: 103 mmol/L [98 mmol/L - 110 mmol/L] (03/04/19 08:33:00)       CO2 Level: 23 mmol/L [20 mmol/L - 32 mmol/L] (03/04/19 08:33:00)       Glucose Level: 85 mg/dL [65 mg/dL - 99 mg/dL] (03/04/19 08:33:00)       BUN: 18 mg/dL [7 mg/dL - 25 mg/dL] (03/04/19 08:33:00)       Creatinine Level: 0.84 mg/dL [0.5 mg/dL - 1.1 mg/dL] (03/04/19 08:33:00)       BUN/Creat Ratio: NOT APPLICABLE [6  - 22] (03/04/19 08:33:00)       eGFR: 83 mL/min/1.73m2 (03/04/19 08:33:00)       eGFR : 96 mL/min/1.73m2 (03/04/19 08:33:00)       Calcium Level: 9.4 mg/dL [8.6 mg/dL - 10.2 mg/dL] (03/04/19 08:33:00)       Hgb A1c: 5.2 (03/04/19 08:33:00)       Cholesterol: 181 mg/dL (03/04/19 08:33:00)       Non-HDL Cholesterol: 103 (03/04/19 08:33:00)       HDL: 78 mg/dL (03/04/19 08:33:00)       Cholesterol/HDL Ratio: 2.3 (03/04/19 08:33:00)       LDL: 84 (03/04/19 08:33:00)       Triglyceride: 101 mg/dL (03/04/19 08:33:00)       TSH: 1.39 mIU/L (03/04/19 08:33:00)       YUMIKO IFA: NEGATIVE [NEGATIVE  - NEGATIVE] (01/14/19 11:59:00)       SSA (Ro): <1.0 NEG (01/14/19 11:59:00)       SSB (La): <1.0 NEG (01/14/19 11:59:00)       Rheumatoid Factor: <14 (01/14/19 11:59:00)       HPV High Risk: NOT DETECTED (03/12/19 12:49:00)
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
## 2022-02-15 NOTE — PROGRESS NOTES
Chief Complaint    medication management  History of Present Illness      patient present to clinic today for follow up mood      also see charlie 7 and Phq 9      Review of systems is negative except as per HPI including:  no fevers, chills, sore throat, runny nose, nausea, vomiting, constipation, diarrhea, rash or new skin lesions, chest pain, palpitations, slurred speech, new paresthesia, shortness of breath or wheeze.  no SI or HI, no a/v hallucinations      Exam:      General: alert and oriented ×3 no acute distress.      HEENT: pupils are equal round and reactive to light extraocular motion is intact. Normocephalic and atraumatic.       Hearing is grossly normal and there is no otorrhea.       Nares are patent there is no rhinorrhea.       Mucous membranes are moist and pink.      Chest: has bilateral rise with no increased work of breathing.      Cardiovascular: normal perfusion and brisk capillary refill.      Musculoskeletal: no gross focal abnormalities and normal gait.      Neuro: no gross focal abnormalities and memory seems intact.      Psychiatric: speech is clear and coherent and fluent. Patient dressed appropriately for the weather. Mood is appropriate and affect is full.                     Discussed with patient to return to clinic if symptoms worsen or do not improve.  Physical Exam   Vitals & Measurements    HR: 80(Peripheral)  BP: 120/68     HT: 67.5 in  WT: 198.8 lb  BMI: 30.67   Assessment/Plan       Bipolar 1 disorder (F31.0)         Ordered:          clonazePAM, = 1 tab(s) ( 1 mg ), Oral, bid, # 180 tab(s), 1 Refill(s), Type: Maintenance, Pharmacy: [facility], 1 tab(s) Oral bid, (Ordered)          610[address] office outpatient visit 15 minutes (Charge), Quantity: 1, Bipolar 1 disorder                Orders:         lamoTRIgine, = 3 tab(s) ( 300 mg ), Oral, daily, # 270 tab(s), 3 Refill(s), Type: Maintenance, Pharmacy: [facility], 3 tab(s) Oral daily, (Ordered)          lamoTRIgine, = 3 tab(s) ( 300 mg ), Oral, daily, # 21 tab(s), 0 Refill(s), Type: Hard Stop, Pharmacy: [facility], (Completed)         omeprazole, = 1 cap(s) ( 40 mg ), Oral, daily, # 90 cap(s), 3 Refill(s), Type: Maintenance, Pharmacy: [facility], 1 cap(s) Oral daily, (Ordered)         QUEtiapine, = 1 tab(s) ( 25 mg ), Oral, qhs, # 90 tab(s), 1 Refill(s), Type: Maintenance, Pharmacy: [facility], 1 tab(s) Oral qhs, (Ordered)  Patient Information     Name:ELI ECHOLS      Address:      [address] [id number]     Sex:Female     YOB: 1970     [phone]     Emergency Contact:IFTIKHAR ECHOLS JESSICA     MRN:[id number]     FIN:[id number]     Location:[facility]     Date of Service:2020      Primary Care Physician:       Cris Gomez MD, [phone]      Attending Physician:       Cris Gomez MD, [phone]  Problem List/Past Medical History    Ongoing     Bipolar 1 disorder     Chronic tension headache     SANTIAGO (generalized anxiety disorder)     Hypomania     Migraine headache     Obesity    Historical     Inpatient stay       Comments: @[facility], [place] - Hypomania - Bipolar 2 versus cyclothymia     Pregnancy     Pregnancy     Pregnancy     Pregnancy     Pregnancy     Pregnancy  Procedure/Surgical History      delivery NOS, unspecified (2011)     Other Postsurgical Status (2011)      section     Tonsillectomy  Medications    Claritin-D 12 Hour oral tablet, extended release, 1 tab(s), Oral, q12 hrs    clonazePAM 1 mg oral tablet, 1 mg= 1 tab(s), Oral, bid, 1 refills    clonazePAM 1 mg oral tablet, 1 mg= 1 tab(s), Oral, bid    LaMICtal 100 mg oral tablet, 300 mg= 3 tab(s), Oral, daily, 3 refills    omeprazole 40 mg oral delayed release capsule, 40 mg= 1 cap(s), Oral, daily, 3 refills    omeprazole 40 mg oral delayed release capsule, 40 mg= 1 cap(s), Oral, daily     QUEtiapine 25 mg oral tablet, 25 mg= 1 tab(s), Oral, qhs, 1 refills    QUEtiapine 25 mg oral tablet, See Instructions, 1 refills    QUEtiapine 50 mg oral tablet, extended release, See Instructions, 1 refills,   Not taking  Allergies    Depakote (Diarrhea)    codeine  Social History    Smoking Status - 03/13/2020     Never smoker     Alcohol      Never, 03/13/2019     Employment/School      Highest education level: University degree(s)., 03/14/2019     Exercise      Exercise frequency: Daily. Exercise type: Walking dogs, elliptical., 03/14/2019     Home/Environment      Marital status: . Living situation: Home/Independent. Injuries/Abuse/Neglect in household: No., 03/14/2019     Nutrition/Health      Type of diet: Regular., 03/14/2019     Sexual      Identifies as female, Sexual orientation: Straight or heterosexual. History of STD: No. Uses condoms: No. Contraceptive Use Details: Birth control pill. History of sexual abuse: No., 03/14/2019     Substance Abuse      Never, 03/14/2019     Tobacco      Never (less than 100 in lifetime), 03/14/2019  Family History    Cancer of adrenal gland: Father.    Heart disease: Father.    Hypercholesterolemia: Father.    Lung disorder: Father.  Immunizations      Vaccine Date Status          influenza virus vaccine, inactivated 10/28/2019 Given          influenza 01/13/2018 Recorded          Td 06/13/2015 Recorded          influenza, H1N1, live 12/09/2009 Recorded          meningococcal conjugate vaccine 08/22/2007 Recorded          Hep B 08/22/2007 Recorded          MMR (measles/mumps/rubella) 08/22/2007 Recorded          Hep B 06/22/2007 Recorded          Td 04/12/2001 Recorded
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
## 2022-02-15 NOTE — NURSING NOTE
Generalized Anxiety Disorder Screening Entered On:  5/11/2019 3:30 PM CDT    Performed On:  5/7/2019 3:29 PM CDT by Mariel April               Generalized Anxiety Disorder Screening   SANTIAGO Nervous, Anxious On Edge :   Nearly every day   SANTIAGO Control Worrying B :   Nearly every day   SANTIAGO Worrying Too Much :   More than half the days   SANTIAGO Restless :   Nearly every day   SANTIAGO Easily Annoyed/Irritable :   Nearly every day   SANTIAGO Afraid :   Several days   Mariel April - 5/11/2019 3:29 PM CDT

## 2022-02-15 NOTE — NURSING NOTE
Generalized Anxiety Disorder Screening Entered On:  3/31/2019 3:05 PM CDT    Performed On:  3/31/2019 3:03 PM CDT by Neetu Wood LPN               Generalized Anxiety Disorder Screening   SANTIAGO Nervous, Anxious On Edge :   Nearly every day   SANTIAGO Control Worrying B :   Several days   SANTIAGO Worrying Too Much :   Several days   SANTIAGO Restless :   Not at all   SANTIAGO Easily Annoyed/Irritable :   More than half the days   SANTIAGO Afraid :   Not at all   SANTIAGO Trouble Relaxing :   More than half the days   SANTIAGO Total Screening Score :   9    SANTIAGO Difficulty with Work, Home, Others :   Somewhat difficult   Neetu Wood LPN - 3/31/2019 3:03 PM CDT

## 2022-02-15 NOTE — PROGRESS NOTES
Chief Complaint    c/o fever, cough, chest congestion since yesterday  History of Present Illness      Chief complaint as above reviewed and confirmed with patient.  Pt presents to the clinic with concerns re: cough, fever, ST, and muscle aches x 1 day.  with same and has clinical dx of influenza. no nausea, vomiting. no sob or difficulty breathing. no rash . no chest pain.  No wheezing.  Review of Systems      Review of systems is negative with the exception of those noted in HPI          Physical Exam   Vitals & Measurements    T: 98.4   F (Tympanic)  HR: 98(Peripheral)  BP: 130/80  SpO2: 98%     HT: 67.5 in  WT: 195 lb  BMI: 30.09           Vitals as above per nursing documentation           Constitutional : nad appears well          Ears: ears patent B, TMS intact, noninjected           Nose: nasal mucosa is non-ededmatous. no discharge           Throat: pharynx is nonerythematous, no tonsillar hypertrophy, no exudate           Neck: neck supple, no adenopathy, no thyromegaly, no rigidity           Lungs: lungs CTA', no Wheezes, rhonchi or rales           Heart: heart RRR, nl S1, S2 no murmur           skin:  No rashes              Assessment/Plan       1. Influenza-like illness (R69)         tamiflu as ordered.  Push fluids, rest and ibuprofen or tylenol for comfort.  Pt instructed to return to clinic for persistent or worsening symptoms.                  Orders:         oseltamivir, = 1 cap(s) ( 75 mg ), PO, BID, # 10 cap(s), 0 Refill(s), Type: Maintenance, Pharmacy: [id number], 1 cap(s) Oral bid,x5 day(s), (Ordered)  Patient Information     Name:ELI ECHOLS      Address:      [address] [address]     Sex:Female     YOB: 1970     [phone]     MRN:[id number]     FIN:[id number]     Location:[facility]     Date of Service:03/10/2019      Primary Care Physician:       NONE ,       Attending Physician:       Luz Maria  TRINI, Yusra ANDERSON, [phone]  Problem List/Past Medical History    Ongoing     Chronic tension headache     Migraine headache     Obesity    Historical     Pregnancy  Procedure/Surgical History      delivery NOS, unspecified (2011)           Other Postsurgical Status (2011)        Medications     Desogen 0.15 mg-0.03 mg oral tablet: 1 tab(s), PO, Daily, take 21 days of active tablets then skip placebos and start the next pack, 112 tab(s), 3 Refill(s).     DULoxetine 60 mg oral delayed release capsule: 60 mg, 1 cap(s), Oral, daily, 90 cap(s), 1 Refill(s).     Tamiflu 75 mg oral capsule: 75 mg, 1 cap(s), PO, BID, for 5 day(s), 10 cap(s), 0 Refill(s).          Allergies    codeine  Social History    Smoking Status - 03/10/2019     Never smoker     Alcohol      Never, 01/15/2019  Lab Results       Lab Results (Last 4 results within 90 days)        Sodium Level: 137 mmol/L (19 08:33:00)       Potassium Level: 4 mmol/L (19 08:33:00)       Chloride Level: 103 mmol/L (19 08:33:00)       CO2 Level: 23 mmol/L (19 08:33:00)       Glucose Level: 85 mg/dL (19 08:33:00)       BUN: 18 mg/dL (19 08:33:00)       Creatinine Level: 0.84 mg/dL (19 08:33:00)       BUN/Creat Ratio: NOT APPLICABLE (19 08:33:00)       eGFR: 83 mL/min/1.73m2 (19 08:33:00)       eGFR : 96 mL/min/1.73m2 (19 08:33:00)       Calcium Level: 9.4 mg/dL (19 08:33:00)       Hgb A1c: 5.2 (19 08:33:00)       Cholesterol: 181 mg/dL (19 08:33:00)       Non-HDL Cholesterol: 103 (19 08:33:00)       HDL: 78 mg/dL (19 08:33:00)       Cholesterol/HDL Ratio: 2.3 (19 08:33:00)       LDL: 84 (19 08:33:00)       Triglyceride: 101 mg/dL (19 08:33:00)       TSH: 1.39 mIU/L (19 08:33:00)       YUMIKO IFA: NEGATIVE (19 11:59:00)       SSA (Ro): <1.0 NEG (19 11:59:00)       SSB (La): <1.0 NEG (19 11:59:00)        Rheumatoid Factor: <14 (01/14/19 11:59:00)

## 2022-02-15 NOTE — NURSING NOTE
Comprehensive Intake Entered On:  4/6/2021 3:34 PM CDT    Performed On:  4/6/2021 3:22 PM CDT by Angelic Greenfield CMA               Summary   Chief Complaint :   Concerns regarding hypertension. 129/82 this morning and now 161/89. Concerned this may be due to PT therapy   Steve Greenfield CMAhanie - 4/6/2021 3:36 PM CDT     Menstrual Status :   Menarcheal   Weight Measured :   198 lb(Converted to: 198 lb 0 oz, 89.811 kg)    Height/Length Estimated :   67.5 in(Converted to: 5 ft 7 in, 171.45 cm)    Systolic Blood Pressure :   134 mmHg (HI)    Diastolic Blood Pressure :   88 mmHg (HI)    Mean Arterial Pressure :   103 mmHg   Peripheral Pulse Rate :   77 bpm   BP Site :   Right arm   BP Method :   Manual   Temperature Tympanic :   98.8 DegF(Converted to: 37.1 DegC)    Oxygen Saturation :   97 %   Angelic Greenfield CMA - 4/6/2021 3:22 PM CDT   Health Status   Allergies Verified? :   Yes   Medication History Verified? :   Yes   Medical History Verified? :   Yes   Pre-Visit Planning Status :   Completed   Tobacco Use? :   Never smoker   Angelic Greenfield CMA - 4/6/2021 3:22 PM CDT   Consents   Consent for Immunization Exchange :   Consent Granted   Consent for Immunizations to Providers :   Consent Granted   Angelic Greenfield CMA - 4/6/2021 3:22 PM CDT   Meds / Allergies   (As Of: 4/6/2021 3:34:33 PM CDT)   Allergies (Active)   codeine  Estimated Onset Date:   Unspecified ; Created By:   Generated Domain User for [id number]; Reaction Status:   Active ; Substance:   codeine ; Updated By:   Generated Domain User for [id number]; Source:   Patient ; Reviewed Date:   4/6/2021 3:28 PM CDT      Depakote  Estimated Onset Date:   Unspecified ; Reactions:   Diarrhea ; Comments:     Comment 1: developed severe diarrhea at high doses   ; Created By:   Cris Gomez MD; Reaction Status:   Active ; Category:   Drug ; Substance:   Depakote ; Type:   Secondary Effect ; Updated By:   Cris Gomez MD; Reviewed Date:   4/6/2021 3:28 PM CDT        Medication  List   (As Of: 4/6/2021 3:34:33 PM CDT)   Prescription/Discharge Order    clonazePAM  :   clonazePAM ; Status:   Prescribed ; Ordered As Mnemonic:   clonazePAM 1 mg oral tablet ; Simple Display Line:   1 mg, 1 tab(s), Oral, bid, 180 tab(s), 1 Refill(s) ; Ordering Provider:   Cris Gomez MD; Catalog Code:   clonazePAM ; Order Dt/Tm:   9/10/2020 11:24:24 AM CDT          lamoTRIgine  :   lamoTRIgine ; Status:   Prescribed ; Ordered As Mnemonic:   LaMICtal 100 mg oral tablet ; Simple Display Line:   150 mg, 1.5 tab(s), Oral, daily, 270 tab(s), 3 Refill(s) ; Ordering Provider:   Irwin Eckert MD; Catalog Code:   lamoTRIgine ; Order Dt/Tm:   3/13/2020 1:57:43 PM CDT          omeprazole  :   omeprazole ; Status:   Prescribed ; Ordered As Mnemonic:   omeprazole 40 mg oral delayed release capsule ; Simple Display Line:   40 mg, 1 cap(s), Oral, daily, 90 cap(s), 3 Refill(s) ; Ordering Provider:   Cris Gomez MD; Catalog Code:   omeprazole ; Order Dt/Tm:   3/13/2020 1:52:47 PM CDT            Home Meds    loratadine-pseudoephedrine  :   loratadine-pseudoephedrine ; Status:   Documented ; Ordered As Mnemonic:   Claritin-D 12 Hour oral tablet, extended release ; Simple Display Line:   1 tab(s), Oral, q12 hrs, 0 Refill(s) ; Catalog Code:   loratadine-pseudoephedrine ; Order Dt/Tm:   1/31/2020 8:30:45 AM CST          QUEtiapine  :   QUEtiapine ; Status:   Documented ; Ordered As Mnemonic:   QUEtiapine 25 mg oral tablet ; Simple Display Line:   25 mg, 1 tab(s), 0 Refill(s) ; Catalog Code:   QUEtiapine ; Order Dt/Tm:   4/6/2021 3:30:37 PM CDT            ID Risk Screen   Recent Travel History :   No recent travel   Family Member Travel History :   No recent travel   Other Exposure to Infectious Disease :   Unknown   COVID-19 Testing Status :   No COVID-19 test performed   [facility], Angelic - 4/6/2021 3:22 PM CDT   Social History   Social History   (As Of: 4/6/2021 3:34:33 PM CDT)   Alcohol:        Never   (Last  Updated: 3/13/2019 10:12:21 AM CDT by Erica Gordillo          Tobacco:        Never (less than 100 in lifetime)   (Last Updated: 1/25/2021 8:21:32 AM CST by Elsy Doss LPN)          Electronic Cigarette/Vaping:        Electronic Cigarette Use: Never.   (Last Updated: 1/25/2021 8:21:36 AM CST by Elsy Doss LPN)          Substance Abuse:        Never   (Last Updated: 3/14/2019 3:36:43 PM CDT by Cori Schwartz)          Employment/School:        Highest education level: University degree(s).   (Last Updated: 3/14/2019 3:37:34 PM CDT by Cori Schwartz)          Home/Environment:        Marital status: .  Living situation: Home/Independent.  Injuries/Abuse/Neglect in household: No.   (Last Updated: 3/14/2019 3:37:54 PM CDT by Cori Schwartz)          Nutrition/Health:        Type of diet: Regular.   (Last Updated: 3/14/2019 3:36:50 PM CDT by Cori Schwartz)          Exercise:        Exercise frequency: Daily.  Exercise type: Walking dogs, elliptical.   (Last Updated: 3/14/2019 3:37:21 PM CDT by Cori Schwartz)          Sexual:        Identifies as female, Sexual orientation: Straight or heterosexual.  History of STD: No.  Uses condoms: No.  Contraceptive Use Details: Birth control pill.  History of sexual abuse: No.   (Last Updated: 3/14/2019 3:38:34 PM CDT by Cori Schwartz)

## 2022-02-15 NOTE — LETTER
(Inserted Image. Unable to display) September 11, 2020Re: ELI LINDAONDOB:  1970 [facility] [address] [address]Um:  [facility]The following patient has been referred to your office/practice: ELI ONESIMO  Appointment pending.  Please call patient to schedule.Please refer to the attached clinical documentation for a summary of ELI's care.  Please do not hesitate to contact our office if any additional clinical questions arise.  All relevant records and transition of care documents should be mailed or faxed.  Your assistance in providing continuity of care is appreciated.Sincerely, [facility] & [address] [address](P) [phone](F) [phone]

## 2022-02-15 NOTE — NURSING NOTE
Comprehensive Intake Entered On:  4/22/2019 9:41 AM CDT    Performed On:  4/22/2019 9:37 AM CDT by Kong SANCHEZ, Sandra               Summary   Chief Complaint :   f/u anxiety.  also needs referral to outpatient psychologist/psychiatry.   Menstrual Status :   Menarcheal   Weight Measured :   194.6 lb(Converted to: 194 lb 10 oz, 88.27 kg)    Height Measured :   67.5 in(Converted to: 5 ft 7 in, 171.45 cm)    Body Mass Index :   30.03 kg/m2 (HI)    Body Surface Area :   2.05 m2   Systolic Blood Pressure :   124 mmHg   Diastolic Blood Pressure :   70 mmHg   Mean Arterial Pressure :   88 mmHg   Peripheral Pulse Rate :   92 bpm   BP Site :   Right arm   Pulse Site :   Radial artery   BP Method :   Manual   HR Method :   Manual   Temperature Tympanic :   98.8 DegF(Converted to: 37.1 DegC)    Sandra Triana MA - 4/22/2019 9:37 AM CDT   Health Status   Allergies Verified? :   Yes   Medication History Verified? :   Yes   Medical History Verified? :   Yes   Pre-Visit Planning Status :   Completed   Tobacco Use? :   Never smoker   Sandra Triana MA - 4/22/2019 9:37 AM CDT   Consents   Consent for Immunization Exchange :   Consent Granted   Consent for Immunizations to Providers :   Consent Granted   Sandra rTiana MA - 4/22/2019 9:37 AM CDT   Meds / Allergies   (As Of: 4/22/2019 9:41:59 AM CDT)   Allergies (Active)   codeine  Estimated Onset Date:   Unspecified ; Created By:   Generated Domain User for [id number]; Reaction Status:   Active ; Substance:   codeine ; Updated By:   Generated Domain User for [id number]; Source:   Patient ; Reviewed Date:   3/12/2019 10:15 AM CDT        Medication List   (As Of: 4/22/2019 9:41:59 AM CDT)   Prescription/Discharge Order    desogestrel-ethinyl estradiol  :   desogestrel-ethinyl estradiol ; Status:   Completed ; Ordered As Mnemonic:   Desogen 0.15 mg-0.03 mg oral tablet ; Simple Display Line:   1 tab(s), PO, Daily, take 21 days of active tablets then skip placebos and start the next pack, 90  tab(s), 3 Refill(s) ; Ordering Provider:   Cris Gomez MD; Catalog Code:   desogestrel-ethinyl estradiol ; Order Dt/Tm:   3/12/2019 10:54:40 AM          divalproex sodium  :   divalproex sodium ; Status:   Prescribed ; Ordered As Mnemonic:   Depakote  mg oral tablet, extended release ; Simple Display Line:   250 mg, 1 tab(s), Oral, daily, 30 tab(s), 1 Refill(s) ; Ordering Provider:   Cris Gomez MD; Catalog Code:   divalproex sodium ; Order Dt/Tm:   4/17/2019 11:20:09 AM          escitalopram  :   escitalopram ; Status:   Completed ; Ordered As Mnemonic:   Lexapro 10 mg oral tablet ; Simple Display Line:   10 mg, 1 tab(s), PO, Daily, 30 tab(s), 1 Refill(s) ; Ordering Provider:   Cris Gomez MD; Catalog Code:   escitalopram ; Order Dt/Tm:   3/29/2019 9:24:13 AM          LORazepam  :   LORazepam ; Status:   Completed ; Ordered As Mnemonic:   LORazepam 1 mg oral tablet ; Simple Display Line:   1 mg, 1 tab(s), Oral, tid, PRN: anxiety, 14 tab(s), 0 Refill(s) ; Ordering Provider:   Alexey Lee MD; Catalog Code:   LORazepam ; Order Dt/Tm:   4/4/2019 12:03:23 PM          QUEtiapine  :   QUEtiapine ; Status:   Completed ; Ordered As Mnemonic:   QUEtiapine 25 mg oral tablet ; Simple Display Line:   1/2-1.5 tab(s), Oral, qhs, 45 EA, 1 Refill(s) ; Ordering Provider:   Cris Gomez MD; Catalog Code:   QUEtiapine ; Order Dt/Tm:   3/29/2019 9:19:03 AM          QUEtiapine  :   QUEtiapine ; Status:   Completed ; Ordered As Mnemonic:   QUEtiapine 25 mg oral tablet ; Simple Display Line:   1/2-1 tab(s), Oral, qhs, 30 EA, 1 Refill(s) ; Ordering Provider:   Cris Gomez MD; Catalog Code:   QUEtiapine ; Order Dt/Tm:   3/12/2019 10:50:46 AM            Home Meds    clonazePAM  :   clonazePAM ; Status:   Documented ; Ordered As Mnemonic:   clonazePAM 1 mg oral tablet ; Simple Display Line:   1 mg, 1 tab(s), Oral, bid, 0 Refill(s) ; Catalog Code:   clonazePAM ; Order Dt/Tm:   4/17/2019 10:32:48 AM             Social History   Social History   (As Of: 4/22/2019 9:41:59 AM CDT)   Alcohol:        Never   (Last Updated: 3/13/2019 10:12:21 AM CDT by Erica Gordillo)          Tobacco:        Never (less than 100 in lifetime)   (Last Updated: 3/14/2019 3:36:35 PM CDT by Cori Schwartz)          Substance Abuse:        Never   (Last Updated: 3/14/2019 3:36:43 PM CDT by Cori Schwartz)          Employment and Education:        Highest education level: University degree(s).   (Last Updated: 3/14/2019 3:37:34 PM CDT by Cori Schwartz)          Home and Environment:        Marital status: .  Living situation: Home/Independent.  Injuries/Abuse/Neglect in household: No.   (Last Updated: 3/14/2019 3:37:54 PM CDT by Cori Schwartz)          Nutrition and Health:        Type of diet: Regular.   (Last Updated: 3/14/2019 3:36:50 PM CDT by Cori Schwartz)          Exercise and Physical Activity:        Exercise frequency: Daily.  Exercise type: Walking dogs, elliptical.   (Last Updated: 3/14/2019 3:37:21 PM CDT by Cori Schwartz)          Sexual:        Identifies as female, Sexual orientation: Straight or heterosexual.  History of STD: No.  Uses condoms: No.  Contraceptive Use Details: Birth control pill.  History of sexual abuse: No.   (Last Updated: 3/14/2019 3:38:34 PM CDT by Cori Schwartz)

## 2022-02-15 NOTE — NURSING NOTE
Comprehensive Intake Entered On:  3/29/2019 8:34 AM CDT    Performed On:  3/29/2019 8:30 AM CDT by Dinorah Torres CMA               Summary   Chief Complaint :   Med check- quetiapine    Menstrual Status :   Menarcheal   Weight Measured :   197.6 lb(Converted to: 197 lb 10 oz, 89.63 kg)    Systolic Blood Pressure :   126 mmHg   Diastolic Blood Pressure :   82 mmHg (HI)    Mean Arterial Pressure :   97 mmHg   Peripheral Pulse Rate :   90 bpm   BP Site :   Right arm   BP Method :   Manual   HR Method :   Electronic   Temperature Tympanic :   97.3 DegF(Converted to: 36.3 DegC)  (LOW)    Oxygen Saturation :   97 %   Dinorah Torres CMA - 3/29/2019 8:30 AM CDT   Health Status   Allergies Verified? :   Yes   Medication History Verified? :   Yes   Pre-Visit Planning Status :   Completed   Tobacco Use? :   Never smoker   Dinorah Torres CMA - 3/29/2019 8:30 AM CDT   Consents   Consent for Immunization Exchange :   Consent Granted   Consent for Immunizations to Providers :   Consent Granted   Dinorah Torres CMA - 3/29/2019 8:30 AM CDT   Meds / Allergies   (As Of: 3/29/2019 8:34:07 AM CDT)   Allergies (Active)   codeine  Estimated Onset Date:   Unspecified ; Created By:   Generated Domain User for [id number]; Reaction Status:   Active ; Substance:   codeine ; Updated By:   Generated Domain User for [id number]; Source:   Patient ; Reviewed Date:   3/12/2019 10:15 AM CDT        Medication List   (As Of: 3/29/2019 8:34:07 AM CDT)   Prescription/Discharge Order    desogestrel-ethinyl estradiol  :   desogestrel-ethinyl estradiol ; Status:   Prescribed ; Ordered As Mnemonic:   Desogen 0.15 mg-0.03 mg oral tablet ; Simple Display Line:   1 tab(s), PO, Daily, take 21 days of active tablets then skip placebos and start the next pack, 90 tab(s), 3 Refill(s) ; Ordering Provider:   Cris Gomez MD; Catalog Code:   desogestrel-ethinyl estradiol ; Order Dt/Tm:   3/12/2019 10:54:40 AM          DULoxetine  :   DULoxetine ; Status:   Prescribed ; Ordered  As Mnemonic:   DULoxetine 60 mg oral delayed release capsule ; Simple Display Line:   60 mg, 1 cap(s), Oral, daily, 90 cap(s), 3 Refill(s) ; Ordering Provider:   Cris Gomez MD; Catalog Code:   DULoxetine ; Order Dt/Tm:   3/12/2019 10:54:56 AM          oseltamivir  :   oseltamivir ; Status:   Processing ; Ordered As Mnemonic:   Tamiflu 75 mg oral capsule ; Ordering Provider:   Yusra Loera PA-C; Action Display:   Complete ; Catalog Code:   oseltamivir ; Order Dt/Tm:   3/29/2019 8:32:54 AM          QUEtiapine  :   QUEtiapine ; Status:   Prescribed ; Ordered As Mnemonic:   QUEtiapine 25 mg oral tablet ; Simple Display Line:   1/2-1 tab(s), Oral, qhs, 30 EA, 1 Refill(s) ; Ordering Provider:   Cris Gomez MD; Catalog Code:   QUEtiapine ; Order Dt/Tm:   3/12/2019 10:50:46 AM
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
## 2022-02-15 NOTE — NURSING NOTE
Depression Screening Entered On:  3/13/2019 10:13 AM CDT    Performed On:  3/12/2019 10:12 AM CDT by Erica Gordillo               Depression Screening   Feeling Down, Depressed, Hopeless :   Not at all   Little Interest - Pleasure in Activities :   Not at all   Initial Depression Screen Score :   0    Trouble Falling or Staying Asleep :   More than half the days   Feeling Tired or Little Energy :   More than half the days   Poor Appetite or Overeating :   Not at all   Feeling Bad About Yourself :   Not at all   Trouble Concentrating :   Not at all   Moving or Speaking Slowly :   Not at all   Thoughts Better Off Dead or Hurting Self :   Not at all   Detailed Depression Screen Score :   4    Total Depression Screen Score :   4    Erica Gordillo - 3/13/2019 10:12 AM CDT

## 2022-02-15 NOTE — NURSING NOTE
Generalized Anxiety Disorder Screening Entered On:  4/25/2019 5:19 PM CDT    Performed On:  4/22/2019 5:19 PM CDT by Erica Gordillo               Generalized Anxiety Disorder Screening   SANTIAGO Nervous, Anxious On Edge :   Nearly every day   SANTIAGO Control Worrying B :   Several days   SANTIAGO Worrying Too Much :   Several days   SANTIAGO Restless :   More than half the days   SANTIAGO Easily Annoyed/Irritable :   Nearly every day   SANTIAGO Afraid :   Not at all   SANTIAGO Trouble Relaxing :   Nearly every day   SANTIAGO Total Screening Score :   13    Erica Gordillo - 4/25/2019 5:19 PM CDT

## 2022-02-15 NOTE — LETTER
(Inserted Image. Unable to display)       March 25, 2019      ELISENG ECHOLS   [address] [place] [id number]        Dear ELI,     Thank you for selecting [facility] for your healthcare needs. Below you will find the results of your recent test(s) done at our clinic.      Your pap smear cells look normal and you do not have high risk HPV, so your next pap smear should be in 5 years.        Result Name Current Result   Thinprep Report   3/12/2019   HPV High Risk  NOT DETECTED 3/12/2019     Please contact my practice at [phone] if you have any questions or concerns.     Sincerely,        Cris Gomez MD      What do your labs mean?  Below is a glossary of commonly ordered labs:  LDL   Bad Cholesterol   HDL   Good Cholesterol  AST/ALT   Liver Function   Cr/Creatinine   Kidney Function  Microalbumin   Kidney Function  BUN   Kidney Function  PSA   Prostate    TSH   Thyroid Hormone  HgbA1c   Diabetes Test   Hgb (Hemoglobin)   Red Blood Cells

## 2022-02-15 NOTE — NURSING NOTE
Comprehensive Intake Entered On:  4/4/2019 11:51 AM CDT    Performed On:  4/4/2019 11:48 AM CDT by Randi Mohamud CMA               Summary   Chief Complaint :   Med refills   Menstrual Status :   Menarcheal   Weight Measured :   195.2 lb(Converted to: 195 lb 3 oz, 88.54 kg)    Height Measured :   67.5 in(Converted to: 5 ft 7 in, 171.45 cm)    Body Mass Index :   30.12 kg/m2 (HI)    Body Surface Area :   2.05 m2   Systolic Blood Pressure :   118 mmHg   Diastolic Blood Pressure :   74 mmHg   Mean Arterial Pressure :   89 mmHg   Peripheral Pulse Rate :   64 bpm   Randi Mohamud CMA - 4/4/2019 11:48 AM CDT   Health Status   Allergies Verified? :   Yes   Medication History Verified? :   Yes   Pre-Visit Planning Status :   Completed   Tobacco Use? :   Never smoker   Randi Mohamud CMA - 4/4/2019 11:48 AM CDT   Consents   Consent for Immunization Exchange :   Consent Granted   Consent for Immunizations to Providers :   Consent Granted   Randi Mohamud CMA - 4/4/2019 11:48 AM CDT   Meds / Allergies   (As Of: 4/4/2019 11:51:56 AM CDT)   Allergies (Active)   codeine  Estimated Onset Date:   Unspecified ; Created By:   Generated Domain User for [id number]; Reaction Status:   Active ; Substance:   codeine ; Updated By:   Generated Domain User for [id number]; Source:   Patient ; Reviewed Date:   3/12/2019 10:15 AM CDT        Medication List   (As Of: 4/4/2019 11:51:56 AM CDT)   Prescription/Discharge Order    desogestrel-ethinyl estradiol  :   desogestrel-ethinyl estradiol ; Status:   Prescribed ; Ordered As Mnemonic:   Desogen 0.15 mg-0.03 mg oral tablet ; Simple Display Line:   1 tab(s), PO, Daily, take 21 days of active tablets then skip placebos and start the next pack, 90 tab(s), 3 Refill(s) ; Ordering Provider:   Cris Gomez MD; Catalog Code:   desogestrel-ethinyl estradiol ; Order Dt/Tm:   3/12/2019 10:54:40 AM          DULoxetine  :   DULoxetine ; Status:   Prescribed ; Ordered As Mnemonic:    DULoxetine 30 mg oral delayed release capsule ; Simple Display Line:   30 mg, 1 cap(s), Oral, daily, for 7 day(s), 7 cap(s), 0 Refill(s) ; Ordering Provider:   Cris Gomez MD; Catalog Code:   DULoxetine ; Order Dt/Tm:   3/29/2019 9:20:52 AM          DULoxetine  :   DULoxetine ; Status:   Prescribed ; Ordered As Mnemonic:   DULoxetine 30 mg oral delayed release capsule ; Simple Display Line:   30 mg, 1 cap(s), Oral, daily, 30 cap(s), 0 Refill(s) ; Ordering Provider:   Cris Gomez MD; Catalog Code:   DULoxetine ; Order Dt/Tm:   3/29/2019 9:12:59 AM          escitalopram  :   escitalopram ; Status:   Prescribed ; Ordered As Mnemonic:   Lexapro 10 mg oral tablet ; Simple Display Line:   10 mg, 1 tab(s), PO, Daily, 30 tab(s), 1 Refill(s) ; Ordering Provider:   Cris Gomez MD; Catalog Code:   escitalopram ; Order Dt/Tm:   3/29/2019 9:24:13 AM          LORazepam  :   LORazepam ; Status:   Prescribed ; Ordered As Mnemonic:   LORazepam 1 mg oral tablet ; Simple Display Line:   1 mg, 1 tab(s), Oral, tid, PRN: anxiety, 10 tab(s), 0 Refill(s) ; Ordering Provider:   Cris Gomez MD; Catalog Code:   LORazepam ; Order Dt/Tm:   3/29/2019 9:26:29 AM          QUEtiapine  :   QUEtiapine ; Status:   Prescribed ; Ordered As Mnemonic:   QUEtiapine 25 mg oral tablet ; Simple Display Line:   1/2-1.5 tab(s), Oral, qhs, 45 EA, 1 Refill(s) ; Ordering Provider:   Cris Gomez MD; Catalog Code:   QUEtiapine ; Order Dt/Tm:   3/29/2019 9:19:03 AM          QUEtiapine  :   QUEtiapine ; Status:   Prescribed ; Ordered As Mnemonic:   QUEtiapine 25 mg oral tablet ; Simple Display Line:   1/2-1 tab(s), Oral, qhs, 30 EA, 1 Refill(s) ; Ordering Provider:   Cris Gomez MD; Catalog Code:   QUEtiapine ; Order Dt/Tm:   3/12/2019 10:50:46 AM

## 2022-02-15 NOTE — NURSING NOTE
Comprehensive Intake Entered On:  3/10/2019 9:09 AM CDT    Performed On:  3/10/2019 9:05 AM CDT by Violeta Arizmendi CMA               Summary   Chief Complaint :   c/o fever, cough, chest congestion since yesterday    Weight Measured :   195 lb(Converted to: 195 lb 0 oz, 88.45 kg)    Height Measured :   67.5 in(Converted to: 5 ft 7 in, 171.45 cm)    Body Mass Index :   30.09 kg/m2 (HI)    Body Surface Area :   2.05 m2   Systolic Blood Pressure :   130 mmHg   Diastolic Blood Pressure :   80 mmHg   Mean Arterial Pressure :   97 mmHg   Peripheral Pulse Rate :   98 bpm   BP Site :   Right arm   Pulse Site :   Radial artery   BP Method :   Manual   HR Method :   Manual   Temperature Tympanic :   98.4 DegF(Converted to: 36.9 DegC)    Oxygen Saturation :   98 %   Violeta Arizmendi CMA - 3/10/2019 9:05 AM CDT   Health Status   Allergies Verified? :   Yes   Medication History Verified? :   Yes   Medical History Verified? :   No   Pre-Visit Planning Status :   Not completed   Tobacco Use? :   Never smoker   Violeta Arizmendi CMA - 3/10/2019 9:05 AM CDT   Meds / Allergies   (As Of: 3/10/2019 9:09:10 AM CDT)   Allergies (Active)   codeine  Estimated Onset Date:   Unspecified ; Created By:   [employer] Domain User for [id number]; Reaction Status:   Active ; Substance:   codeine ; Updated By:   Generated Domain User for [id number]; Source:   Patient ; Reviewed Date:   3/10/2019 9:07 AM CDT        Medication List   (As Of: 3/10/2019 9:09:10 AM CDT)   Prescription/Discharge Order    desogestrel-ethinyl estradiol  :   desogestrel-ethinyl estradiol ; Status:   Prescribed ; Ordered As Mnemonic:   Desogen 0.15 mg-0.03 mg oral tablet ; Simple Display Line:   1 tab(s), PO, Daily, take 21 days of active tablets then skip placebos and start the next pack, 112 tab(s), 3 Refill(s) ; Ordering Provider:   Cris Gomez MD; Catalog Code:   desogestrel-ethinyl estradiol ; Order Dt/Tm:   1/14/2019 11:44:05 AM          DULoxetine  :   DULoxetine ; Status:    Prescribed ; Ordered As Mnemonic:   DULoxetine 60 mg oral delayed release capsule ; Simple Display Line:   60 mg, 1 cap(s), Oral, daily, 90 cap(s), 1 Refill(s) ; Ordering Provider:   Cris Gomez MD; Catalog Code:   DULoxetine ; Order Dt/Tm:   1/14/2019 11:45:15 AM          ondansetron  :   ondansetron ; Status:   Processing ; Ordered As Mnemonic:   Zofran 4 mg oral tablet ; Ordering Provider:   Bridgett Rendon; Action Display:   Complete ; Catalog Code:   ondansetron ; Order Dt/Tm:   3/10/2019 9:07:26 AM          SUMAtriptan  :   SUMAtriptan ; Status:   Processing ; Ordered As Mnemonic:   Imitrex 25 mg oral tablet ; Ordering Provider:   Bridgett Rendon; Action Display:   Complete ; Catalog Code:   SUMAtriptan ; Order Dt/Tm:   3/10/2019 9:07:26 AM

## 2022-02-15 NOTE — PROGRESS NOTES
Chief Complaint    Med check/refills.  History of Present Illness      patient present to clinic today for follow up, charlie 7= 7, phq9=4      She has been diagnosed with bipolar disorder and has been under the care of psychiatry.  She was started on clonazepam 1 mg 1 p.o. twice daily for the anxiety associated with this and is found that it has significantly improved her vision.  She has a history of strabismus and has undergone numerous eye surgeries.  Her mood is well controlled and she is asked me to take over the prescribing of her psychiatric medications with the blessings from her psychiatrist for me to do this.  We have discussed that with her mood being better controlled now I really like to try to taper her off of the clonazepam.  She has brought in an article today that discusses something called oscillopsia and thinks that the clonazepam helps control the symptoms.  I agree with her that it certainly is feasible that this is why her vision has improved.  There seem to be many different treatment options for this and I would like for her to get treatment with something that does not have the risk of life-threatening seizures if she is suddenly unable to take her medication for some reason.  She is in agreement with this.      Review of systems is negative except as per HPI including:  no fevers, chills, sore throat, runny nose, nausea, vomiting, constipation, diarrhea, rash or new skin lesions, chest pain, palpitations, slurred speech, new paresthesia, shortness of breath or wheeze.      Exam:      General: alert and oriented ×3 no acute distress.      HEENT: pupils are equal round and reactive to light extraocular motion is intact. Normocephalic and atraumatic.       Hearing is grossly normal and there is no otorrhea.       Nares are patent there is no rhinorrhea.       Mucous membranes are moist and pink.      Chest: has bilateral rise with no increased work of breathing.      Cardiovascular: normal  perfusion and brisk capillary refill.      Musculoskeletal: no gross focal abnormalities and normal gait.      Neuro: no gross focal abnormalities and memory seems intact.      Psychiatric: speech is clear and coherent and fluent. Patient dressed appropriately for the weather. Mood is appropriate and affect is full.      She has good insight and good judgment her thought processes are linear.                     Discussed with patient to return to clinic if symptoms worsen or do not improve  Physical Exam   Vitals & Measurements    T: 98.3  F (Tympanic)  HR: 92 (Peripheral)  BP: 118/78  SpO2: 98%     HT: 67.5 in  WT: 190.2 lb   Assessment/Plan       Bipolar 1 disorder (F31.0)                SANTIAGO (generalized anxiety disorder) (F41.1)                History of eye surgery (Z98.890)         Ordered:          Referral (Request), 09/10/20 11:22:00 CDT, Referred to: Ophthalmology, [facility], Oscillopsia  Nystagmus  Strabismus  History of eye surgery                Nystagmus (H55.00)         Ordered:          01657 office outpatient visit 25 minutes (Charge), Quantity: 1, Oscillopsia  Nystagmus  Strabismus          Referral (Request), 09/10/20 11:22:00 CDT, Referred to: Ophthalmology, Referred to: [facility], Oscillopsia  Nystagmus  Strabismus  History of eye surgery                Oscillopsia (H53.19)         Ordered:          17893 office outpatient visit 25 minutes (Charge), Quantity: 1, Oscillopsia  Nystagmus  Strabismus          Referral (Request), 09/10/20 11:22:00 CDT, Referred to: Ophthalmology, Referred to: [facility], Oscillopsia  Nystagmus  Strabismus  History of eye surgery                Strabismus (H50.9)         Ordered:          27442 office outpatient visit 25 minutes (Charge), Quantity: 1, Oscillopsia  Nystagmus  Strabismus          Referral (Request), 09/10/20 11:22:00 CDT, Referred to: Ophthalmology, Referred to: [facility], Oscillopsia  Nystagmus  Strabismus  History of eye surgery                Orders:         clonazePAM, = 1 tab(s) ( 1 mg ), Oral, bid, # 180 tab(s), 1 Refill(s), Type: Maintenance, Pharmacy: [id number], 1 tab(s) Oral bid, 67.5, in, 03/13/20 13:24:00 CDT, Height Measured, 190.2, lb, 09/10/20 10:39:00 CDT, Weight Measured, (Ordered)         clonazePAM, = 1 tab(s) ( 1 mg ), Oral, bid, # 180 tab(s), 1 Refill(s), Type: Maintenance, Pharmacy: [facility], 1 tab(s) Oral bid, (Completed)         omeprazole, = 1 cap(s) ( 40 mg ), Oral, daily, # 90 cap(s), 0 Refill(s), Type: Maintenance, Pharmacy: [facility], 1 cap(s) Oral daily, (Completed)         QUEtiapine, See Instructions, Instructions: 1 PO qhs x 1 week then may increase to 2 po qhs, # 60 EA, 1 Refill(s), Type: Maintenance, Pharmacy: [facility], 1 PO qhs x 1 week then may increase to 2 po qhs, (Completed)         QUEtiapine, = 1 tab(s) ( 25 mg ), Oral, qhs, # 90 tab(s), 1 Refill(s), Type: Maintenance, Pharmacy: [facility], 1 tab(s) Oral qhs, (Completed)         QUEtiapine, See Instructions, Instructions: 1 tab(s) Oral qpm x 1 day, then 2 po Qpm x 1 day, then 3 po qhs, # 90 EA, 1 Refill(s), Type: Maintenance, Pharmacy: [facility], 1 tab(s) Oral qpm x 1 day, then 2 po Qpm x 1 day, then 3 po qhs, (Completed)      25 minutes spent with patient in direct face to face contact, > 50% of time spent counseling and coordinating care.  Her mood is currently well controlled with her current medications.  We will renew her clonazepam for now and hopefully over the next 6 months she will have a chance to be evaluated and treated by neuro-ophthalmology and then she can work with me to taper off of this clonazepam in a safe way.  Due to the current pandemic she is currently lacking insurance so I am shared with her information about the [email or web address] program.  I would like her to try applying for  this so that cost does not limit her ability to get the care that she needs with a specialist that she needs and possibly some imaging.  She plans to apply for dose.  In the meantime I will place a referral so that she can get an appointment.  Patient Information     Name:ELI ECHOLS      Address:       [address], [place] [id number]     Sex:Female     YOB: 1970     [phone]     Emergency Contact:IFTIKHAR ECHOLS     MRN:[id number]     FIN:[id number]     Location:[facility]     Date of Service:09/10/2020      Primary Care Physician:       Cris Gomez MD, [phone]      Attending Physician:       Cris Gomez MD, [phone]  Problem List/Past Medical History    Ongoing     Bipolar 1 disorder     Chronic tension headache     SANTIAGO (generalized anxiety disorder)     History of eye surgery     Hypomania     Migraine headache     Obesity     Oscillopsia    Historical     Inpatient stay       Comments: @[facility], [place] - Hypomania - Bipolar 2 versus cyclothymia     Pregnancy     Pregnancy     Pregnancy     Pregnancy     Pregnancy     Pregnancy  Procedure/Surgical History      delivery NOS, unspecified (2011)     Other Postsurgical Status (2011)      section     Tonsillectomy  Medications    Claritin-D 12 Hour oral tablet, extended release, 1 tab(s), Oral, q12 hrs    clonazePAM 1 mg oral tablet, 1 mg= 1 tab(s), Oral, bid, 1 refills    LaMICtal 100 mg oral tablet, 300 mg= 3 tab(s), Oral, daily, 3 refills    omeprazole 40 mg oral delayed release capsule, 40 mg= 1 cap(s), Oral, daily, 3 refills  Allergies    Depakote (Diarrhea)    codeine  Social History    Smoking Status     Never smoker     Alcohol      Never     Employment/School      Highest education level: University degree(s).     Exercise      Exercise frequency: Daily. Exercise type: Walking dogs, elliptical.     Home/Environment      Marital  status: . Living situation: Home/Independent. Injuries/Abuse/Neglect in household: No.     Nutrition/Health      Type of diet: Regular.     Sexual      Identifies as female, Sexual orientation: Straight or heterosexual. History of STD: No. Uses condoms: No. Contraceptive Use Details: Birth control pill. History of sexual abuse: No.     Substance Abuse      Never     Tobacco      Never (less than 100 in lifetime)  Family History    Cancer of adrenal gland: Father.    Heart disease: Father.    Hypercholesterolemia: Father.    Lung disorder: Father.  Immunizations      Vaccine Date Status          influenza virus vaccine, inactivated 10/28/2019 Given          influenza 01/13/2018 Recorded          Td 06/13/2015 Recorded          influenza, H1N1, live 12/09/2009 Recorded          meningococcal conjugate vaccine 08/22/2007 Recorded          Hep B 08/22/2007 Recorded          MMR (measles/mumps/rubella) 08/22/2007 Recorded          Hep B 06/22/2007 Recorded          Td 04/12/2001 Recorded

## 2022-02-15 NOTE — NURSING NOTE
Comprehensive Intake Entered On:  3/12/2019 10:19 AM CDT    Performed On:  3/12/2019 10:11 AM CDT by Violeta Arizmendi CMA               Summary   Chief Complaint :   yearly px   Last Menstrual Period :   2019 CST   Menstrual Status :   Menarcheal   Weight Measured :   190.4 lb(Converted to: 190 lb 6 oz, 86.36 kg)    Height Measured :   67.5 in(Converted to: 5 ft 7 in, 171.45 cm)    Body Mass Index :   29.38 kg/m2 (HI)    Body Surface Area :   2.03 m2   Systolic Blood Pressure :   124 mmHg   Diastolic Blood Pressure :   80 mmHg   Mean Arterial Pressure :   95 mmHg   Peripheral Pulse Rate :   80 bpm   BP Site :   Right arm   Pulse Site :   Radial artery   BP Method :   Manual   HR Method :   Manual   Temperature Tympanic :   97.8 DegF(Converted to: 36.6 DegC)  (LOW)    Violeta Arizmendi CMA - 3/12/2019 10:11 AM CDT   Health Status   Allergies Verified? :   Yes   Medication History Verified? :   Yes   Medical History Verified? :   No   Pre-Visit Planning Status :   Completed   Tobacco Use? :   Never smoker   Violeta Arizmendi CMA - 3/12/2019 10:11 AM CDT   Demographics   Last Name :   ONESIMO   First Name :   ELI Rosa Initial :   L   Responsible Party Date of Birth () :   1970 CDT   Contact Relationship to Patient (other) :   Patient   Violeta Arizmendi CMA - 3/12/2019 10:11 AM CDT   Meds / Allergies   (As Of: 3/12/2019 10:19:49 AM CDT)   Allergies (Active)   codeine  Estimated Onset Date:   Unspecified ; Created By:   [employer] Domain User for [id number]; Reaction Status:   Active ; Substance:   codeine ; Updated By:   Generated Domain User for [id number]; Source:   Patient ; Reviewed Date:   3/12/2019 10:15 AM CDT        Medication List   (As Of: 3/12/2019 10:19:49 AM CDT)   Prescription/Discharge Order    desogestrel-ethinyl estradiol  :   desogestrel-ethinyl estradiol ; Status:   Prescribed ; Ordered As Mnemonic:   Desogen 0.15 mg-0.03 mg oral tablet ; Simple Display Line:   1 tab(s), PO, Daily, take 21 days of  active tablets then skip placebos and start the next pack, 112 tab(s), 3 Refill(s) ; Ordering Provider:   Cris Gomez MD; Catalog Code:   desogestrel-ethinyl estradiol ; Order Dt/Tm:   1/14/2019 11:44:05 AM          DULoxetine  :   DULoxetine ; Status:   Prescribed ; Ordered As Mnemonic:   DULoxetine 60 mg oral delayed release capsule ; Simple Display Line:   60 mg, 1 cap(s), Oral, daily, 90 cap(s), 1 Refill(s) ; Ordering Provider:   Cris Gomez MD; Catalog Code:   DULoxetine ; Order Dt/Tm:   1/14/2019 11:45:15 AM          oseltamivir  :   oseltamivir ; Status:   Prescribed ; Ordered As Mnemonic:   Tamiflu 75 mg oral capsule ; Simple Display Line:   75 mg, 1 cap(s), PO, BID, for 5 day(s), 10 cap(s), 0 Refill(s) ; Ordering Provider:   Luz Maria FELIZ, Yusra ANDERSON; Catalog Code:   oseltamivir ; Order Dt/Tm:   3/10/2019 9:29:54 AM

## 2022-02-15 NOTE — NURSING NOTE
CAGE Assessment Entered On:  3/13/2019 10:12 AM CDT    Performed On:  3/12/2019 10:12 AM CDT by Erica Gordillo               Assessment   Have you ever felt you should cut down on your drinking :   No   Have people annoyed you by criticizing your drinking :   No   Have you ever felt bad or guilty about your drinking :   No   Have you ever taken a drink first thing in the morning to steady your nerves or get rid of a hangover (Eye-opener) :   No   CAGE Score :   0    Erica Gordillo - 3/13/2019 10:12 AM CDT
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
## 2022-02-15 NOTE — NURSING NOTE
Comprehensive Intake Entered On:  9/10/2020 10:43 AM CDT    Performed On:  9/10/2020 10:39 AM CDT by Dinorah Moore CMA   Chief Complaint :   Med check/refills.    Menstrual Status :   Menarcheal   Weight Measured :   190.2 lb(Converted to: 190 lb 3 oz, 86.27 kg)    Height/Length Estimated :   67.5 in(Converted to: 5 ft 7 in, 171.45 cm)    Systolic Blood Pressure :   118 mmHg   Diastolic Blood Pressure :   78 mmHg   Mean Arterial Pressure :   91 mmHg   Peripheral Pulse Rate :   92 bpm   BP Site :   Right arm   BP Method :   Manual   HR Method :   Electronic   Temperature Tympanic :   98.3 DegF(Converted to: 36.8 DegC)    Oxygen Saturation :   98 %   Dinorah Moore CMA - 9/10/2020 10:39 AM CDT   Health Status   Allergies Verified? :   Yes   Medication History Verified? :   Yes   Pre-Visit Planning Status :   N/A   Tobacco Use? :   Never smoker   Dinorah Moore CMA - 9/10/2020 10:39 AM CDT   Consents   Consent for Immunization Exchange :   Consent Granted   Consent for Immunizations to Providers :   Consent Granted   Dinorah Moore CMA - 9/10/2020 10:39 AM CDT   Meds / Allergies   (As Of: 9/10/2020 10:43:52 AM CDT)   Allergies (Active)   codeine  Estimated Onset Date:   Unspecified ; Created By:   Generated Domain User for [id number]; Reaction Status:   Active ; Substance:   codeine ; Updated By:   Generated Domain User for [id number]; Source:   Patient ; Reviewed Date:   3/13/2020 1:28 PM CDT      Depakote  Estimated Onset Date:   Unspecified ; Reactions:   Diarrhea ; Comments:     Comment 1: developed severe diarrhea at high doses   ; Created By:   Cris Gomez MD; Reaction Status:   Active ; Category:   Drug ; Substance:   Depakote ; Type:   Secondary Effect ; Updated By:   Cris Gomez MD; Reviewed Date:   3/13/2020 1:28 PM CDT        Medication List   (As Of: 9/10/2020 10:43:52 AM CDT)   Prescription/Discharge Order    clonazePAM  :   clonazePAM ; Status:   Prescribed ; Ordered As  Mnemonic:   clonazePAM 1 mg oral tablet ; Simple Display Line:   1 mg, 1 tab(s), Oral, bid, 180 tab(s), 1 Refill(s) ; Ordering Provider:   Cris Gomez MD; Catalog Code:   clonazePAM ; Order Dt/Tm:   3/13/2020 1:55:38 PM CDT          lamoTRIgine  :   lamoTRIgine ; Status:   Prescribed ; Ordered As Mnemonic:   LaMICtal 100 mg oral tablet ; Simple Display Line:   300 mg, 3 tab(s), Oral, daily, 270 tab(s), 3 Refill(s) ; Ordering Provider:   Cris Gomez MD; Catalog Code:   lamoTRIgine ; Order Dt/Tm:   3/13/2020 1:57:43 PM CDT          omeprazole  :   omeprazole ; Status:   Prescribed ; Ordered As Mnemonic:   omeprazole 40 mg oral delayed release capsule ; Simple Display Line:   40 mg, 1 cap(s), Oral, daily, 90 cap(s), 3 Refill(s) ; Ordering Provider:   Cris Gomez MD; Catalog Code:   omeprazole ; Order Dt/Tm:   3/13/2020 1:52:47 PM CDT            Home Meds    loratadine-pseudoephedrine  :   loratadine-pseudoephedrine ; Status:   Documented ; Ordered As Mnemonic:   Claritin-D 12 Hour oral tablet, extended release ; Simple Display Line:   1 tab(s), Oral, q12 hrs, 0 Refill(s) ; Catalog Code:   loratadine-pseudoephedrine ; Order Dt/Tm:   1/31/2020 8:30:45 AM CST            ID Risk Screen   Recent Travel History :   No recent travel   Family Member Travel History :   No recent travel   Other Exposure to Infectious Disease :   Unknown   Treasure1 Dinorah SIMS - 9/10/2020 10:39 AM CDT

## 2022-02-15 NOTE — LETTER
(Inserted Image. Unable to display)       [address] [place] [address]    July 05, 2021        ELI ECHOLS  [address] [place] [id number]        Dear ELI,      Thank you for selecting [facility] for your healthcare needs.       Normal MRI brain. Please schedule an appointment.          Please contact me or my assistant at [phone] you have any questions or concerns.     Sincerely,        Дмитрий Umaña MD

## 2022-02-15 NOTE — TELEPHONE ENCOUNTER
---------------------  From: Dania Hines (Appointment Pool ([id number]))   To: ONESIMOVIKTORIYAELI L    Sent: 10/17/2021 9:25:18 AM CDT  Subject: RE: Appointment Request     Good Morning Eli,  I have you scheduled with Dr. Umaña on 2021 at 10:40am.  If this date and time does not work for you please let us know.  Thank you,  Dania       ---------------------  From: ELI ECHOLS  To: [facility]  Sent: 10/15/2021 09:20 a.m. CDT  Subject: Appointment Request  Patient Name: ELI ECHOLS  Patient : 1970  Preferred Provider: Finesse  Appointment Dates: First Available Appointment  Preferred Days: Monday,Tuesday,Wednesday,Thursday,Friday  Preferred Time: Anytime  Contact Patient by: Secure Message    Reason for Visit:    The need for follow up for visit to [place] on  was not addressed during 2021 appointment with Dr. Kilpatrick.    A colonoscopy is scheduled for  at [facility].

## 2022-02-15 NOTE — PROGRESS NOTES
Chief Complaint    med check- lexapro. HTN check.  History of Present Illness      Patient presents to clinic today with reports of dry mouth increased agitation, racing thoughts, difficulty sleeping, she gets sweaty, she reports that the pain is worsened as she decreased her duloxetine and decreased her quetiapine and increased her Lexapro.  She is currently on Lexapro 10 mg daily.  She is also taking 12-1/2-25 mg of quetiapine at bedtime.  Review of Systems      Positive for palpitations, no suicidal ideation, she feels anxious, she is a mild headache, no fevers chills, little bit of nausea, no vomiting no diarrhea no constipation remainder of 12 point review of systems is negative.  Physical Exam   Vitals & Measurements    T: 98.1   F (Tympanic)  HR: 98(Peripheral)  BP: 170/88  SpO2: 98%     WT: 194.8 lb       General the patient seems agitated, she is mildly diaphoretic,      HEENT pupils are equally round and reactive to light, extraocular motions intact, hearing is grossly normal, nares are patent no rhinorrhea, mucous membranes seem a little dry       Neck is supple there is no lymphadenopathy       Lungs are clear to auscultation bilaterally without wheezes rhonchi or rails       Chest is clear to auscultation bilaterally no wheezes rhonchi rales       Cardiovascular rapid rate regular rhythm no murmurs gallops rubs       Musculoskeletal patient is moving all 4 extremities, her gait is normal       Neuro cranial nerves II through XII are grossly intact, her patella reflex on the left is hyper reflexive and her left foot has 2 beats of clonus her right patellar reflex is normal and she has no clonus on the right her bilateral biceps and triceps reflexes are normal.  Assessment/Plan       Ordered:         Referral (Request), 04/17/19 11:28:00 CDT, Referred to: Psychiatry, Hypomania         Return to Clinic (Request), Return in 2 weeks         Return to Clinic (Request), Return in 1 week      Patient with a  history of anxiety now with elevated blood pressure, dry mouth, mild diaphoresis.  Concern for possible serotonin syndrome.  Patient did take her last 10 mg dose of Lexapro this morning.  Given this setting it seems most prudent to have her admitted for observation overnight.  Spoke with Dr. Kilpatrick, hospitalist, who accepted care of patient [facility] today.  Her quetiapine and her Lexapro.  Also have to consider that this may be some hypomania.  We will be happy to see patient in follow-up after she is stabilized.  45 minutes spent with patient in direct face-to-face contact of which greater than 50% of the time was spent counseling patient and coordinating care.  Patient Information     Name:ELI ECHOLS      Address:      [address] [address]     Sex:Female     YOB: 1970     [phone]     MRN:[id number]     FIN:[id number]     Location:[facility]     Date of Service:04/10/2019      Primary Care Physician:       NONE ,       Attending Physician:       Jason THOMAS [facility], [phone]  Problem List/Past Medical History    Ongoing     Chronic tension headache     SANTIAGO (generalized anxiety disorder)     Hypomania     Migraine headache     Obesity    Historical     Inpatient stay       Comments: @[facility], [place] - Hypomania - Bipolar 2 versus cyclothymia     Pregnancy     Pregnancy     Pregnancy     Pregnancy     Pregnancy     Pregnancy  Procedure/Surgical History      delivery NOS, unspecified (2011)     Other Postsurgical Status (2011)      section     Tonsillectomy  Medications        QUEtiapine 25 mg oral tablet: 1/2-1 tab(s), Oral, qhs, 30 EA, 1 Refill(s).        Desogen 0.15 mg-0.03 mg oral tablet: 1 tab(s), PO, Daily, take 21 days of active tablets then skip placebos and start the next pack, 90 tab(s), 3 Refill(s).        QUEtiapine 25 mg oral tablet: 1/2-1.5 tab(s), Oral, qhs, 45 EA, 1  Refill(s).        Lexapro 10 mg oral tablet: 10 mg, 1 tab(s), PO, Daily, 30 tab(s), 1 Refill(s).        LORazepam 1 mg oral tablet: 1 mg, 1 tab(s), Oral, tid, PRN: anxiety, 14 tab(s), 0 Refill(s).        clonazePAM 1 mg oral tablet: 1 mg, 1 tab(s), Oral, bid, 0 Refill(s).        Depakote  mg oral tablet, extended release: 250 mg, 1 tab(s), Oral, daily, 30 tab(s), 1 Refill(s).         Allergies    codeine  Social History    Smoking Status - 04/17/2019     Never smoker     Alcohol      Never, 03/13/2019     Employment and Education      Highest education level: University degree(s)., 03/14/2019     Exercise and Physical Activity      Exercise frequency: Daily. Exercise type: Walking dogs, elliptical., 03/14/2019     Home and Environment      Marital status: . Living situation: Home/Independent. Injuries/Abuse/Neglect in household: No., 03/14/2019     Nutrition and Health      Type of diet: Regular., 03/14/2019     Sexual      Identifies as female, Sexual orientation: Straight or heterosexual. History of STD: No. Uses condoms: No. Contraceptive Use Details: Birth control pill. History of sexual abuse: No., 03/14/2019     Substance Abuse      Never, 03/14/2019     Tobacco      Never (less than 100 in lifetime), 03/14/2019  Family History    Cancer of adrenal gland: Father.    Heart disease: Father.    Hypercholesterolemia: Father.    Lung disorder: Father.  Immunizations      Vaccine Date Status      influenza 01/13/2018 Recorded      Td 06/13/2015 Recorded      meningococcal conjugate vaccine 08/22/2007 Recorded      Hep B 08/22/2007 Recorded      MMR (measles/mumps/rubella) 08/22/2007 Recorded      Hep B 06/22/2007 Recorded  Lab Results          Lab Results (Last 4 results within 90 days)           Sodium Level: 137 mmol/L [135 mmol/L - 146 mmol/L] (03/04/19 08:33:00)          Potassium Level: 4 mmol/L [3.5 mmol/L - 5.3 mmol/L] (03/04/19 08:33:00)          Chloride Level: 103 mmol/L [98 mmol/L - 110  mmol/L] (03/04/19 08:33:00)          CO2 Level: 23 mmol/L [20 mmol/L - 32 mmol/L] (03/04/19 08:33:00)          Glucose Level: 85 mg/dL [65 mg/dL - 99 mg/dL] (03/04/19 08:33:00)          BUN: 18 mg/dL [7 mg/dL - 25 mg/dL] (03/04/19 08:33:00)          Creatinine Level: 0.84 mg/dL [0.5 mg/dL - 1.1 mg/dL] (03/04/19 08:33:00)          BUN/Creat Ratio: NOT APPLICABLE [6  - 22] (03/04/19 08:33:00)          eGFR: 83 mL/min/1.73m2 (03/04/19 08:33:00)          eGFR : 96 mL/min/1.73m2 (03/04/19 08:33:00)          Calcium Level: 9.4 mg/dL [8.6 mg/dL - 10.2 mg/dL] (03/04/19 08:33:00)          Hgb A1c: 5.2 (03/04/19 08:33:00)          Cholesterol: 181 mg/dL (03/04/19 08:33:00)          Non-HDL Cholesterol: 103 (03/04/19 08:33:00)          HDL: 78 mg/dL (03/04/19 08:33:00)          Cholesterol/HDL Ratio: 2.3 (03/04/19 08:33:00)          LDL: 84 (03/04/19 08:33:00)          Triglyceride: 101 mg/dL (03/04/19 08:33:00)          TSH: 1.39 mIU/L (03/04/19 08:33:00)          HPV High Risk: NOT DETECTED (03/12/19 12:49:00)

## 2022-02-15 NOTE — PROGRESS NOTES
Chief Complaint    yearly px  History of Present Illness      Pt here today for annual exam             She is also had numerous right eye surgeries.  She reports at one point she had had Botox injections done on to her right eye as part of a trial.  She continues to get headaches that seem to be better right in her eye.  We do not have any records from her previous eye surgeries or these injections.  Suggested the patient that she try to get copies of these for us and also get a copy of the records for herself and then perhaps we could try to get her into see the neuro-ophthalmologist.  He had a hard time trying to find a neuro-ophthalmologist in network.  I was able to find a listing of 6 different neuro-ophthalmologist in the [facility] area.  1 of them was associated with [facility] in March.      She is due for Pap smear today.      She has no family history of breast cancer and prefers to wait until age 51 to start doing mammograms.  She has no family history of colon cancer.  Prefers to wait until 51 to start colon cancer screening.      She has daytime hypersomnolence.  Also see completed sleep study order form.  Recommended home sleep study       Review of systems is negative except as per HPI including no fevers, chills, sore throat, runny nose, nausea, vomiting, constipation, diarrhea, rash or new skin lesions, chest pain, palpitations, slurred speech, new paresthesia, shortness of breath or wheeze.             Exam:      see vitals listed below      General: alert and oriented ×3 no acute distress.      HEENT: Normocephalic and atraumatic.       Eyes pupils are equal round and reactive to light extraocular motion is intact. normal conjunctiva      Hearing is grossly normal and there is no otorrhea. Tympanic membranes are pearly grey with a normal light reflex.      Nares are patent there is no rhinorrhea.       Mucous membranes are moist and pink.      Chest: has bilateral rise with no increased work of breathing.  clear to auscultation without wheezes, rhonchi, or rales.      Cardiovascular: normal perfusion and brisk capillary refill. S1S2 with regular rate and rhythm and no murmurs, gallops or rubs.      Musculoskeletal: no gross focal abnormalities and normal gait.      Neuro: no gross focal abnormalities and memory seems intact.  CN 2-12 are grossly intact.      Psychiatric: speech is clear and coherent and fluent. Patient dressed appropriately for the weather. Mood is appropriate and affect is full.      Abd: no rebound or guarding, normal BS      Skin: no rash or lesion sidentified       normal external female genitalia normal physiological vaginal secretions no cervical motion tenderness no adnexal pain or tenderness Pap smear specimen obtained.      Discussed:      using sunscreen, protecting from sunburn,      refer to usdachooseplate.gov, AHA and ADA for diet and exercise recommendations      consume [id number] mg calcium daily      std screening declined.      regular self skin checks      get 150min /week of aerobic exercise  Physical Exam   Vitals & Measurements    T: 97.8   F (Tympanic)  HR: 80(Peripheral)  BP: 124/80     HT: 67.5 in  WT: 190.4 lb  BMI: 29.38   Assessment/Plan       Generalized anxiety disorder (F41.1)         Ordered:          QUEtiapine, 1/2-1 tab(s), Oral, qhs, # 30 EA, 1 Refill(s), Type: Maintenance, Pharmacy: [id number], 1/2-1 tab(s) Oral qhs, (Ordered)                Hypersomnolence (G47.10)        We will order home sleep study.                Screening for cervical cancer (Z12.4)         Ordered:          HPV DNA, High Risk with Reflex to Genotypes 16,18* ([facility]), Specimen Type: Pap, Collection Date: 03/12/19 10:52:00 CDT          Thinprep Pap* ([facility]), Specimen Type: Pap, Collection Date: 03/12/19 10:52:00 CDT                Orders:         desogestrel-ethinyl estradiol, 1 tab(s), PO, Daily, Instructions: take 21 days of active tablets then skip placebos and start the  next pack, # 112 tab(s), 3 Refill(s), Type: Hard Stop, Pharmacy: [id number], (Completed)         desogestrel-ethinyl estradiol, 1 tab(s), PO, Daily, Instructions: take 21 days of active tablets then skip placebos and start the next pack, # 90 tab(s), 3 Refill(s), Type: Maintenance, Pharmacy: [id number], 1 tab(s) Oral daily,Instr:take 21 days of active tablets t..., (Ordered)         DULoxetine, = 1 cap(s) ( 60 mg ), Oral, daily, # 90 cap(s), 1 Refill(s), Type: Hard Stop, Pharmacy: [facility], (Completed)         DULoxetine, = 1 cap(s) ( 60 mg ), Oral, daily, # 90 cap(s), 3 Refill(s), Type: Maintenance, Pharmacy: [id number], 1 cap(s) Oral daily, (Ordered)  Patient Information     Name:ELI ECHOLS      Address:      [address] [address]     Sex:Female     YOB: 1970     [phone]     MRN:[id number]     FIN:[id number]     Location:[facility]     Date of Service:2019      Primary Care Physician:       NONE ,       Attending Physician:       Cris Gomez MD, [phone]  Problem List/Past Medical History    Ongoing     Chronic tension headache     Migraine headache     Obesity    Historical     Pregnancy  Procedure/Surgical History      delivery NOS, unspecified (2011)           Other Postsurgical Status (2011)         multiple eye surgeries  Medications     Tamiflu 75 mg oral capsule: 75 mg, 1 cap(s), PO, BID, for 5 day(s), 10 cap(s), 0 Refill(s).     QUEtiapine 25 mg oral tablet: 1/2-1 tab(s), Oral, qhs, 30 EA, 1 Refill(s).     Desogen 0.15 mg-0.03 mg oral tablet: 1 tab(s), PO, Daily, take 21 days of active tablets then skip placebos and start the next pack, 90 tab(s), 3 Refill(s).     DULoxetine 60 mg oral delayed release capsule: 60 mg, 1 cap(s), Oral, daily, 90 cap(s), 3 Refill(s).          Allergies    codeine  Social History    Smoking Status -  03/12/2019     Never smoker     Alcohol      Never, 01/15/2019   never tobacco, no DOA, , went to college,  Family History   adrenal cancer, allergies, asthma, DM, heart disease, high cholesterol lung DZ  Lab Results       Lab Results (Last 4 results within 90 days)        Sodium Level: 137 mmol/L (03/04/19 08:33:00)       Potassium Level: 4 mmol/L (03/04/19 08:33:00)       Chloride Level: 103 mmol/L (03/04/19 08:33:00)       CO2 Level: 23 mmol/L (03/04/19 08:33:00)       Glucose Level: 85 mg/dL (03/04/19 08:33:00)       BUN: 18 mg/dL (03/04/19 08:33:00)       Creatinine Level: 0.84 mg/dL (03/04/19 08:33:00)       BUN/Creat Ratio: NOT APPLICABLE (03/04/19 08:33:00)       eGFR: 83 mL/min/1.73m2 (03/04/19 08:33:00)       eGFR : 96 mL/min/1.73m2 (03/04/19 08:33:00)       Calcium Level: 9.4 mg/dL (03/04/19 08:33:00)       Hgb A1c: 5.2 (03/04/19 08:33:00)       Cholesterol: 181 mg/dL (03/04/19 08:33:00)       Non-HDL Cholesterol: 103 (03/04/19 08:33:00)       HDL: 78 mg/dL (03/04/19 08:33:00)       Cholesterol/HDL Ratio: 2.3 (03/04/19 08:33:00)       LDL: 84 (03/04/19 08:33:00)       Triglyceride: 101 mg/dL (03/04/19 08:33:00)       TSH: 1.39 mIU/L (03/04/19 08:33:00)       YUMIKO IFA: NEGATIVE (01/14/19 11:59:00)       SSA (Ro): <1.0 NEG (01/14/19 11:59:00)       SSB (La): <1.0 NEG (01/14/19 11:59:00)       Rheumatoid Factor: <14 (01/14/19 11:59:00)

## 2022-02-15 NOTE — PROGRESS NOTES
Chief Complaint  Patient presents for medication follow up.  History of Present Illness  patient present to clinic today for follow up mood  also see charlie 7 and Phq 9  she has been seeing psychiatrist with [facility], they have stopped her depakote because at higher doses it caused diarrhea, now on lamictal, says it has been great for her.  she is not sure how helpful the seroquel was, it was causing a lot of heartburn so it was switched to zyprexa, this seems to b getting her more irritable and wound up and she is having more diffculty woth insomnia.  would like to try coming off of the zyprexa, seeing how her mood is, if she needs to get back on the seroquel she will but will also switch from famitodine to a ppi.  she thinks that when she can get into see her psychaitrist he is helpful bu t that it is very difficult to get in to see him if she has any concerns between appts and it is hard to get in to see him due to his lack of availability  Review of systems is negative except as per HPI including:  no fevers, chills, sore throat, runny nose, nausea, vomiting, constipation, diarrhea, rash or new skin lesions, chest pain, palpitations, slurred speech, new paresthesia, shortness of breath or wheeze.  no SI or HI, no a/v hallucinations  Exam:  General: alert and oriented ×3 no acute distress.  HEENT: pupils are equal round and reactive to light extraocular motion is intact. Normocephalic and atraumatic.   Hearing is grossly normal and there is no otorrhea.   Nares are patent there is no rhinorrhea.   Mucous membranes are moist and pink.  Chest: has bilateral rise with no increased work of breathing.  Cardiovascular: normal perfusion and brisk capillary refill.  Musculoskeletal: no gross focal abnormalities and normal gait.  Neuro: no gross focal abnormalities and memory seems intact.  Psychiatric: speech is clear and coherent and fluent. Patient dressed appropriately for the weather. Mood is appropriate and affect  is full.  good insight and judgement         Discussed with patient to return to clinic if symptoms worsen or do not improve.  Physical Exam     Vitals & Measurements    T: 98.0   F (Tympanic)  HR: 79(Peripheral)  BP: 120/72  SpO2: 7%     WT: 198 lb   Assessment/Plan  Bipolar illness (F31.9)      Ordered:  34079 office outpatient visit 25 minutes (Charge), Quantity: 1, Bipolar illness  GERD with apnea     GERD with apnea (K21.9)      Ordered:  48136 office outpatient visit 25 minutes (Charge), Quantity: 1, Bipolar illness  GERD with apnea     25 minutes spent with patient in direct face to face contact, > 50% of time spent counseling and coordinating care.  we will mke med changes as noted above in HPI  Patient Information  Name:  ELI ECHOLS  Address:   [address] [id number]  Sex: Female  YOB: 1970  Phone: [phone]  Emergency Contact: IFTIKHAR ECHOLS  MRN: [id number]  [facility]: [id number]  Location: [facility]  Date of Service: 2020  Primary Care Physician:   Cris Gomez MD, [phone]  Attending Physician:   Cris Gomez MD, [phone]  Problem List/Past Medical History  Ongoing   Chronic tension headache   SANTIAGO (generalized anxiety disorder)   Hypomania   Migraine headache   Obesity  Historical    Inpatient stay    Comments: @[facility], [place] - Hypomania - Bipolar 2 versus cyclothymia    Pregnancy    Pregnancy    Pregnancy    Pregnancy    Pregnancy    Pregnancy  Procedure/Surgical History     delivery NOS, unspecified (2011)          Other Postsurgical Status (2011)           section          Tonsillectomy        Medications   Claritin-D 12 Hour oral tablet, extended release, 1 tab(s), Oral, q12 hrs   clonazePAM 1 mg oral tablet, 1 mg= 1 tab(s), Oral, bid   LaMICtal 100 mg oral tablet, 300 mg= 3 tab(s), Oral, daily   omeprazole 40 mg oral delayed release capsule, 40 mg= 1 cap(s), Oral,  daily   QUEtiapine 25 mg oral tablet, See Instructions, 1 refills   QUEtiapine 50 mg oral tablet, extended release, See Instructions, 1 refills,   Not taking  Allergies  Depakote (Diarrhea)  codeine  Social History   Smoking Status - 01/31/2020    Never smoker  Alcohol   Never, 03/13/2019  Employment/School   Highest education level: University degree(s)., 03/14/2019  Exercise   Exercise frequency: Daily. Exercise type: Walking dogs, elliptical., 03/14/2019  Home/Environment   Marital status: . Living situation: Home/Independent. Injuries/Abuse/Neglect in household: No., 03/14/2019  Nutrition/Health   Type of diet: Regular., 03/14/2019  Sexual   Identifies as female, Sexual orientation: Straight or heterosexual. History of STD: No. Uses condoms: No. Contraceptive Use Details: Birth control pill. History of sexual abuse: No., 03/14/2019  Substance Abuse   Never, 03/14/2019  Tobacco   Never (less than 100 in lifetime), 03/14/2019  Family History   Cancer of adrenal gland: Father.   Heart disease: Father.   Hypercholesterolemia: Father.   Lung disorder: Father.  Immunizations       Vaccine       Date       Status           influenza virus vaccine, inactivated           10/28/2019           Given           influenza           01/13/2018           Recorded           Td           06/13/2015           Recorded           influenza, H1N1, live           12/09/2009           Recorded           meningococcal conjugate vaccine           08/22/2007           Recorded           Hep B           08/22/2007           Recorded           MMR (measles/mumps/rubella)           08/22/2007           Recorded           Hep B           06/22/2007           Recorded           Td           04/12/2001           Recorded

## 2022-02-15 NOTE — NURSING NOTE
CAGE Assessment Entered On:  1/15/2019 5:34 PM CST    Performed On:  1/14/2019 5:34 PM CST by Erica Gordillo               Assessment   Have you ever felt you should cut down on your drinking :   No   Have people annoyed you by criticizing your drinking :   No   Have you ever felt bad or guilty about your drinking :   No   Have you ever taken a drink first thing in the morning to steady your nerves or get rid of a hangover (Eye-opener) :   No   CAGE Score :   0    Erica Gordillo - 1/15/2019 5:34 PM CST

## 2022-02-15 NOTE — PROGRESS NOTES
Chief Complaint    f/u anxiety.  also needs referral to outpatient psychologist/psychiatry.  History of Present Illness      Discussed with patient that the mood disorder questionnaire has 70% sensitivity and a 90% specificity.  We also discussed adding quetiapine which she had voiced before she is actually found helpful for her.  She recalls one day where she can 75 mg of the immediate release at bedtime and 30 mg of duloxetine and 5 mg of Lexapro and actually woke up in the morning feeling calm.  We could either start quetiapine but recommended this time trying the 50 mg of the extended release and then titrating it up to 150 mg if she tolerates it.  Another option would be to increase the Depakote again.  He decided that he would like to try increasing the quetiapine.  He will return to clinic in 1 week for follow-up sooner if needed.  He is also found a psychiatrist that he would like to establish care with.  We reprinted the referral to psychiatry for patient continue with our referral coordinators today to try to establish a new patient appointment.  Patient present to clinic today for follow up mood      also see charlie 7 and Phq 9      Patient's mood disorder questionnaire is positive today.  He scanned in information for further details.      She is tolerating her Depakote at 250 mg however she has continued to need the clonazepam 1 mg 1 p.o. twice daily.      Review of systems is negative except as per HPI including:  no fevers, chills, sore throat, runny nose, nausea, vomiting, constipation, diarrhea, rash or new skin lesions, chest pain, palpitations, slurred speech, new paresthesia, shortness of breath or wheeze.  no SI or HI, no a/v hallucinations      Exam:      General: alert and oriented ×3 no acute distress.      HEENT: pupils are equal round and reactive to light extraocular motion is intact. Normocephalic and atraumatic.       Hearing is grossly normal and there is no otorrhea.       Nares are  patent there is no rhinorrhea.       Mucous membranes are moist and pink.      Chest: has bilateral rise with no increased work of breathing.      Cardiovascular: normal perfusion and brisk capillary refill.      Musculoskeletal: no gross focal abnormalities and normal gait.      Neuro: no gross focal abnormalities and memory seems intact.      Psychiatric: speech is clear and coherent and fluent. Patient dressed appropriately for the weather. Mood is anxious and affect is anxious                     Discussed with patient to return to clinic if symptoms worsen or do not improve.  Physical Exam   Vitals & Measurements    T: 98.8   F (Tympanic)  HR: 92(Peripheral)  BP: 124/70     HT: 67.5 in  WT: 194.6 lb  BMI: 30.03   Assessment/Plan   Hypomania     15 minutes spent with patient in direct face to face contact, > 50% of time spent counseling and coordinating care.   Patient Information     Name:ELI ECHOLS      Address:      [address] [id number]     Sex:Female     YOB: 1970     [phone]     MRN:[id number]     [facility]:[id number]     Location:[facility]     Date of Service:2019      Primary Care Physician:       NONE ,       Attending Physician:       Aydin Gomez MDica, [phone]  Problem List/Past Medical History    Ongoing     Chronic tension headache     SANTIAGO (generalized anxiety disorder)     Hypomania     Migraine headache     Obesity    Historical     Inpatient stay       Comments: @[facility], [place] - Hypomania - Bipolar 2 versus cyclothymia     Pregnancy     Pregnancy     Pregnancy     Pregnancy     Pregnancy     Pregnancy  Procedure/Surgical History      delivery NOS, unspecified (2011)           Other Postsurgical Status (2011)            section           Tonsillectomy        Medications     clonazePAM 1 mg oral tablet: 1 mg, 1 tab(s), Oral, bid, 0 Refill(s).     Depakote  mg oral  tablet, extended release: 250 mg, 1 tab(s), Oral, daily, 30 tab(s), 1 Refill(s).     QUEtiapine 50 mg oral tablet, extended release: See Instructions, 1 tab(s) Oral qpm x 1 day, then 2 po Qpm x 1 day, then 3 po qhs, 90 EA, 1 Refill(s).          Allergies    codeine  Social History    Smoking Status - 04/22/2019     Never smoker     Alcohol      Never, 03/13/2019     Employment and Education      Highest education level: University degree(s)., 03/14/2019     Exercise and Physical Activity      Exercise frequency: Daily. Exercise type: Walking dogs, elliptical., 03/14/2019     Home and Environment      Marital status: . Living situation: Home/Independent. Injuries/Abuse/Neglect in household: No., 03/14/2019     Nutrition and Health      Type of diet: Regular., 03/14/2019     Sexual      Identifies as female, Sexual orientation: Straight or heterosexual. History of STD: No. Uses condoms: No. Contraceptive Use Details: Birth control pill. History of sexual abuse: No., 03/14/2019     Substance Abuse      Never, 03/14/2019     Tobacco      Never (less than 100 in lifetime), 03/14/2019  Family History    Cancer of adrenal gland: Father.    Heart disease: Father.    Hypercholesterolemia: Father.    Lung disorder: Father.  Immunizations      Vaccine Date Status      influenza 01/13/2018 Recorded      Td 06/13/2015 Recorded      meningococcal conjugate vaccine 08/22/2007 Recorded      Hep B 08/22/2007 Recorded      MMR (measles/mumps/rubella) 08/22/2007 Recorded      Hep B 06/22/2007 Recorded  Lab Results       Lab Results (Last 4 results within 90 days)        Sodium Level: 137 mmol/L [135 mmol/L - 146 mmol/L] (03/04/19 08:33:00)       Potassium Level: 4 mmol/L [3.5 mmol/L - 5.3 mmol/L] (03/04/19 08:33:00)       Chloride Level: 103 mmol/L [98 mmol/L - 110 mmol/L] (03/04/19 08:33:00)       CO2 Level: 23 mmol/L [20 mmol/L - 32 mmol/L] (03/04/19 08:33:00)       Glucose Level: 85 mg/dL [65 mg/dL - 99 mg/dL] (03/04/19  08:33:00)       BUN: 18 mg/dL [7 mg/dL - 25 mg/dL] (03/04/19 08:33:00)       Creatinine Level: 0.84 mg/dL [0.5 mg/dL - 1.1 mg/dL] (03/04/19 08:33:00)       BUN/Creat Ratio: NOT APPLICABLE [6  - 22] (03/04/19 08:33:00)       eGFR: 83 mL/min/1.73m2 (03/04/19 08:33:00)       eGFR : 96 mL/min/1.73m2 (03/04/19 08:33:00)       Calcium Level: 9.4 mg/dL [8.6 mg/dL - 10.2 mg/dL] (03/04/19 08:33:00)       Hgb A1c: 5.2 (03/04/19 08:33:00)       Cholesterol: 181 mg/dL (03/04/19 08:33:00)       Non-HDL Cholesterol: 103 (03/04/19 08:33:00)       HDL: 78 mg/dL (03/04/19 08:33:00)       Cholesterol/HDL Ratio: 2.3 (03/04/19 08:33:00)       LDL: 84 (03/04/19 08:33:00)       Triglyceride: 101 mg/dL (03/04/19 08:33:00)       TSH: 1.39 mIU/L (03/04/19 08:33:00)       HPV High Risk: NOT DETECTED (03/12/19 12:49:00)

## 2022-02-21 ENCOUNTER — OFFICE VISIT - RIVER FALLS (OUTPATIENT)
Dept: FAMILY MEDICINE | Facility: CLINIC | Age: 52
End: 2022-02-21

## 2022-02-25 ENCOUNTER — TRANSFERRED RECORDS (OUTPATIENT)
Dept: HEALTH INFORMATION MANAGEMENT | Facility: CLINIC | Age: 52
End: 2022-02-25

## 2022-03-02 VITALS
DIASTOLIC BLOOD PRESSURE: 69 MMHG | SYSTOLIC BLOOD PRESSURE: 109 MMHG | HEART RATE: 78 BPM | WEIGHT: 197 LBS | TEMPERATURE: 97.9 F | HEIGHT: 67 IN | BODY MASS INDEX: 30.92 KG/M2
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
## 2022-03-14 ENCOUNTER — TRANSFERRED RECORDS (OUTPATIENT)
Dept: HEALTH INFORMATION MANAGEMENT | Facility: CLINIC | Age: 52
End: 2022-03-14

## 2022-03-24 ENCOUNTER — TRANSFERRED RECORDS (OUTPATIENT)
Dept: HEALTH INFORMATION MANAGEMENT | Facility: CLINIC | Age: 52
End: 2022-03-24

## 2022-03-25 ENCOUNTER — TRANSCRIBE ORDERS (OUTPATIENT)
Dept: OTHER | Age: 52
End: 2022-03-25

## 2022-03-25 DIAGNOSIS — H53.2 DIPLOPIA: Primary | ICD-10-CM

## 2022-03-31 ENCOUNTER — TRANSFERRED RECORDS (OUTPATIENT)
Dept: HEALTH INFORMATION MANAGEMENT | Facility: CLINIC | Age: 52
End: 2022-03-31

## 2022-04-21 ENCOUNTER — ALLIED HEALTH/NURSE VISIT (OUTPATIENT)
Dept: FAMILY MEDICINE | Facility: CLINIC | Age: 52
End: 2022-04-21

## 2022-04-21 DIAGNOSIS — Z23 NEED FOR VACCINATION: Primary | ICD-10-CM

## 2022-04-21 PROCEDURE — 0064A COVID-19,PF,MODERNA (18+ YRS BOOSTER .25ML): CPT | Performed by: FAMILY MEDICINE

## 2022-04-21 PROCEDURE — 91306 COVID-19,PF,MODERNA (18+ YRS BOOSTER .25ML): CPT | Performed by: FAMILY MEDICINE

## 2022-04-21 PROCEDURE — 99207 PR NO CHARGE NURSE ONLY: CPT | Performed by: FAMILY MEDICINE

## 2022-05-04 ENCOUNTER — OFFICE VISIT (OUTPATIENT)
Dept: OPHTHALMOLOGY | Facility: CLINIC | Age: 52
End: 2022-05-04
Attending: OPHTHALMOLOGY

## 2022-05-04 DIAGNOSIS — H53.10 SUBJECTIVE VISUAL DISTURBANCE: ICD-10-CM

## 2022-05-04 DIAGNOSIS — H50.05 ESOTROPIA, ALTERNATING: Primary | ICD-10-CM

## 2022-05-04 DIAGNOSIS — H50.22 HYPERTROPIA OF LEFT EYE: ICD-10-CM

## 2022-05-04 DIAGNOSIS — H53.2 DIPLOPIA: ICD-10-CM

## 2022-05-04 DIAGNOSIS — H53.2 DOUBLE VISION: ICD-10-CM

## 2022-05-04 PROCEDURE — 92060 SENSORIMOTOR EXAMINATION: CPT | Performed by: OPHTHALMOLOGY

## 2022-05-04 PROCEDURE — G0463 HOSPITAL OUTPT CLINIC VISIT: HCPCS | Performed by: TECHNICIAN/TECHNOLOGIST

## 2022-05-04 PROCEDURE — 99205 OFFICE O/P NEW HI 60 MIN: CPT | Performed by: OPHTHALMOLOGY

## 2022-05-04 RX ORDER — DEXTROAMPHETAMINE SACCHARATE, AMPHETAMINE ASPARTATE, DEXTROAMPHETAMINE SULFATE AND AMPHETAMINE SULFATE 2.5; 2.5; 2.5; 2.5 MG/1; MG/1; MG/1; MG/1
20 TABLET ORAL 2 TIMES DAILY
COMMUNITY
End: 2022-06-24

## 2022-05-04 RX ORDER — LAMOTRIGINE 100 MG/1
200 TABLET ORAL DAILY
COMMUNITY
Start: 2019-06-01 | End: 2022-11-10

## 2022-05-04 RX ORDER — MULTIVITAMIN WITH IRON
1 TABLET ORAL DAILY
COMMUNITY
End: 2023-03-14

## 2022-05-04 RX ORDER — QUETIAPINE FUMARATE 25 MG/1
25 TABLET, FILM COATED ORAL AT BEDTIME
COMMUNITY
Start: 2019-04-18 | End: 2022-11-10

## 2022-05-04 RX ORDER — CLONAZEPAM 1 MG/1
1 TABLET ORAL
COMMUNITY
Start: 2019-04-18 | End: 2023-01-09

## 2022-05-04 ASSESSMENT — TONOMETRY
OS_IOP_MMHG: 10
IOP_METHOD: ICARE
OD_IOP_MMHG: 11

## 2022-05-04 ASSESSMENT — CONF VISUAL FIELD
OD_NORMAL: 1
OS_NORMAL: 1
METHOD: COUNTING FINGERS

## 2022-05-04 ASSESSMENT — REFRACTION_WEARINGRX
OD_AXIS: 084
OS_CYLINDER: +0.25
OS_AXIS: 005
OD_CYLINDER: +0.25
SPECS_TYPE: READING
OS_SPHERE: +1.50
OS_CYLINDER: +0.25
OS_AXIS: 005
OD_SPHERE: +2.25
OD_CYLINDER: +0.25
OD_AXIS: 084
OD_SPHERE: +0.50
OS_SPHERE: +3.75
SPECS_TYPE: DISTANCE

## 2022-05-04 ASSESSMENT — VISUAL ACUITY
OD_SC+: -1
OS_PH_SC: 20/25
METHOD: SNELLEN - LINEAR
OD_SC: 20/20
OS_SC: 20/40

## 2022-05-04 ASSESSMENT — SLIT LAMP EXAM - LIDS
COMMENTS: NORMAL
COMMENTS: NORMAL

## 2022-05-04 ASSESSMENT — EXTERNAL EXAM - LEFT EYE: OS_EXAM: NORMAL

## 2022-05-04 ASSESSMENT — EXTERNAL EXAM - RIGHT EYE: OD_EXAM: NORMAL

## 2022-05-04 ASSESSMENT — CUP TO DISC RATIO
OS_RATIO: 0.2
OD_RATIO: 0.25
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
## 2022-05-13 ENCOUNTER — OFFICE VISIT (OUTPATIENT)
Dept: FAMILY MEDICINE | Facility: CLINIC | Age: 52
End: 2022-05-13

## 2022-05-13 VITALS
TEMPERATURE: 98 F | HEART RATE: 86 BPM | SYSTOLIC BLOOD PRESSURE: 110 MMHG | DIASTOLIC BLOOD PRESSURE: 78 MMHG | OXYGEN SATURATION: 98 % | WEIGHT: 183.4 LBS | BODY MASS INDEX: 28.72 KG/M2

## 2022-05-13 DIAGNOSIS — N92.6 IRREGULAR MENSES: Primary | ICD-10-CM

## 2022-05-13 PROCEDURE — 99213 OFFICE O/P EST LOW 20 MIN: CPT | Performed by: NURSE PRACTITIONER

## 2022-05-13 ASSESSMENT — ANXIETY QUESTIONNAIRES
7. FEELING AFRAID AS IF SOMETHING AWFUL MIGHT HAPPEN: SEVERAL DAYS
4. TROUBLE RELAXING: MORE THAN HALF THE DAYS
GAD7 TOTAL SCORE: 14
GAD7 TOTAL SCORE: 14
2. NOT BEING ABLE TO STOP OR CONTROL WORRYING: MORE THAN HALF THE DAYS
3. WORRYING TOO MUCH ABOUT DIFFERENT THINGS: MORE THAN HALF THE DAYS
GAD7 TOTAL SCORE: 14
6. BECOMING EASILY ANNOYED OR IRRITABLE: MORE THAN HALF THE DAYS
5. BEING SO RESTLESS THAT IT IS HARD TO SIT STILL: MORE THAN HALF THE DAYS
1. FEELING NERVOUS, ANXIOUS, OR ON EDGE: NEARLY EVERY DAY
8. IF YOU CHECKED OFF ANY PROBLEMS, HOW DIFFICULT HAVE THESE MADE IT FOR YOU TO DO YOUR WORK, TAKE CARE OF THINGS AT HOME, OR GET ALONG WITH OTHER PEOPLE?: VERY DIFFICULT
7. FEELING AFRAID AS IF SOMETHING AWFUL MIGHT HAPPEN: SEVERAL DAYS

## 2022-05-13 ASSESSMENT — PATIENT HEALTH QUESTIONNAIRE - PHQ9
SUM OF ALL RESPONSES TO PHQ QUESTIONS 1-9: 8
10. IF YOU CHECKED OFF ANY PROBLEMS, HOW DIFFICULT HAVE THESE PROBLEMS MADE IT FOR YOU TO DO YOUR WORK, TAKE CARE OF THINGS AT HOME, OR GET ALONG WITH OTHER PEOPLE: SOMEWHAT DIFFICULT
SUM OF ALL RESPONSES TO PHQ QUESTIONS 1-9: 8

## 2022-05-13 NOTE — PROGRESS NOTES
Assessment & Plan     Irregular menses  We will set her up for pelvic ultrasound and evaluation by GYN  She was asked to schedule with Dr. Harris in the next few weeks to have this done she would like to proceed with that      Antonella Waldrop NP  [facility]    Praful Yao is a 51 year old who presents for the following health issues: menstrual issues, irregular cycles, has kept track of dates  Patient has been keeping track of her menstrual periods because it been more irregular for the past year.  Sometimes she has 70 days between periods.  Most recently her last menstrual period started on April 21 and she just finished.  They are not necessarily heavy or crampy but the bleeding pattern is very irregular.  She has no hot flashes.  No family history of uterine or ovarian cancer.  Her mom had a partial hysterectomy for presumed bleeding.    Najma has had a colonoscopy and EGD as recently as December to explore  what she describes as low iron and it was normal with the exception of hemorrhoids.  Her Pap smear is up-to-date and they have always been normal  She is with the same partner and he has had a vasectomy  She is not on any type of hormonal contraception    Abnormal Bleeding Problem    History of Present Illness       Mental Health Follow-up:                    Today's PHQ-9         PHQ-9 Total Score: 8  PHQ-9 Q9 Thoughts of better off dead/self-harm past 2 weeks :   (P) Not at all    How difficult have these problems made it for you to do your work, take care of things at home, or get along with other people: Somewhat difficult    Today's SANTIAGO-7 Score: 14    Reason for visit:  Irregular heavy periods  Symptom onset:  More than a month  Symptoms include:  Longer, heavier periods; variable length of time between periods  Symptom intensity:  Severe  Symptom progression:  Worsening  Had these symptoms before:  Yes  Has tried/received treatment for these symptoms:  No  What makes  it worse:  N/A  What makes it better:  Shower, laying down or relaxing meditating, playing games    She eats 2-3 servings of fruits and vegetables daily.She consumes 2 sweetened beverage(s) daily.She exercises with enough effort to increase her heart rate 30 to 60 minutes per day.  She exercises with enough effort to increase her heart rate 4 days per week. She is missing 2 dose(s) of medications per week.  She is not taking prescribed medications regularly due to remembering to take.             Review of Systems   Genitourinary: Positive for menstrual problem.            Objective    /78 (BP Location: Right arm, Patient Position: Sitting)   Pulse 86   Temp 98  F (36.7  C)   Wt 83.2 kg (183 lb 6.4 oz)   SpO2 98%   BMI 28.72 kg/m    Body mass index is 28.72 kg/m .  Physical Exam   She is alert and oriented no acute distress skin is warm pink dry

## 2022-05-14 ASSESSMENT — ANXIETY QUESTIONNAIRES: GAD7 TOTAL SCORE: 14

## 2022-05-14 ASSESSMENT — PATIENT HEALTH QUESTIONNAIRE - PHQ9: SUM OF ALL RESPONSES TO PHQ QUESTIONS 1-9: 8

## 2022-05-25 ENCOUNTER — OFFICE VISIT (OUTPATIENT)
Dept: OBGYN | Facility: CLINIC | Age: 52
End: 2022-05-25

## 2022-05-25 VITALS — DIASTOLIC BLOOD PRESSURE: 68 MMHG | SYSTOLIC BLOOD PRESSURE: 104 MMHG | HEART RATE: 74 BPM | TEMPERATURE: 97.4 F

## 2022-05-25 DIAGNOSIS — N92.6 IRREGULAR MENSTRUAL CYCLE: Primary | ICD-10-CM

## 2022-05-25 PROCEDURE — 99203 OFFICE O/P NEW LOW 30 MIN: CPT | Mod: 25 | Performed by: OBSTETRICS & GYNECOLOGY

## 2022-05-25 RX ORDER — MEDROXYPROGESTERONE ACETATE 10 MG
TABLET ORAL
Qty: 30 TABLET | Refills: 3 | Status: SHIPPED | OUTPATIENT
Start: 2022-05-25 | End: 2023-05-10
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
## 2022-06-08 ENCOUNTER — TRANSFERRED RECORDS (OUTPATIENT)
Dept: HEALTH INFORMATION MANAGEMENT | Facility: CLINIC | Age: 52
End: 2022-06-08

## 2022-06-15 ENCOUNTER — OFFICE VISIT (OUTPATIENT)
Dept: OBGYN | Facility: CLINIC | Age: 52
End: 2022-06-15

## 2022-06-15 VITALS
OXYGEN SATURATION: 96 % | BODY MASS INDEX: 27.97 KG/M2 | SYSTOLIC BLOOD PRESSURE: 106 MMHG | HEIGHT: 67 IN | HEART RATE: 83 BPM | DIASTOLIC BLOOD PRESSURE: 70 MMHG | WEIGHT: 178.2 LBS | TEMPERATURE: 98.4 F

## 2022-06-15 DIAGNOSIS — N92.1 MENORRHAGIA WITH IRREGULAR CYCLE: Primary | ICD-10-CM

## 2022-06-15 PROCEDURE — 99212 OFFICE O/P EST SF 10 MIN: CPT | Performed by: OBSTETRICS & GYNECOLOGY

## 2022-06-15 NOTE — PROGRESS NOTES
"    Subjective   Najma is a 51 year old who presents for the following   Abnormal Bleeding Problem    Najma came in today to discuss the fact that she had much heavier bleeding when she completed Provera a few days ago..  She finally stopped bleeding a few days after the Provera after bleeding for about 47 days.  She has had some labs drawn in [place] which she brought in for my review.  Her FSH is low, progesterone is low with estrogen levels being normal.  She has several other labs drawn including normal tSH.  The labs were reviewed with the patient.  Hemoglobin is 11  Medication Followup of  Provera and discuss labs    Taking Medication as prescribed: yes    Side Effects:  None    Medication Helping Symptoms:  yes        Review of Systems   Genitourinary: Positive for menstrual problem.        Objective    /70 (BP Location: Right arm)   Pulse 83   Temp 98.4  F (36.9  C) (Tympanic)   Ht 1.702 m (5' 7\")   Wt 80.8 kg (178 lb 3.2 oz)   SpO2 96%   BMI 27.91 kg/m    Body mass index is 27.91 kg/m .    A- response to Provera was as anticipated.  Future cycles should not respond with this heavy menstrual flow since the endometrium will not be as thickened as it was because of the persistent anovulatory cycle.    Plan: The patient will take Provera 10 days each month and see how bleeding is controlled.  No other should treatment should be necessary at this time.Future options were discussed if provera is not effective    Oswaldo Harris MD    "

## 2022-06-24 ENCOUNTER — TELEPHONE (OUTPATIENT)
Dept: FAMILY MEDICINE | Facility: CLINIC | Age: 52
End: 2022-06-24

## 2022-06-24 ENCOUNTER — OFFICE VISIT (OUTPATIENT)
Dept: FAMILY MEDICINE | Facility: CLINIC | Age: 52
End: 2022-06-24

## 2022-06-24 VITALS
TEMPERATURE: 99 F | HEIGHT: 67 IN | BODY MASS INDEX: 27.31 KG/M2 | OXYGEN SATURATION: 99 % | DIASTOLIC BLOOD PRESSURE: 78 MMHG | HEART RATE: 88 BPM | RESPIRATION RATE: 20 BRPM | SYSTOLIC BLOOD PRESSURE: 116 MMHG | WEIGHT: 174 LBS

## 2022-06-24 DIAGNOSIS — F07.81 POST-TRAUMATIC BRAIN SYNDROME: ICD-10-CM

## 2022-06-24 DIAGNOSIS — F41.1 GAD (GENERALIZED ANXIETY DISORDER): ICD-10-CM

## 2022-06-24 DIAGNOSIS — F30.8 HYPOMANIA (H): ICD-10-CM

## 2022-06-24 DIAGNOSIS — F31.62 BIPOLAR DISORDER, CURRENT EPISODE MIXED, MODERATE (H): Primary | ICD-10-CM

## 2022-06-24 PROBLEM — F31.9 BIPOLAR DISORDER (H): Status: ACTIVE | Noted: 2022-06-24

## 2022-06-24 PROBLEM — G44.229 CHRONIC TENSION HEADACHE: Status: ACTIVE | Noted: 2022-06-24

## 2022-06-24 PROBLEM — G43.909 MIGRAINES: Status: ACTIVE | Noted: 2019-04-10

## 2022-06-24 PROBLEM — E66.9 OBESITY: Status: ACTIVE | Noted: 2022-06-24

## 2022-06-24 PROBLEM — H53.19 OSCILLOPSIA: Status: ACTIVE | Noted: 2022-06-24

## 2022-06-24 PROBLEM — D50.9 IRON DEFICIENCY ANEMIA: Status: ACTIVE | Noted: 2022-06-24

## 2022-06-24 PROCEDURE — 99214 OFFICE O/P EST MOD 30 MIN: CPT | Performed by: PHYSICIAN ASSISTANT

## 2022-06-24 RX ORDER — DEXTROAMPHETAMINE SACCHARATE, AMPHETAMINE ASPARTATE, DEXTROAMPHETAMINE SULFATE AND AMPHETAMINE SULFATE 2.5; 2.5; 2.5; 2.5 MG/1; MG/1; MG/1; MG/1
20 TABLET ORAL 2 TIMES DAILY
Qty: 60 TABLET | Refills: 0 | Status: SHIPPED | OUTPATIENT
Start: 2022-06-24 | End: 2022-06-24

## 2022-06-24 RX ORDER — DEXTROAMPHETAMINE SACCHARATE, AMPHETAMINE ASPARTATE, DEXTROAMPHETAMINE SULFATE AND AMPHETAMINE SULFATE 2.5; 2.5; 2.5; 2.5 MG/1; MG/1; MG/1; MG/1
20 TABLET ORAL 2 TIMES DAILY
Qty: 60 TABLET | Refills: 0 | Status: SHIPPED | OUTPATIENT
Start: 2022-07-20 | End: 2022-06-24

## 2022-06-24 RX ORDER — DEXTROAMPHETAMINE SACCHARATE, AMPHETAMINE ASPARTATE, DEXTROAMPHETAMINE SULFATE AND AMPHETAMINE SULFATE 2.5; 2.5; 2.5; 2.5 MG/1; MG/1; MG/1; MG/1
20 TABLET ORAL 2 TIMES DAILY
Qty: 60 TABLET | Refills: 0 | Status: SHIPPED | OUTPATIENT
Start: 2022-07-20 | End: 2022-08-05

## 2022-06-24 RX ORDER — DEXTROAMPHETAMINE SACCHARATE, AMPHETAMINE ASPARTATE, DEXTROAMPHETAMINE SULFATE AND AMPHETAMINE SULFATE 2.5; 2.5; 2.5; 2.5 MG/1; MG/1; MG/1; MG/1
20 TABLET ORAL 2 TIMES DAILY
Qty: 60 TABLET | Refills: 0 | Status: SHIPPED | OUTPATIENT
Start: 2022-06-24 | End: 2022-08-05

## 2022-06-24 RX ORDER — HYDROXYZINE HYDROCHLORIDE 25 MG/1
25 TABLET, FILM COATED ORAL 3 TIMES DAILY PRN
Qty: 30 TABLET | Refills: 1 | Status: SHIPPED | OUTPATIENT
Start: 2022-06-24 | End: 2023-01-09

## 2022-06-24 RX ORDER — HYDROXYZINE HYDROCHLORIDE 25 MG/1
25 TABLET, FILM COATED ORAL 3 TIMES DAILY PRN
Qty: 30 TABLET | Refills: 1 | Status: SHIPPED | OUTPATIENT
Start: 2022-06-24 | End: 2022-06-24

## 2022-06-24 ASSESSMENT — ANXIETY QUESTIONNAIRES
3. WORRYING TOO MUCH ABOUT DIFFERENT THINGS: NEARLY EVERY DAY
GAD7 TOTAL SCORE: 18
GAD7 TOTAL SCORE: 18
7. FEELING AFRAID AS IF SOMETHING AWFUL MIGHT HAPPEN: SEVERAL DAYS
2. NOT BEING ABLE TO STOP OR CONTROL WORRYING: NEARLY EVERY DAY
7. FEELING AFRAID AS IF SOMETHING AWFUL MIGHT HAPPEN: SEVERAL DAYS
6. BECOMING EASILY ANNOYED OR IRRITABLE: NEARLY EVERY DAY
8. IF YOU CHECKED OFF ANY PROBLEMS, HOW DIFFICULT HAVE THESE MADE IT FOR YOU TO DO YOUR WORK, TAKE CARE OF THINGS AT HOME, OR GET ALONG WITH OTHER PEOPLE?: VERY DIFFICULT
1. FEELING NERVOUS, ANXIOUS, OR ON EDGE: NEARLY EVERY DAY
5. BEING SO RESTLESS THAT IT IS HARD TO SIT STILL: MORE THAN HALF THE DAYS
4. TROUBLE RELAXING: NEARLY EVERY DAY
GAD7 TOTAL SCORE: 18

## 2022-06-24 ASSESSMENT — ENCOUNTER SYMPTOMS
NERVOUS/ANXIOUS: 1
AGITATION: 1
CONSTITUTIONAL NEGATIVE: 1
RESPIRATORY NEGATIVE: 1
CARDIOVASCULAR NEGATIVE: 1

## 2022-06-24 ASSESSMENT — PATIENT HEALTH QUESTIONNAIRE - PHQ9
SUM OF ALL RESPONSES TO PHQ QUESTIONS 1-9: 18
SUM OF ALL RESPONSES TO PHQ QUESTIONS 1-9: 18
10. IF YOU CHECKED OFF ANY PROBLEMS, HOW DIFFICULT HAVE THESE PROBLEMS MADE IT FOR YOU TO DO YOUR WORK, TAKE CARE OF THINGS AT HOME, OR GET ALONG WITH OTHER PEOPLE: VERY DIFFICULT
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
## 2022-06-24 NOTE — TELEPHONE ENCOUNTER
Reason for Call:  Other prescription    Detailed comments: [facility] does not have either medications that were Rx'd today, avail.  Pt would like Rx sent to [facility] in [place]    Phone Number Patient can be reached at: Home number on file [phone] (home)    Best Time: anytime    Can we leave a detailed message on this number? YES    Call taken on 6/24/2022 at 12:41 PM by Nathalie Castillo

## 2022-07-11 ENCOUNTER — OFFICE VISIT (OUTPATIENT)
Dept: FAMILY MEDICINE | Facility: CLINIC | Age: 52
End: 2022-07-11

## 2022-07-11 VITALS
HEIGHT: 67 IN | WEIGHT: 175 LBS | SYSTOLIC BLOOD PRESSURE: 128 MMHG | DIASTOLIC BLOOD PRESSURE: 84 MMHG | HEART RATE: 80 BPM | RESPIRATION RATE: 16 BRPM | BODY MASS INDEX: 27.47 KG/M2 | TEMPERATURE: 98.5 F

## 2022-07-11 DIAGNOSIS — J30.2 SEASONAL ALLERGIC RHINITIS, UNSPECIFIED TRIGGER: Primary | ICD-10-CM

## 2022-07-11 DIAGNOSIS — S46.812A TRAPEZIUS STRAIN, LEFT, INITIAL ENCOUNTER: ICD-10-CM

## 2022-07-11 PROCEDURE — 99213 OFFICE O/P EST LOW 20 MIN: CPT | Performed by: PHYSICIAN ASSISTANT

## 2022-07-11 RX ORDER — LORAZEPAM 0.5 MG/1
0.5 TABLET ORAL EVERY 6 HOURS PRN
COMMUNITY
End: 2023-03-07

## 2022-07-11 ASSESSMENT — ENCOUNTER SYMPTOMS
SORE THROAT: 0
NECK PAIN: 1
TROUBLE SWALLOWING: 0
NECK STIFFNESS: 1
GASTROINTESTINAL NEGATIVE: 1
CARDIOVASCULAR NEGATIVE: 1
CONSTITUTIONAL NEGATIVE: 1
RESPIRATORY NEGATIVE: 1

## 2022-07-11 NOTE — PROGRESS NOTES
1. Seasonal allergic rhinitis, unspecified trigger  Sent in Rx for claritin D  - loratadine-pseudoePHEDrine (CLARITIN-D 24-HOUR)  MG 24 hr tablet; Take 1 tablet by mouth daily  Dispense: 90 tablet; Refill: 3    2. Trapezius strain, left, initial encounter  Will use topical diclofenac prn to help with pain and swelling of neck  Continue to use gentle exercises  Continue with PT, perhaps ultrasound or iontophoresis  - diclofenac (VOLTAREN) 1 % topical gel; Apply 2 g topically 4 times daily as needed for moderate pain  Dispense: 50 g; Refill: 1      Praful Yao is a 51 year old accompanied by her self, presenting for the following health issues: Pt is c/o painful lump on left side of neck x 3 days  Mass      Mass  Associated symptoms include neck pain. Pertinent negatives include no sore throat.   History of Present Illness       Reason for visit:  Neck knot or bump    She eats 2-3 servings of fruits and vegetables daily.She consumes 1 sweetened beverage(s) daily.She exercises with enough effort to increase her heart rate 20 to 29 minutes per day.  She exercises with enough effort to increase her heart rate 5 days per week. She is missing 2 dose(s) of medications per week.  She is not taking prescribed medications regularly due to remembering to take.     She has been having PT (traction) for radicular sxs and neck pain  And after this therapy she developed a painful lump on left side of her neck  There are no spasms  Has continued to do gentle neck stretches  And has been icing the area        Review of Systems   Constitutional: Negative.    HENT: Negative for sore throat and trouble swallowing.    Respiratory: Negative.    Cardiovascular: Negative.    Gastrointestinal: Negative.    Musculoskeletal: Positive for neck pain and neck stiffness.            Objective    /84 (BP Location: Right arm, Patient Position: Sitting, Cuff Size: Adult Regular)   Pulse 80   Temp 98.5  F (36.9  C) (Tympanic)    "Resp 16   Ht 1.702 m (5' 7\")   Wt 79.4 kg (175 lb)   BMI 27.41 kg/m    Body mass index is 27.41 kg/m .  Physical Exam  Vitals reviewed.   Constitutional:       Appearance: Normal appearance.   HENT:      Right Ear: Tympanic membrane normal.      Left Ear: Tympanic membrane normal.   Neck:      Comments: Small mass and tenderness over left trapezius  Pain on left side of neck with lateral head bending to right  Cardiovascular:      Rate and Rhythm: Normal rate and regular rhythm.      Pulses: Normal pulses.      Heart sounds: Normal heart sounds.   Pulmonary:      Effort: Pulmonary effort is normal.      Breath sounds: Normal breath sounds.   Musculoskeletal:      Cervical back: Tenderness present.   Lymphadenopathy:      Cervical: No cervical adenopathy.   Neurological:      Mental Status: She is alert.                            .  ..  "

## 2022-07-25 ENCOUNTER — TRANSFERRED RECORDS (OUTPATIENT)
Dept: FAMILY MEDICINE | Facility: CLINIC | Age: 52
End: 2022-07-25

## 2022-08-05 ENCOUNTER — MYC MEDICAL ADVICE (OUTPATIENT)
Dept: FAMILY MEDICINE | Facility: CLINIC | Age: 52
End: 2022-08-05

## 2022-08-05 ENCOUNTER — OFFICE VISIT (OUTPATIENT)
Dept: FAMILY MEDICINE | Facility: CLINIC | Age: 52
End: 2022-08-05

## 2022-08-05 VITALS
WEIGHT: 173 LBS | SYSTOLIC BLOOD PRESSURE: 108 MMHG | DIASTOLIC BLOOD PRESSURE: 76 MMHG | OXYGEN SATURATION: 99 % | RESPIRATION RATE: 16 BRPM | HEART RATE: 83 BPM | BODY MASS INDEX: 27.1 KG/M2

## 2022-08-05 DIAGNOSIS — R79.89 ABNORMAL CORTISOL LEVEL: Primary | ICD-10-CM

## 2022-08-05 PROBLEM — S13.4XXA WHIPLASH: Status: ACTIVE | Noted: 2021-01-18

## 2022-08-05 PROBLEM — K64.9 HEMORRHOIDS: Status: ACTIVE | Noted: 2021-12-02

## 2022-08-05 PROBLEM — M79.18 CERVICAL MYOFASCIAL PAIN SYNDROME: Status: ACTIVE | Noted: 2022-08-05

## 2022-08-05 PROBLEM — T43.205A: Status: ACTIVE | Noted: 2019-04-10

## 2022-08-05 PROCEDURE — 99214 OFFICE O/P EST MOD 30 MIN: CPT | Performed by: FAMILY MEDICINE

## 2022-08-05 RX ORDER — DEXTROAMPHETAMINE SACCHARATE, AMPHETAMINE ASPARTATE, DEXTROAMPHETAMINE SULFATE AND AMPHETAMINE SULFATE 5; 5; 5; 5 MG/1; MG/1; MG/1; MG/1
20 TABLET ORAL 2 TIMES DAILY
COMMUNITY
Start: 2022-07-12 | End: 2023-01-09

## 2022-08-05 ASSESSMENT — PATIENT HEALTH QUESTIONNAIRE - PHQ9
SUM OF ALL RESPONSES TO PHQ QUESTIONS 1-9: 12
10. IF YOU CHECKED OFF ANY PROBLEMS, HOW DIFFICULT HAVE THESE PROBLEMS MADE IT FOR YOU TO DO YOUR WORK, TAKE CARE OF THINGS AT HOME, OR GET ALONG WITH OTHER PEOPLE: SOMEWHAT DIFFICULT
SUM OF ALL RESPONSES TO PHQ QUESTIONS 1-9: 12

## 2022-08-05 NOTE — PROGRESS NOTES
"  Assessment & Plan     Abnormal cortisol level  After review of salivary cortisol testing it showed no change throughout the day.  This suggests exogenous steroid consumption.  In reviewing the chart it shows she was on Provera which is most likely the cause of the flat cortisol reasoning.    Offered endocrinology referral but patient declined.      BMI:   Estimated body mass index is 27.1 kg/m  as calculated from the following:    Height as of 7/11/22: 1.702 m (5' 7\").    Weight as of this encounter: 78.5 kg (173 lb).       Depression Screening Follow Up    PHQ 8/5/2022   PHQ-9 Total Score 12   Q9: Thoughts of better off dead/self-harm past 2 weeks Not at all   Some encounter information is confidential and restricted. Go to Review Flowsheets activity to see all data.     Last PHQ-9 8/5/2022   1.  Little interest or pleasure in doing things 1   2.  Feeling down, depressed, or hopeless 1   3.  Trouble falling or staying asleep, or sleeping too much 2   4.  Feeling tired or having little energy 2   5.  Poor appetite or overeating 3   6.  Feeling bad about yourself 0   7.  Trouble concentrating 1   8.  Moving slowly or restless 2   Q9: Thoughts of better off dead/self-harm past 2 weeks 0   PHQ-9 Total Score 12   Some encounter information is confidential and restricted. Go to Review Flowsheets activity to see all data.       Follow Up Actions Taken  Patient counseled, no additional follow up at this time.  Depression Action Plan reviewed with patient.   Has appointment with psychiatry        Dick Burch MD  [facility]    Praful Yao is a 51 year old, presenting for the following health issues:  Results (Cortisol lab results.)      History of Present Illness       Reason for visit:  Follow up labs, lump on neck    She eats 2-3 servings of fruits and vegetables daily.She consumes 1 sweetened beverage(s) daily.She exercises with enough effort to increase her heart rate 20 " to 29 minutes per day.  She exercises with enough effort to increase her heart rate 4 days per week. She is missing 2 dose(s) of medications per week.  She is not taking prescribed medications regularly due to remembering to take.    Today's PHQ-9         PHQ-9 Total Score: 12    PHQ-9 Q9 Thoughts of better off dead/self-harm past 2 weeks :   Not at all    How difficult have these problems made it for you to do your work, take care of things at home, or get along with other people: Somewhat difficult             Review of Systems   Constitutional, HEENT, cardiovascular, pulmonary, gi and gu systems are negative, except as otherwise noted.      Objective    /76 (BP Location: Right arm, Patient Position: Sitting)   Pulse 83   Resp 16   Wt 78.5 kg (173 lb)   LMP 07/11/2022 (Approximate)   SpO2 99%   Breastfeeding No   BMI 27.10 kg/m    Body mass index is 27.1 kg/m .  Physical Exam   GENERAL: healthy, alert and no distress  NECK: no adenopathy, no asymmetry, masses, or scars and thyroid normal to palpation  RESP: lungs clear to auscultation - no rales, rhonchi or wheezes  CV: regular rate and rhythm, normal S1 S2, no S3 or S4, no murmur, click or rub, no peripheral edema and peripheral pulses strong  ABDOMEN: soft, nontender, no hepatosplenomegaly, no masses and bowel sounds normal  MS: no gross musculoskeletal defects noted, no edema    Reviewed external cortisol results.    30 minutes spent on the date of the encounter doing chart review, history and exam, documentation and further activities per the note                  .  ..

## 2022-08-17 ENCOUNTER — OFFICE VISIT (OUTPATIENT)
Dept: FAMILY MEDICINE | Facility: CLINIC | Age: 52
End: 2022-08-17
Payer: COMMERCIAL

## 2022-08-17 VITALS
SYSTOLIC BLOOD PRESSURE: 134 MMHG | HEART RATE: 89 BPM | TEMPERATURE: 98.3 F | OXYGEN SATURATION: 100 % | DIASTOLIC BLOOD PRESSURE: 80 MMHG | WEIGHT: 170.9 LBS | BODY MASS INDEX: 26.77 KG/M2

## 2022-08-17 DIAGNOSIS — M79.18 CERVICAL MYOFASCIAL PAIN SYNDROME: Primary | ICD-10-CM

## 2022-08-17 PROCEDURE — 99214 OFFICE O/P EST MOD 30 MIN: CPT | Performed by: INTERNAL MEDICINE
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
## 2022-08-31 ENCOUNTER — TRANSFERRED RECORDS (OUTPATIENT)
Dept: HEALTH INFORMATION MANAGEMENT | Facility: CLINIC | Age: 52
End: 2022-08-31

## 2022-09-13 RX ORDER — MUPIROCIN 20 MG/G
1 OINTMENT TOPICAL 2 TIMES DAILY PRN
COMMUNITY
Start: 2022-08-26 | End: 2022-11-10

## 2022-09-13 RX ORDER — TRIAMCINOLONE ACETONIDE 1 MG/G
1 CREAM TOPICAL 2 TIMES DAILY
COMMUNITY
Start: 2022-08-19 | End: 2022-09-14

## 2022-09-14 ENCOUNTER — OFFICE VISIT (OUTPATIENT)
Dept: FAMILY MEDICINE | Facility: CLINIC | Age: 52
End: 2022-09-14

## 2022-09-14 VITALS
DIASTOLIC BLOOD PRESSURE: 77 MMHG | TEMPERATURE: 98.6 F | HEART RATE: 80 BPM | SYSTOLIC BLOOD PRESSURE: 109 MMHG | WEIGHT: 163 LBS | BODY MASS INDEX: 25.53 KG/M2 | RESPIRATION RATE: 16 BRPM

## 2022-09-14 DIAGNOSIS — L08.9 LOCAL INFECTION OF SKIN AND SUBCUTANEOUS TISSUE: Primary | ICD-10-CM

## 2022-09-14 PROCEDURE — 99212 OFFICE O/P EST SF 10 MIN: CPT | Performed by: FAMILY MEDICINE

## 2022-09-14 RX ORDER — SULFAMETHOXAZOLE/TRIMETHOPRIM 800-160 MG
TABLET ORAL
COMMUNITY
Start: 2022-08-26 | End: 2022-09-14

## 2022-09-14 RX ORDER — PROPRANOLOL HYDROCHLORIDE 20 MG/1
20 TABLET ORAL 2 TIMES DAILY
COMMUNITY
Start: 2022-08-18 | End: 2023-01-09

## 2022-09-14 RX ORDER — MUPIROCIN 20 MG/G
OINTMENT TOPICAL
COMMUNITY
Start: 2022-08-26 | End: 2022-09-14

## 2022-09-22 ENCOUNTER — MYC MEDICAL ADVICE (OUTPATIENT)
Dept: FAMILY MEDICINE | Facility: CLINIC | Age: 52
End: 2022-09-22

## 2022-09-22 DIAGNOSIS — F07.81 POST-TRAUMATIC BRAIN SYNDROME: Primary | ICD-10-CM

## 2022-09-22 DIAGNOSIS — R47.1 DYSARTHRIA: ICD-10-CM

## 2022-09-22 NOTE — TELEPHONE ENCOUNTER
Please see and advise on patient MyChart message.    Requesting referral for speech.     Fredrick Riddle RN  [facility]

## 2022-09-24 ENCOUNTER — HEALTH MAINTENANCE LETTER (OUTPATIENT)
Age: 52
End: 2022-09-24

## 2022-10-06 ENCOUNTER — TRANSFERRED RECORDS (OUTPATIENT)
Dept: HEALTH INFORMATION MANAGEMENT | Facility: CLINIC | Age: 52
End: 2022-10-06

## 2022-11-01 ENCOUNTER — MYC MEDICAL ADVICE (OUTPATIENT)
Dept: FAMILY MEDICINE | Facility: CLINIC | Age: 52
End: 2022-11-01

## 2022-11-09 ASSESSMENT — ANXIETY QUESTIONNAIRES
GAD7 TOTAL SCORE: 19
IF YOU CHECKED OFF ANY PROBLEMS ON THIS QUESTIONNAIRE, HOW DIFFICULT HAVE THESE PROBLEMS MADE IT FOR YOU TO DO YOUR WORK, TAKE CARE OF THINGS AT HOME, OR GET ALONG WITH OTHER PEOPLE: VERY DIFFICULT
3. WORRYING TOO MUCH ABOUT DIFFERENT THINGS: NEARLY EVERY DAY
6. BECOMING EASILY ANNOYED OR IRRITABLE: NEARLY EVERY DAY
7. FEELING AFRAID AS IF SOMETHING AWFUL MIGHT HAPPEN: SEVERAL DAYS
1. FEELING NERVOUS, ANXIOUS, OR ON EDGE: NEARLY EVERY DAY
8. IF YOU CHECKED OFF ANY PROBLEMS, HOW DIFFICULT HAVE THESE MADE IT FOR YOU TO DO YOUR WORK, TAKE CARE OF THINGS AT HOME, OR GET ALONG WITH OTHER PEOPLE?: VERY DIFFICULT
4. TROUBLE RELAXING: NEARLY EVERY DAY
GAD7 TOTAL SCORE: 19
7. FEELING AFRAID AS IF SOMETHING AWFUL MIGHT HAPPEN: SEVERAL DAYS
GAD7 TOTAL SCORE: 19
5. BEING SO RESTLESS THAT IT IS HARD TO SIT STILL: NEARLY EVERY DAY
2. NOT BEING ABLE TO STOP OR CONTROL WORRYING: NEARLY EVERY DAY

## 2022-11-10 ENCOUNTER — OFFICE VISIT (OUTPATIENT)
Dept: FAMILY MEDICINE | Facility: CLINIC | Age: 52
End: 2022-11-10

## 2022-11-10 VITALS
HEIGHT: 67 IN | SYSTOLIC BLOOD PRESSURE: 148 MMHG | WEIGHT: 164.4 LBS | BODY MASS INDEX: 25.8 KG/M2 | HEART RATE: 77 BPM | OXYGEN SATURATION: 100 % | TEMPERATURE: 98.3 F | DIASTOLIC BLOOD PRESSURE: 89 MMHG

## 2022-11-10 DIAGNOSIS — F07.81 POST-TRAUMATIC BRAIN SYNDROME: ICD-10-CM

## 2022-11-10 DIAGNOSIS — F31.62 BIPOLAR DISORDER, CURRENT EPISODE MIXED, MODERATE (H): ICD-10-CM

## 2022-11-10 DIAGNOSIS — G44.229 CHRONIC TENSION-TYPE HEADACHE, NOT INTRACTABLE: Primary | ICD-10-CM

## 2022-11-10 DIAGNOSIS — F41.1 GAD (GENERALIZED ANXIETY DISORDER): ICD-10-CM

## 2022-11-10 DIAGNOSIS — M79.18 CERVICAL MYOFASCIAL PAIN SYNDROME: ICD-10-CM

## 2022-11-10 PROCEDURE — 99214 OFFICE O/P EST MOD 30 MIN: CPT | Performed by: FAMILY MEDICINE

## 2022-11-10 RX ORDER — LAMOTRIGINE 100 MG/1
200 TABLET ORAL DAILY
Qty: 180 TABLET | Refills: 1 | Status: SHIPPED | OUTPATIENT
Start: 2022-11-10 | End: 2023-04-04

## 2022-11-10 RX ORDER — QUETIAPINE FUMARATE 25 MG/1
25 TABLET, FILM COATED ORAL AT BEDTIME
Qty: 90 TABLET | Refills: 1 | Status: SHIPPED | OUTPATIENT
Start: 2022-11-10 | End: 2023-01-09

## 2022-11-10 ASSESSMENT — ANXIETY QUESTIONNAIRES: GAD7 TOTAL SCORE: 19

## 2022-11-10 NOTE — PROGRESS NOTES
"  Assessment & Plan     Chronic tension-type headache, not intractable  Progressing expected with slight worsening since stopping lamotrigine  Continue with current treatment    Post-traumatic brain syndrome  See above    Cervical myofascial pain syndrome  See above    SANTIAGO (generalized anxiety disorder)  Worsening off lamotrigine and quetiapine  Resume medications as below  - QUEtiapine (SEROQUEL) 25 MG tablet; Take 1 tablet (25 mg) by mouth At Bedtime  - lamoTRIgine (LAMICTAL) 100 MG tablet; Take 2 tablets (200 mg) by mouth daily    Bipolar disorder, current episode mixed, moderate (H)  Worsening since off medication  Referral to psychiatry  Continue medications at current doses  - [facility] Referral; Future  - QUEtiapine (SEROQUEL) 25 MG tablet; Take 1 tablet (25 mg) by mouth At Bedtime  - lamoTRIgine (LAMICTAL) 100 MG tablet; Take 2 tablets (200 mg) by mouth daily             BMI:   Estimated body mass index is 25.75 kg/m  as calculated from the following:    Height as of this encounter: 1.702 m (5' 7\").    Weight as of this encounter: 74.6 kg (164 lb 6.4 oz).           No follow-ups on file.    Irwin Eckert MD  [facility]    Praful Yao is a 52 year old accompanied by her self, presenting for the following health issues:  MH Follow Up (Anxiety follow up, discuss finding new mental health provider) and Headache (Also discuss headaches)      History of Present Illness       Mental Health Follow-up:  Patient presents to follow-up on Anxiety.    Patient's anxiety since last visit has been:  Worse  The patient is having other symptoms associated with anxiety.  Any significant life events: health concerns  Patient is feeling anxious or having panic attacks.  Patient has no concerns about alcohol or drug use.    Headaches:   Since the patient's last clinic visit, headaches are: worsened  The patient is getting headaches:  Headache, neck pain worse without " "lamotrigine  She is able to do normal daily activities when she has a migraine.  The patient is taking the following rescue/relief medications:  Ibuprofen (Advil, Motrin) and Tylenol   Patient states \"I get no relief\" from the rescue/relief medications.   The patient is taking the following medications to prevent migraines:  Other  In the past 4 weeks, the patient has gone to an Urgent Care or Emergency Room 0 times times due to headaches.    Reason for visit:  Renew medication for PCS and mental health    She eats 2-3 servings of fruits and vegetables daily.She consumes 2 sweetened beverage(s) daily.She exercises with enough effort to increase her heart rate 20 to 29 minutes per day.  She exercises with enough effort to increase her heart rate 4 days per week. She is missing 7 dose(s) of medications per week.  She is not taking prescribed medications regularly due to other.  Today's SANTIAGO-7 Score: 19     Patient has not been feeling well since stopping lamotrigine and quetiapine.  She needs to find a new psychiatrist.  She has been on the medication for some time.  Headache and myofascial pain are stable.          Review of Systems   Constitutional, HEENT, cardiovascular, pulmonary, gi and gu systems are negative, except as otherwise noted.      Objective    BP (!) 148/89 (BP Location: Right arm, Patient Position: Sitting, Cuff Size: Adult Regular)   Pulse 77   Temp 98.3  F (36.8  C) (Tympanic)   Ht 1.702 m (5' 7\")   Wt 74.6 kg (164 lb 6.4 oz)   SpO2 100%   BMI 25.75 kg/m    Body mass index is 25.75 kg/m .  Physical Exam   Alert, oriented, no acute distress  Neck supple  Lungs clear  Heart has regular rate and rhythm  Cooperative, slightly pressured speech, appropriate affect                    "

## 2022-11-17 ENCOUNTER — TRANSFERRED RECORDS (OUTPATIENT)
Dept: HEALTH INFORMATION MANAGEMENT | Facility: CLINIC | Age: 52
End: 2022-11-17

## 2022-12-15 ENCOUNTER — TRANSFERRED RECORDS (OUTPATIENT)
Dept: HEALTH INFORMATION MANAGEMENT | Facility: CLINIC | Age: 52
End: 2022-12-15

## 2022-12-26 ENCOUNTER — MYC MEDICAL ADVICE (OUTPATIENT)
Dept: FAMILY MEDICINE | Facility: CLINIC | Age: 52
End: 2022-12-26

## 2022-12-26 DIAGNOSIS — F41.1 GAD (GENERALIZED ANXIETY DISORDER): Primary | ICD-10-CM

## 2022-12-26 RX ORDER — CLONAZEPAM 1 MG/1
1 TABLET ORAL 2 TIMES DAILY
Qty: 180 TABLET | Refills: 0 | Status: SHIPPED | OUTPATIENT
Start: 2022-12-26 | End: 2023-03-22

## 2022-12-26 NOTE — TELEPHONE ENCOUNTER
Please see my chart message.    Routing refill request to provider for review/approval because:  Drug not on the [facility] refill protocol     Last Written Prescription Date: 9/10/2020  Last Fill Quantity: 180,  # refills: 1   Last office visit: 11/10/2022 with prescribing provider

## 2023-01-09 ENCOUNTER — OFFICE VISIT (OUTPATIENT)
Dept: FAMILY MEDICINE | Facility: CLINIC | Age: 53
End: 2023-01-09

## 2023-01-09 VITALS
WEIGHT: 185.1 LBS | HEIGHT: 67 IN | RESPIRATION RATE: 20 BRPM | HEART RATE: 68 BPM | TEMPERATURE: 98.5 F | DIASTOLIC BLOOD PRESSURE: 84 MMHG | OXYGEN SATURATION: 100 % | SYSTOLIC BLOOD PRESSURE: 129 MMHG | BODY MASS INDEX: 29.05 KG/M2

## 2023-01-09 DIAGNOSIS — M79.18 CERVICAL MYOFASCIAL PAIN SYNDROME: Primary | ICD-10-CM

## 2023-01-09 DIAGNOSIS — F41.1 GAD (GENERALIZED ANXIETY DISORDER): ICD-10-CM

## 2023-01-09 DIAGNOSIS — F31.62 BIPOLAR DISORDER, CURRENT EPISODE MIXED, MODERATE (H): ICD-10-CM

## 2023-01-09 PROBLEM — R06.81 GERD WITH APNEA: Status: ACTIVE | Noted: 2020-01-31

## 2023-01-09 PROBLEM — G47.10 HYPERSOMNOLENCE: Status: ACTIVE | Noted: 2019-03-12

## 2023-01-09 PROBLEM — K21.9 GERD WITH APNEA: Status: ACTIVE | Noted: 2020-01-31

## 2023-01-09 PROCEDURE — 99213 OFFICE O/P EST LOW 20 MIN: CPT | Performed by: FAMILY MEDICINE

## 2023-01-09 RX ORDER — QUETIAPINE FUMARATE 50 MG/1
50 TABLET, FILM COATED ORAL AT BEDTIME
Qty: 30 TABLET | Refills: 5 | Status: SHIPPED | OUTPATIENT
Start: 2023-01-09 | End: 2023-07-31

## 2023-01-09 NOTE — PROGRESS NOTES
"  Assessment & Plan     Cervical myofascial pain syndrome  Continue follow-up with spine surgery.  The area on the left neck is a resolving lymph node.  There does not appear to be any concerning cervical nodes.    SANTIAGO (generalized anxiety disorder)  Controlled, continue with current medications  - QUEtiapine (SEROQUEL) 50 MG tablet; Take 1 tablet (50 mg) by mouth At Bedtime    Bipolar disorder, current episode mixed, moderate (H)  Stable, continue current medications  - QUEtiapine (SEROQUEL) 50 MG tablet; Take 1 tablet (50 mg) by mouth At Bedtime             BMI:   Estimated body mass index is 28.99 kg/m  as calculated from the following:    Height as of this encounter: 1.702 m (5' 7\").    Weight as of this encounter: 84 kg (185 lb 1.6 oz).           No follow-ups on file.    Irwin Eckert MD  [facility]    Praful Yao is a 52 year old accompanied by her self, presenting for the following health issues:  Follow Up (Discuss MRI results from 10/10/2022, referral for CT.  Also needs rx refill sent in for Seroquel 50mg daily--was changed from 25 to 50mg)      History of Present Illness       Reason for visit:  CT scan recommended in 10/2022 MRI report; update meds    She eats 2-3 servings of fruits and vegetables daily.She consumes 2 sweetened beverage(s) daily.She exercises with enough effort to increase her heart rate 20 to 29 minutes per day.  She exercises with enough effort to increase her heart rate 4 days per week. She is missing 2 dose(s) of medications per week.  She is not taking prescribed medications regularly due to remembering to take.       Patient is here for follow-up on her cervical myofascial pain as well as her bipolar disorder.  Quetiapine has been working well.  Bipolar disorder is stable  She feels more clearheaded.      Review of Systems         Objective    /84 (BP Location: Right arm, Patient Position: Sitting, Cuff Size: Adult Large)   Pulse 68 " "  Temp 98.5  F (36.9  C) (Tympanic)   Resp 20   Ht 1.702 m (5' 7\")   Wt 84 kg (185 lb 1.6 oz)   SpO2 100%   BMI 28.99 kg/m    Body mass index is 28.99 kg/m .  Physical Exam   GENERAL: healthy, alert and no distress  NECK: no adenopathy, no asymmetry, masses, or scars and thyroid normal to palpation  RESP: lungs clear to auscultation - no rales, rhonchi or wheezes  CV: regular rate and rhythm, normal S1 S2, no S3 or S4, no murmur, click or rub, no peripheral edema and peripheral pulses strong  MS: no gross musculoskeletal defects noted, no edema                    "

## 2023-01-29 ENCOUNTER — HEALTH MAINTENANCE LETTER (OUTPATIENT)
Age: 53
End: 2023-01-29

## 2023-02-04 ENCOUNTER — HOSPITAL ENCOUNTER (EMERGENCY)
Facility: CLINIC | Age: 53
Discharge: HOME | End: 2023-02-04
Attending: STUDENT IN AN ORGANIZED HEALTH CARE EDUCATION/TRAINING PROGRAM | Admitting: STUDENT IN AN ORGANIZED HEALTH CARE EDUCATION/TRAINING PROGRAM

## 2023-02-04 ENCOUNTER — APPOINTMENT (OUTPATIENT)
Dept: MRI IMAGING | Facility: CLINIC | Age: 53
End: 2023-02-04
Attending: STUDENT IN AN ORGANIZED HEALTH CARE EDUCATION/TRAINING PROGRAM

## 2023-02-04 VITALS
OXYGEN SATURATION: 94 % | BODY MASS INDEX: 29.03 KG/M2 | TEMPERATURE: 98.2 F | WEIGHT: 185 LBS | HEART RATE: 72 BPM | SYSTOLIC BLOOD PRESSURE: 127 MMHG | DIASTOLIC BLOOD PRESSURE: 67 MMHG | HEIGHT: 67 IN | RESPIRATION RATE: 29 BRPM

## 2023-02-04 DIAGNOSIS — R29.90 STROKE-LIKE SYMPTOM: ICD-10-CM

## 2023-02-04 DIAGNOSIS — R20.0 LEFT SIDED NUMBNESS: Primary | ICD-10-CM

## 2023-02-04 LAB
ANION GAP SERPL CALCULATED.3IONS-SCNC: 9 MMOL/L (ref 5–18)
APTT PPP: 27 SECONDS (ref 22–38)
BASOPHILS # BLD AUTO: 0 10E3/UL (ref 0–0.2)
BASOPHILS NFR BLD AUTO: 1 %
BUN SERPL-MCNC: 11 MG/DL (ref 8–22)
CALCIUM SERPL-MCNC: 9.5 MG/DL (ref 8.5–10.5)
CHLORIDE BLD-SCNC: 107 MMOL/L (ref 98–107)
CO2 SERPL-SCNC: 23 MMOL/L (ref 22–31)
CREAT SERPL-MCNC: 0.83 MG/DL (ref 0.6–1.1)
EOSINOPHIL # BLD AUTO: 0 10E3/UL (ref 0–0.7)
EOSINOPHIL NFR BLD AUTO: 0 %
ERYTHROCYTE [DISTWIDTH] IN BLOOD BY AUTOMATED COUNT: 11.9 % (ref 10–15)
GFR SERPL CREATININE-BSD FRML MDRD: 85 ML/MIN/1.73M2
GLUCOSE BLD-MCNC: 100 MG/DL (ref 70–125)
GLUCOSE BLDC GLUCOMTR-MCNC: 108 MG/DL (ref 70–99)
HCT VFR BLD AUTO: 43.1 % (ref 35–47)
HGB BLD-MCNC: 14.6 G/DL (ref 11.7–15.7)
IMM GRANULOCYTES # BLD: 0 10E3/UL
IMM GRANULOCYTES NFR BLD: 0 %
INR PPP: 0.96 (ref 0.85–1.15)
LYMPHOCYTES # BLD AUTO: 1.7 10E3/UL (ref 0.8–5.3)
LYMPHOCYTES NFR BLD AUTO: 35 %
MCH RBC QN AUTO: 30.6 PG (ref 26.5–33)
MCHC RBC AUTO-ENTMCNC: 33.9 G/DL (ref 31.5–36.5)
MCV RBC AUTO: 90 FL (ref 78–100)
MONOCYTES # BLD AUTO: 0.4 10E3/UL (ref 0–1.3)
MONOCYTES NFR BLD AUTO: 9 %
NEUTROPHILS # BLD AUTO: 2.7 10E3/UL (ref 1.6–8.3)
NEUTROPHILS NFR BLD AUTO: 55 %
NRBC # BLD AUTO: 0 10E3/UL
NRBC BLD AUTO-RTO: 0 /100
PLATELET # BLD AUTO: 234 10E3/UL (ref 150–450)
POTASSIUM BLD-SCNC: 3.7 MMOL/L (ref 3.5–5)
RBC # BLD AUTO: 4.77 10E6/UL (ref 3.8–5.2)
SODIUM SERPL-SCNC: 139 MMOL/L (ref 136–145)
TROPONIN I SERPL-MCNC: <0.01 NG/ML (ref 0–0.29)
WBC # BLD AUTO: 4.9 10E3/UL (ref 4–11)

## 2023-02-04 PROCEDURE — 99285 EMERGENCY DEPT VISIT HI MDM: CPT | Mod: 25

## 2023-02-04 PROCEDURE — 70551 MRI BRAIN STEM W/O DYE: CPT

## 2023-02-04 PROCEDURE — 99207 PR NO CHARGE LOS: CPT | Performed by: PHYSICIAN ASSISTANT

## 2023-02-04 PROCEDURE — 80048 BASIC METABOLIC PNL TOTAL CA: CPT | Performed by: EMERGENCY MEDICINE

## 2023-02-04 PROCEDURE — 85730 THROMBOPLASTIN TIME PARTIAL: CPT | Performed by: EMERGENCY MEDICINE

## 2023-02-04 PROCEDURE — 36415 COLL VENOUS BLD VENIPUNCTURE: CPT | Performed by: EMERGENCY MEDICINE

## 2023-02-04 PROCEDURE — 82962 GLUCOSE BLOOD TEST: CPT

## 2023-02-04 PROCEDURE — 84484 ASSAY OF TROPONIN QUANT: CPT | Performed by: EMERGENCY MEDICINE

## 2023-02-04 PROCEDURE — 93005 ELECTROCARDIOGRAM TRACING: CPT | Performed by: EMERGENCY MEDICINE

## 2023-02-04 PROCEDURE — 85610 PROTHROMBIN TIME: CPT | Performed by: EMERGENCY MEDICINE

## 2023-02-04 PROCEDURE — 85025 COMPLETE CBC W/AUTO DIFF WBC: CPT | Performed by: EMERGENCY MEDICINE

## 2023-02-04 RX ORDER — IOPAMIDOL 755 MG/ML
75 INJECTION, SOLUTION INTRAVASCULAR ONCE
Status: DISCONTINUED | OUTPATIENT
Start: 2023-02-04 | End: 2023-02-04 | Stop reason: HOSPADM

## 2023-02-04 ASSESSMENT — ACTIVITIES OF DAILY LIVING (ADL)
ADLS_ACUITY_SCORE: 33
ADLS_ACUITY_SCORE: 35

## 2023-02-04 NOTE — CONSULTS
"  [facility]    Stroke Telephone Note    I was called by Sudhakar Medel on 02/04/23 regarding patient Najma Carvalho. The patient is a 52 year old female who presents to the ED today with intermittent left tongue and face and arm and leg numbness which has been present for 2 years but got worse last night at 6:30 pm. No focal weakness on exam  BP in /83    Stroke Code Data (for stroke code without tele)  Stroke code activated 02/04/23   1040   Stroke provider first response  02/04/23   1040            Last known normal          Unknown   Time of discovery   (or onset of symptoms) 02/03/23   1830   Head CT read by Stroke Neuro Dr/Provider  (n/a)       Was stroke code de-escalated? Yes 02/04/23 1054          Imaging Findings   None yet    Intravenous Thrombolysis  Not given due to:   - unclear or unfavorable risk-benefit profile for extended window thrombolysis beyond the conventional 4.5 hour time window    Endovascular Treatment  no suspected LVO based on history    Impression  Numbness unclear if due to stroke, TIA or peripheral process    Recommendations   - Stroke code deescalated due to not meeting tier 2 criteria. Recommend brain MRI with and without contrast    Discussed with my attending Dr. Zuleta    My recommendations are based on the information provided over the phone by Najma Carvalho's in-person providers. They are not intended to replace the clinical judgment of her in-person providers. I was not requested to personally see or examine the patient at this time.    Nessa Jimenez PA-C  Vascular Neurology    To page me or covering stroke neurology team member, click here: [facility]  Choose \"On Call\" tab at top, then select \"NEUROLOGY/ALL SITES\" from middle drop-down box, press Enter, then look for \"stroke\" or \"telestroke\" for your site.        "
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
## 2023-02-04 NOTE — ED NOTES
[facility]  Expected date:   Expected time:   Means of arrival: Ambulance  Comments:
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
## 2023-02-04 NOTE — ED NOTES
Pt reports numbness to left arm, left and tongue and states it is still same.  Awaiting mri results

## 2023-02-06 LAB
ATRIAL RATE - MUSE: 63 BPM
DIASTOLIC BLOOD PRESSURE - MUSE: NORMAL MMHG
INTERPRETATION ECG - MUSE: NORMAL
P AXIS - MUSE: 64 DEGREES
PR INTERVAL - MUSE: 140 MS
QRS DURATION - MUSE: 86 MS
QT - MUSE: 404 MS
QTC - MUSE: 413 MS
R AXIS - MUSE: 80 DEGREES
SYSTOLIC BLOOD PRESSURE - MUSE: NORMAL MMHG
T AXIS - MUSE: 55 DEGREES
VENTRICULAR RATE- MUSE: 63 BPM

## 2023-02-16 ENCOUNTER — TRANSFERRED RECORDS (OUTPATIENT)
Dept: HEALTH INFORMATION MANAGEMENT | Facility: CLINIC | Age: 53
End: 2023-02-16

## 2023-02-20 ENCOUNTER — NURSE TRIAGE (OUTPATIENT)
Dept: NURSING | Facility: CLINIC | Age: 53
End: 2023-02-20
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
## 2023-03-07 ENCOUNTER — OFFICE VISIT (OUTPATIENT)
Dept: FAMILY MEDICINE | Facility: CLINIC | Age: 53
End: 2023-03-07
Payer: COMMERCIAL

## 2023-03-07 VITALS
RESPIRATION RATE: 19 BRPM | DIASTOLIC BLOOD PRESSURE: 81 MMHG | OXYGEN SATURATION: 99 % | HEART RATE: 83 BPM | BODY MASS INDEX: 28.25 KG/M2 | WEIGHT: 180 LBS | SYSTOLIC BLOOD PRESSURE: 125 MMHG | HEIGHT: 67 IN | TEMPERATURE: 97.9 F

## 2023-03-07 DIAGNOSIS — M79.18 CERVICAL MYOFASCIAL PAIN SYNDROME: ICD-10-CM

## 2023-03-07 DIAGNOSIS — F07.81 POST-TRAUMATIC BRAIN SYNDROME: Primary | ICD-10-CM

## 2023-03-07 PROCEDURE — 99215 OFFICE O/P EST HI 40 MIN: CPT | Performed by: FAMILY MEDICINE
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
## 2023-03-14 ENCOUNTER — VIRTUAL VISIT (OUTPATIENT)
Dept: PHARMACY | Facility: CLINIC | Age: 53
End: 2023-03-14
Attending: FAMILY MEDICINE

## 2023-03-14 DIAGNOSIS — F31.62 BIPOLAR DISORDER, CURRENT EPISODE MIXED, MODERATE (H): ICD-10-CM

## 2023-03-14 DIAGNOSIS — E55.9 VITAMIN D DEFICIENCY: ICD-10-CM

## 2023-03-14 DIAGNOSIS — N92.1 MENORRHAGIA WITH IRREGULAR CYCLE: ICD-10-CM

## 2023-03-14 DIAGNOSIS — G44.229 CHRONIC TENSION-TYPE HEADACHE, NOT INTRACTABLE: ICD-10-CM

## 2023-03-14 DIAGNOSIS — G43.909 MIGRAINE WITHOUT STATUS MIGRAINOSUS, NOT INTRACTABLE, UNSPECIFIED MIGRAINE TYPE: ICD-10-CM

## 2023-03-14 DIAGNOSIS — F41.1 GAD (GENERALIZED ANXIETY DISORDER): Primary | ICD-10-CM

## 2023-03-14 DIAGNOSIS — M54.2 NECK PAIN: ICD-10-CM

## 2023-03-14 DIAGNOSIS — J30.2 SEASONAL ALLERGIC RHINITIS, UNSPECIFIED TRIGGER: ICD-10-CM

## 2023-03-14 DIAGNOSIS — M79.18 CERVICAL MYOFASCIAL PAIN SYNDROME: ICD-10-CM

## 2023-03-14 DIAGNOSIS — G47.10 HYPERSOMNOLENCE: ICD-10-CM

## 2023-03-14 DIAGNOSIS — Z78.9 TAKES DIETARY SUPPLEMENTS: ICD-10-CM

## 2023-03-14 DIAGNOSIS — F07.81 POST-TRAUMATIC BRAIN SYNDROME: ICD-10-CM

## 2023-03-14 DIAGNOSIS — F30.8 HYPOMANIA (H): ICD-10-CM

## 2023-03-14 DIAGNOSIS — D50.9 IRON DEFICIENCY ANEMIA, UNSPECIFIED IRON DEFICIENCY ANEMIA TYPE: ICD-10-CM

## 2023-03-14 PROCEDURE — 99605 MTMS BY PHARM NP 15 MIN: CPT | Mod: VID | Performed by: PHARMACIST

## 2023-03-14 PROCEDURE — 99607 MTMS BY PHARM ADDL 15 MIN: CPT | Mod: VID | Performed by: PHARMACIST

## 2023-03-14 RX ORDER — IBUPROFEN 200 MG
200 TABLET ORAL EVERY 4 HOURS PRN
COMMUNITY

## 2023-03-14 NOTE — Clinical Note
Miladys Gomez and JUVENTINO Briones -  I saw Najma for an initial MTM visit and provided a number of medication recommendations to her to optimize her medication regimen.   Specifically for her mental health medications, we discussed the goal to better manage anxiety and bipolar without scheduled clonazepam (been taking 1 mg twice daily scheduled since 2019).  JUVENTINO Briones - she will be establishing care with you in a few weeks and I'm hoping for your input on psych meds. I can help with any dose adjustments/tapers, etc. As necessary.   Please see the attached note for more details. Thanks! KB
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
## 2023-03-15 ENCOUNTER — LAB (OUTPATIENT)
Dept: LAB | Facility: CLINIC | Age: 53
End: 2023-03-15

## 2023-03-15 DIAGNOSIS — D50.9 IRON DEFICIENCY ANEMIA, UNSPECIFIED IRON DEFICIENCY ANEMIA TYPE: ICD-10-CM

## 2023-03-15 DIAGNOSIS — E55.9 VITAMIN D DEFICIENCY: ICD-10-CM

## 2023-03-15 DIAGNOSIS — M79.18 CERVICAL MYOFASCIAL PAIN SYNDROME: ICD-10-CM

## 2023-03-15 DIAGNOSIS — G47.10 HYPERSOMNOLENCE: ICD-10-CM

## 2023-03-15 LAB
DEPRECATED CALCIDIOL+CALCIFEROL SERPL-MC: 33 UG/L (ref 20–75)
FERRITIN SERPL-MCNC: 36 NG/ML (ref 11–328)
IRON BINDING CAPACITY (ROCHE): 303 UG/DL (ref 240–430)
IRON SATN MFR SERPL: 23 % (ref 15–46)
IRON SERPL-MCNC: 69 UG/DL (ref 37–145)
TRANSFERRIN SERPL-MCNC: 277 MG/DL (ref 200–360)

## 2023-03-15 PROCEDURE — 83550 IRON BINDING TEST: CPT

## 2023-03-15 PROCEDURE — 83540 ASSAY OF IRON: CPT

## 2023-03-15 PROCEDURE — 82306 VITAMIN D 25 HYDROXY: CPT

## 2023-03-15 PROCEDURE — 36415 COLL VENOUS BLD VENIPUNCTURE: CPT

## 2023-03-15 PROCEDURE — 82728 ASSAY OF FERRITIN: CPT

## 2023-03-15 PROCEDURE — 84466 ASSAY OF TRANSFERRIN: CPT

## 2023-03-20 ENCOUNTER — TRANSFERRED RECORDS (OUTPATIENT)
Dept: HEALTH INFORMATION MANAGEMENT | Facility: CLINIC | Age: 53
End: 2023-03-20

## 2023-03-21 NOTE — PATIENT INSTRUCTIONS
"Recommendations from today's MTM visit:                                                    MTM (medication therapy management) is a service provided by a clinical pharmacist designed to help you get the most of out of your medicines.      1. For allergies: I recommend stopping or at least minimizing the pseudoephedrine.    --for runny nose, sneezing, itchy eyes, etc:  I recommend using over-the-counter loratadine without the pseudoephedrine. This is sold as generic loratadine or brand claritin 10 mg once daily.  If this is not effective, you can use ipratropium nasal spray. This is not a steroid. Let us know if you would like a prescription.    --if you have symptoms of congestion, we can consider retrying a different nasal steroid (other than flonase since flonase gave your thrush), or  immediate release pseudoephedrine (30 mg tablets) only as needed.    2. Recommend not using the tizanidine when you need to drive due to the fatigue it can cause.    3. Be sure to always take ibuprofen with food.    4. Keep your appointment with the psych provider for early April.    As we discussed today, I recommend using an alternate anxiety medication beside clonazepam long term. Please discuss this with the [facility] provider so you can review alternate medication therapy options together.    5. Labs: vitamin D level, iron panel + ferritin. Make a lab only appointment.    Follow-up: via [employer] after lab results.   MTM appointment as needed after psych provider appointment for clonazepam tape or other med changes.    It was great speaking with you today.  I value your experience and would be very thankful for your time in providing feedback in our clinic survey. In the next few days, you may receive an email or text message from [employer] with a link to a survey related to your  clinical pharmacist.\"     To schedule another MTM appointment, please call the clinic directly or you may call the MTM scheduling line at [phone] or " toll-free at [phone].     My Clinical Pharmacist's contact information:                                                      Please feel free to contact me with any questions or concerns you have.      Екатерина Oliver, PharmD  Medication Therapy Management (MTM) Pharmacist

## 2023-03-22 ENCOUNTER — MYC REFILL (OUTPATIENT)
Dept: FAMILY MEDICINE | Facility: CLINIC | Age: 53
End: 2023-03-22

## 2023-03-22 DIAGNOSIS — F41.1 GAD (GENERALIZED ANXIETY DISORDER): ICD-10-CM

## 2023-03-23 RX ORDER — CLONAZEPAM 1 MG/1
1 TABLET ORAL 2 TIMES DAILY
Qty: 60 TABLET | Refills: 0 | Status: SHIPPED | OUTPATIENT
Start: 2023-03-23 | End: 2023-04-03

## 2023-03-23 NOTE — TELEPHONE ENCOUNTER
Patient comment: Will be out of this prescription before Apr 3rd appointment with psychiatry    Last office visit: 3/7/2023       Future Appointments 3/23/2023 - 9/19/2023      Date Visit Type Length Department Provider     4/3/2023  9:15 AM ADULT PSYCHIATRY NEW 60 min [facility] PSYCHIATRY Jennie Briones NP              6/12/2023  8:45 AM NEW CONCUSSION 45 min [facility] Sudhakar Kern,     Location Instructions:     [address] [place] [address]                   Requested Prescriptions   Pending Prescriptions Disp Refills     clonazePAM (KLONOPIN) 1 MG tablet 180 tablet 0     Sig: Take 1 tablet (1 mg) by mouth 2 times daily       There is no refill protocol information for this order

## 2023-03-28 ENCOUNTER — MYC MEDICAL ADVICE (OUTPATIENT)
Dept: FAMILY MEDICINE | Facility: CLINIC | Age: 53
End: 2023-03-28

## 2023-03-28 DIAGNOSIS — F41.1 GAD (GENERALIZED ANXIETY DISORDER): ICD-10-CM

## 2023-03-28 NOTE — TELEPHONE ENCOUNTER
RX for clonazepam sent to [facility] 3/23/23  [facility]      Future Appointments 3/28/2023 - 9/24/2023      Date Visit Type Length Department Provider     4/3/2023  9:15 AM ADULT PSYCHIATRY NEW 60 min [facility] PSYCHIATRY Jennie Briones NP              6/12/2023  8:45 AM NEW CONCUSSION 45 min [facility] Sudhakar Kern, DO    Location Instructions:     [address] [place] [address]

## 2023-04-03 ENCOUNTER — VIRTUAL VISIT (OUTPATIENT)
Dept: PSYCHIATRY | Facility: CLINIC | Age: 53
End: 2023-04-03
Attending: FAMILY MEDICINE

## 2023-04-03 DIAGNOSIS — Z53.9 DIAGNOSIS NOT YET DEFINED: Primary | ICD-10-CM

## 2023-04-03 RX ORDER — CLONAZEPAM 1 MG/1
1 TABLET ORAL 2 TIMES DAILY
Qty: 60 TABLET | Refills: 0 | Status: SHIPPED | OUTPATIENT
Start: 2023-04-03 | End: 2023-04-04

## 2023-04-03 ASSESSMENT — ANXIETY QUESTIONNAIRES
1. FEELING NERVOUS, ANXIOUS, OR ON EDGE: NEARLY EVERY DAY
6. BECOMING EASILY ANNOYED OR IRRITABLE: NEARLY EVERY DAY
5. BEING SO RESTLESS THAT IT IS HARD TO SIT STILL: MORE THAN HALF THE DAYS
IF YOU CHECKED OFF ANY PROBLEMS ON THIS QUESTIONNAIRE, HOW DIFFICULT HAVE THESE PROBLEMS MADE IT FOR YOU TO DO YOUR WORK, TAKE CARE OF THINGS AT HOME, OR GET ALONG WITH OTHER PEOPLE: SOMEWHAT DIFFICULT
3. WORRYING TOO MUCH ABOUT DIFFERENT THINGS: MORE THAN HALF THE DAYS
GAD7 TOTAL SCORE: 16
GAD7 TOTAL SCORE: 16
7. FEELING AFRAID AS IF SOMETHING AWFUL MIGHT HAPPEN: SEVERAL DAYS
2. NOT BEING ABLE TO STOP OR CONTROL WORRYING: MORE THAN HALF THE DAYS
7. FEELING AFRAID AS IF SOMETHING AWFUL MIGHT HAPPEN: SEVERAL DAYS
8. IF YOU CHECKED OFF ANY PROBLEMS, HOW DIFFICULT HAVE THESE MADE IT FOR YOU TO DO YOUR WORK, TAKE CARE OF THINGS AT HOME, OR GET ALONG WITH OTHER PEOPLE?: SOMEWHAT DIFFICULT
GAD7 TOTAL SCORE: 16
4. TROUBLE RELAXING: NEARLY EVERY DAY

## 2023-04-03 ASSESSMENT — PATIENT HEALTH QUESTIONNAIRE - PHQ9
10. IF YOU CHECKED OFF ANY PROBLEMS, HOW DIFFICULT HAVE THESE PROBLEMS MADE IT FOR YOU TO DO YOUR WORK, TAKE CARE OF THINGS AT HOME, OR GET ALONG WITH OTHER PEOPLE: SOMEWHAT DIFFICULT
SUM OF ALL RESPONSES TO PHQ QUESTIONS 1-9: 16
SUM OF ALL RESPONSES TO PHQ QUESTIONS 1-9: 16

## 2023-04-03 NOTE — PROGRESS NOTES
This visit was canceled today as the patient was connecting in [place].  She is rescheduled to meet with me tomorrow in [place].

## 2023-04-04 ENCOUNTER — VIRTUAL VISIT (OUTPATIENT)
Dept: PSYCHIATRY | Facility: CLINIC | Age: 53
End: 2023-04-04
Payer: COMMERCIAL

## 2023-04-04 DIAGNOSIS — F31.62 BIPOLAR DISORDER, CURRENT EPISODE MIXED, MODERATE (H): Primary | ICD-10-CM

## 2023-04-04 DIAGNOSIS — F41.1 GAD (GENERALIZED ANXIETY DISORDER): ICD-10-CM

## 2023-04-04 PROCEDURE — 99204 OFFICE O/P NEW MOD 45 MIN: CPT | Mod: VID | Performed by: NURSE PRACTITIONER

## 2023-04-04 RX ORDER — LAMOTRIGINE 100 MG/1
200 TABLET ORAL DAILY
Qty: 180 TABLET | Refills: 1 | Status: SHIPPED | OUTPATIENT
Start: 2023-04-04 | End: 2023-05-10

## 2023-04-04 RX ORDER — CLONAZEPAM 1 MG/1
1 TABLET ORAL 2 TIMES DAILY
Qty: 60 TABLET | Refills: 0 | Status: SHIPPED | OUTPATIENT
Start: 2023-05-01 | End: 2023-05-19

## 2023-04-04 RX ORDER — BUSPIRONE HYDROCHLORIDE 5 MG/1
5 TABLET ORAL 2 TIMES DAILY
Qty: 60 TABLET | Refills: 3 | Status: SHIPPED | OUTPATIENT
Start: 2023-04-04 | End: 2023-05-19 | Stop reason: DRUGHIGH
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
## 2023-04-04 NOTE — NURSING NOTE
Is the patient currently in the state of [place]? YES Patient in [place], [place]    Visit mode:VIDEO    If the visit is dropped, the patient can be reconnected by: VIDEO VISIT: Text to cell phone:   Telephone Information:   Mobile [phone]       Will anyone else be joining the visit? No  (If patient encounters technical issues they should call [phone])    How would you like to obtain your AVS? MyChart    Are changes needed to the allergy or medication list? NO    Rooming Documentation: Care team has reviewed attendance agreement with patient. Patient advised that two failed appointments within 6 months may lead to termination of current episode of care.      Reason for visit:  Treatment    TAMMI Grande

## 2023-04-05 ENCOUNTER — OFFICE VISIT (OUTPATIENT)
Dept: PHYSICAL MEDICINE AND REHAB | Facility: CLINIC | Age: 53
End: 2023-04-05

## 2023-04-05 VITALS — SYSTOLIC BLOOD PRESSURE: 129 MMHG | HEART RATE: 85 BPM | DIASTOLIC BLOOD PRESSURE: 89 MMHG

## 2023-04-05 DIAGNOSIS — M79.18 CERVICAL MYOFASCIAL PAIN SYNDROME: ICD-10-CM

## 2023-04-05 DIAGNOSIS — F07.81 POST-TRAUMATIC BRAIN SYNDROME: ICD-10-CM

## 2023-04-05 DIAGNOSIS — G24.3 LATEROCOLLIS: ICD-10-CM

## 2023-04-05 DIAGNOSIS — M43.6 RIGHT TORTICOLLIS: Primary | ICD-10-CM

## 2023-04-05 PROCEDURE — 99205 OFFICE O/P NEW HI 60 MIN: CPT | Performed by: PHYSICAL MEDICINE & REHABILITATION
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
## 2023-04-06 ENCOUNTER — TELEPHONE (OUTPATIENT)
Dept: PHYSICAL MEDICINE AND REHAB | Facility: CLINIC | Age: 53
End: 2023-04-06

## 2023-04-06 NOTE — TELEPHONE ENCOUNTER
Per Dr. Matthews, it is medically urgent for Najma Carvalho to receive Botox (Botulinum Toxin Type A) in 200 units every 3 months for their Right torticollis.

## 2023-04-07 ENCOUNTER — TELEPHONE (OUTPATIENT)
Dept: PHYSICAL MEDICINE AND REHAB | Facility: CLINIC | Age: 53
End: 2023-04-07

## 2023-04-07 NOTE — TELEPHONE ENCOUNTER
[facility] Call Center    Phone Message    May a detailed message be left on voicemail: yes     Reason for Call: Other: Patient returned call requesting the total cost. Please call patient to discuss, if patient is not available she is requesting a [employer] message with the cost information.    Action Taken: Message routed to:  Other: PM&R    Travel Screening: Not Applicable

## 2023-04-07 NOTE — TELEPHONE ENCOUNTER
ATC called and left message with patient.  Called to let patient know an estimate and waiver was created for her botox treatment.  I left a detailed message stating that per the prior authorization department she can create a payment plan with the billing office and that she will not be expected to pay up front.     No costs were relayed over VM. Requested the patient call back the clinic if she is interested in the estimated cost of the procedure.    Lincoln Acosta, SIERRA ATC on 4/7/2023 at 1:15 PM

## 2023-04-10 NOTE — TELEPHONE ENCOUNTER
Returned patient's call to discuss cost of botox treatment.  ATC did not reach patient so left a message.  Per pt request ATC will send [employer] message with cost details.     SIERRA Ortiz ATC on 4/10/2023 at 10:48 AM

## 2023-04-13 NOTE — PATIENT INSTRUCTIONS
1.  Buspirone 5 mg 2 times daily  2.  Clonazepam 1 mg twice daily-fill May 1  3.  Lamotrigine 200 mg daily  4.  Quetiapine 50 mg at bedtime  5.  Talk therapy, when able to, for learning skills to manage life stressors      Continue all other medical directions per primary care provider.   Continue all other medications as reviewed per electronic medical record today.   Safety plan reviewed. To the Emergency Department as needed or call after hours crisis line at [phone] or [phone]. [place] Crisis Text Line: Text [place] to 530340  or  Suicide LifeLine Chat: suicideSchoolnet.org/chat/  To schedule individual or family therapy, call [facility] at [phone].   Schedule an appointment with me in 6 weeks or sooner as needed.  Call [facility] at [phone] to schedule.  Follow up with primary care provider as planned or for acute medical concerns.  Call the psychiatric nurse line with medication questions or concerns at [phone].  [employer]ems-ID may be used to communicate with your provider, but this is not intended to be used for emergencies.    Crisis Resources:    National Suicide Prevention Lifeline: 925.345.6704 (TTY: 666.885.9117). Call anytime for help.  (www.suicidepreventionlifeline.org)  National [employer] on Mental Illness (www.feliciano.org): 144.788.7841 or 895-553-2829.   Mental Health Association (www.mentalhealth.org): 825.631.1229 or 540-582-7245.  [place] Crisis Text Line: Text [place] to 296752  Suicide LifeLine Chat: suicideSchoolnet.org/chat      Patient Education   The [facility]  What to Expect  Here's what to expect in the [facility] Program.   About the program  You'll be meeting with a psychiatric doctor to check your mental health. A psychiatric doctor helps you deal with troubling thoughts and feelings by giving you medicine. They'll make sure you know the plan for your care. You may see them for a long time. When you're  "feeling better, they may refer you back to seeing your family doctor.   If you have any questions, we'll be glad to talk to you.  About visits  Be open  At your visits, please talk openly about your problems. It may feel hard, but it's the best way for us to help you.  Cancelling visits  If you can't come to your visit, please call us right away at [phone]. If you don't cancel at least 24 hours (1 full day) before your visit, that's \"late cancellation.\"  Not showing up for your visits  Being very late is the same as not showing up. You'll be a \"no show\" if:  You're more than 15 minutes late for a 30-minute (half hour) visit.  You're more than 30 minutes late for a 60-minute (full hour) visit.  If you cancel late or don't show up 2 times within 6 months, we may end your care.  Getting help between visits  If you need help between visits, you can call us Monday to Friday from 8 a.m. to 4:30 p.m. at [phone].  Emergency care  Call 911 or go to the nearest emergency department if your life or someone else's life is in danger.  Call 318 anytime to reach the national Suicide and Crisis hotline.  Medicine refills  To refill your medicine, call your pharmacy. You can also call [facility]'s [facility] at [phone], Monday to Friday, 8 a.m. to 4:30 p.m. It can take 1 to 3 business days to get a refill.   Forms, letters, and tests  You may have papers to fill out, like FMLA, short-term disability, and workability. We can help you with these forms at your visits, but you must have an appointment. You may need more than 1 visit for this, to be in an intensive therapy program, or both.  Before we can give you medicine for ADHD, we may refer you to get tested for it or confirm it another way.  We may not be able to give you an emotional support animal letter.  We don't do mental health checks ordered by the court.   We don't do mental health testing, but we can refer you to get tested.   Thank you " for choosing us for your care.  For informational purposes only. Not to replace the advice of your health care provider. Copyright   2022 [facility]. All rights reserved. the grafter [id number] - 12/22.

## 2023-04-19 ENCOUNTER — OFFICE VISIT (OUTPATIENT)
Dept: PHYSICAL MEDICINE AND REHAB | Facility: CLINIC | Age: 53
End: 2023-04-19

## 2023-04-19 DIAGNOSIS — G24.3 CERVICAL DYSTONIA: Primary | ICD-10-CM

## 2023-04-19 PROCEDURE — 64616 CHEMODENERV MUSC NECK DYSTON: CPT | Mod: 50 | Performed by: PHYSICAL MEDICINE & REHABILITATION

## 2023-04-19 PROCEDURE — 95874 GUIDE NERV DESTR NEEDLE EMG: CPT | Performed by: PHYSICAL MEDICINE & REHABILITATION

## 2023-04-19 NOTE — LETTER
2023         RE: Najma Carvalho  [address] [place] [phone]        Dear Colleague,    Thank you for referring your patient, Najma Carvalho, to the [facility]. Please see a copy of my visit note below.    .  [facility]   PM&R clinic note        Interval history:     Najma Carvalho presents to clinic today for follow up regarding her cervical dystonia.  She has neck pain and her clinical presentation is in keeping with right torticollis and bilateral laterocollis   Was last seen in clinic  and BOTOX is considered to manage her dystonia.    Patient reports no new medical issues/ concerns since her last visit. No change in her health status.    Social history is unchanged,           Medications:  Current Outpatient Medications   Medication Sig Dispense Refill     busPIRone (BUSPAR) 5 MG tablet Take 1 tablet (5 mg) by mouth 2 times daily 60 tablet 3     Cholecalciferol (VITAMIN D3 PO) Take 4,000 Units by mouth daily       [START ON 2023] clonazePAM (KLONOPIN) 1 MG tablet Take 1 tablet (1 mg) by mouth 2 times daily Fill May 1 60 tablet 0     ibuprofen (ADVIL/MOTRIN) 200 MG tablet Take 200 mg by mouth every 4 hours as needed for pain       IRON-VITAMIN C PO Iron 65 mg elemental - vitamin 125 m tab a few days per wekk       lamoTRIgine (LAMICTAL) 100 MG tablet Take 2 tablets (200 mg) by mouth daily 180 tablet 1     loratadine-pseudoePHEDrine (CLARITIN-D 24-HOUR)  MG 24 hr tablet Take 1 tablet by mouth daily 90 tablet 3     Magnesium Oxide 250 MG TABS Take 1 tablet by mouth daily       medroxyPROGESTERone (PROVERA) 10 MG tablet Sig: one tab every day for 10 days each month 30 tablet 3     QUEtiapine (SEROQUEL) 50 MG tablet Take 1 tablet (50 mg) by mouth At Bedtime 30 tablet 5     tiZANidine (ZANAFLEX) 4 MG tablet Take 0.5-1 tablets (2-4 mg) by mouth 3 times daily as needed for muscle spasms (90) 90 tablet 1               Physical Exam:   There were no vitals taken for this visit.  Gen: NAD, pleasant and cooperative     Neuro/MSK:   No change in her examination compared to the previous exam.  Normal completer screening neurological exam.    Neck exam showed chin rotated to the right side with lateral deviation to the right. SCM tightness at the proximal insertion was noted.  Normal active ROM in neck directions: the following: flex, ext, sideways and rotation with limited rotation to the right side    Labs/Imaging:  Lab Results   Component Value Date    WBC 4.9 02/04/2023    HGB 14.6 02/04/2023    HCT 43.1 02/04/2023    MCV 90 02/04/2023     02/04/2023     Lab Results   Component Value Date     02/04/2023    POTASSIUM 3.7 02/04/2023    CHLORIDE 107 02/04/2023    CO2 23 02/04/2023     02/04/2023     Lab Results   Component Value Date    GFRESTIMATED 85 02/04/2023     Lab Results   Component Value Date    AST 17 05/07/2019    ALT 15 05/07/2019    ALKPHOS 81 05/07/2019    BILITOTAL 0.4 05/07/2019     Lab Results   Component Value Date    INR 0.96 02/04/2023     Lab Results   Component Value Date    BUN 11 02/04/2023    CR 0.83 02/04/2023              Assessment/Plan     .(G24.3) Cervical dystonia  (primary encounter diagnosis)        1. Work-up: none at this time    2. Therapy/equipment/braces:  Referral to PT was initiated to optimize cervical dystonia recovery.    3. Medications: none at this time    4. Interventions: BOTOX injections to manage cervical dystonia (see procedure notes)    5. Follow up: 6 weeks      Kimmy Matthews MD  Physical Medicine & Rehabilitation       60 minutes spent on the date of the encounter doing chart review, history and exam, documentation and further activities as noted above            Again, thank you for allowing me to participate in the care of your patient.        Sincerely,        Kimmy Matthews MD

## 2023-04-19 NOTE — PROCEDURES
?PROCEDURE: Cervical Dystonia management with chemodenervation?        Dr. Matthews    DIAGNOSIS: Right torticollis and bilateral laterocollis     Procedure note A written consent was obtained from the patient explaining the risks and benefits of the procedure. After reconstituting 1:1 using 1 cc of preservative free Normal Saline with 200 Units vial and using EMG guidance I injected the following:    Right Sternocleidomastoid 30 units in 2 sites  Right Levator Scapula 10 units in one site  Right Trapeizus 40 units in two sites      Left Sternocleidomastoid 10 units in one   Left Levator Scapula 10 units in one site  Left Trapeizus 40 units in two sites    Units discarded 60 units    Lot # ([id number])   Exp date (09/2025)     The patient tolerated the procedure well.  Post procedure care plan was explained to the patient

## 2023-04-19 NOTE — PROGRESS NOTES
.  [facility]   PM&R clinic note        Interval history:     Najma Carvalho presents to clinic today for follow up regarding her cervical dystonia.  She has neck pain and her clinical presentation is in keeping with right torticollis and bilateral laterocollis   Was last seen in clinic  and BOTOX is considered to manage her dystonia.    Patient reports no new medical issues/ concerns since her last visit. No change in her health status.    Social history is unchanged,           Medications:  Current Outpatient Medications   Medication Sig Dispense Refill     busPIRone (BUSPAR) 5 MG tablet Take 1 tablet (5 mg) by mouth 2 times daily 60 tablet 3     Cholecalciferol (VITAMIN D3 PO) Take 4,000 Units by mouth daily       [START ON 2023] clonazePAM (KLONOPIN) 1 MG tablet Take 1 tablet (1 mg) by mouth 2 times daily Fill May 1 60 tablet 0     ibuprofen (ADVIL/MOTRIN) 200 MG tablet Take 200 mg by mouth every 4 hours as needed for pain       IRON-VITAMIN C PO Iron 65 mg elemental - vitamin 125 m tab a few days per wekk       lamoTRIgine (LAMICTAL) 100 MG tablet Take 2 tablets (200 mg) by mouth daily 180 tablet 1     loratadine-pseudoePHEDrine (CLARITIN-D 24-HOUR)  MG 24 hr tablet Take 1 tablet by mouth daily 90 tablet 3     Magnesium Oxide 250 MG TABS Take 1 tablet by mouth daily       medroxyPROGESTERone (PROVERA) 10 MG tablet Sig: one tab every day for 10 days each month 30 tablet 3     QUEtiapine (SEROQUEL) 50 MG tablet Take 1 tablet (50 mg) by mouth At Bedtime 30 tablet 5     tiZANidine (ZANAFLEX) 4 MG tablet Take 0.5-1 tablets (2-4 mg) by mouth 3 times daily as needed for muscle spasms (90) 90 tablet 1              Physical Exam:   There were no vitals taken for this visit.  Gen: NAD, pleasant and cooperative     Neuro/MSK:   No change in her examination compared to the previous exam.  Normal completer screening neurological exam.    Neck exam showed chin rotated  to the right side with lateral deviation to the right. SCM tightness at the proximal insertion was noted.  Normal active ROM in neck directions: the following: flex, ext, sideways and rotation with limited rotation to the right side    Labs/Imaging:  Lab Results   Component Value Date    WBC 4.9 02/04/2023    HGB 14.6 02/04/2023    HCT 43.1 02/04/2023    MCV 90 02/04/2023     02/04/2023     Lab Results   Component Value Date     02/04/2023    POTASSIUM 3.7 02/04/2023    CHLORIDE 107 02/04/2023    CO2 23 02/04/2023     02/04/2023     Lab Results   Component Value Date    GFRESTIMATED 85 02/04/2023     Lab Results   Component Value Date    AST 17 05/07/2019    ALT 15 05/07/2019    ALKPHOS 81 05/07/2019    BILITOTAL 0.4 05/07/2019     Lab Results   Component Value Date    INR 0.96 02/04/2023     Lab Results   Component Value Date    BUN 11 02/04/2023    CR 0.83 02/04/2023              Assessment/Plan     .(G24.3) Cervical dystonia  (primary encounter diagnosis)        1. Work-up: none at this time    2. Therapy/equipment/braces:  Referral to PT was initiated to optimize cervical dystonia recovery.    3. Medications: none at this time    4. Interventions: BOTOX injections to manage cervical dystonia (see procedure notes)    5. Follow up: 6 weeks      Kimmy Matthews MD  Physical Medicine & Rehabilitation       60 minutes spent on the date of the encounter doing chart review, history and exam, documentation and further activities as noted above

## 2023-04-21 ENCOUNTER — HOSPITAL ENCOUNTER (OUTPATIENT)
Dept: PHYSICAL THERAPY | Facility: REHABILITATION | Age: 53
Discharge: HOME | End: 2023-04-21
Attending: PHYSICAL MEDICINE & REHABILITATION

## 2023-04-21 DIAGNOSIS — G24.3 CERVICAL DYSTONIA: ICD-10-CM

## 2023-04-21 PROCEDURE — 97110 THERAPEUTIC EXERCISES: CPT | Mod: GP | Performed by: PHYSICAL THERAPIST

## 2023-04-21 PROCEDURE — 97140 MANUAL THERAPY 1/> REGIONS: CPT | Mod: GP | Performed by: PHYSICAL THERAPIST

## 2023-04-21 PROCEDURE — 97162 PT EVAL MOD COMPLEX 30 MIN: CPT | Mod: GP | Performed by: PHYSICAL THERAPIST
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
## 2023-04-27 ENCOUNTER — HOSPITAL ENCOUNTER (OUTPATIENT)
Dept: PHYSICAL THERAPY | Facility: REHABILITATION | Age: 53
Discharge: HOME | End: 2023-04-27
Attending: PHYSICAL MEDICINE & REHABILITATION

## 2023-04-27 DIAGNOSIS — G24.3 CERVICAL DYSTONIA: Primary | ICD-10-CM

## 2023-04-27 PROCEDURE — 97110 THERAPEUTIC EXERCISES: CPT | Mod: GP

## 2023-04-27 PROCEDURE — 97140 MANUAL THERAPY 1/> REGIONS: CPT | Mod: GP

## 2023-04-27 NOTE — PROGRESS NOTES
"Subjective: Reports similar symptoms as last visit, has been doing HEP and likes cervical extension exercise however does not feel that UT or LS exercises have been helping. Shares that she feels things have losened up some in area from botox- L side feels looser, but is having some R side pain in cervical region.      Assessment: Najma presents to therapy for second visit with c/o two year history of cervical spine pain and stiffness. She has had two different rounds of therapy which have helped, but the pain is still there. She already has a robust strengthening program at home with the previous therapy sessions that helps manage the pain, but there hasn't been further change. She continues to have increased tightness and pain in her cervical paraspinals, UT, SCM, and LS. Pt states that she is open to performing dry needling to assist with symptoms in future sessions. Najma will benefit from PT to promote increased postural strength, decreased pain, and increased cervical ROM.     Therapeutic Exercise     Date 4/27/23 4/21/23   Exercise      UT stretch  X45\" B   LS stretch  X45\" B   Self-NAG  x10    Pec stretch in sidelying and doorway Reports no change, has done in past w/o change      Quadruped chin tuck with rotation Unable to trial due to previous burning pain down R arm      SNAG Reports no change in symptoms                                                      Madhuri Salomon, PT, DPT      "

## 2023-05-02 ENCOUNTER — MYC REFILL (OUTPATIENT)
Dept: PSYCHIATRY | Facility: CLINIC | Age: 53
End: 2023-05-02

## 2023-05-02 DIAGNOSIS — F41.1 GAD (GENERALIZED ANXIETY DISORDER): ICD-10-CM

## 2023-05-03 RX ORDER — CLONAZEPAM 1 MG/1
1 TABLET ORAL 2 TIMES DAILY
Qty: 60 TABLET | Refills: 0 | OUTPATIENT
Start: 2023-05-03

## 2023-05-03 NOTE — TELEPHONE ENCOUNTER
"Received an electronic refill request for clonazePAM (KLONOPIN) 1 MG tablets. Last script sent:    clonazePAM (KLONOPIN) 1 MG tablet 60 tablet 0 5/1/2023  No   Sig - Route: Take 1 tablet (1 mg) by mouth 2 times daily Fill May 1 - Oral     RN called pharmacy and they do have this refill still available and will prepare for the patient.     RN denied refill, \"Should already have refills on file\".    Cherry Boland RN on 5/3/2023 at 2:40 PM      "

## 2023-05-05 ENCOUNTER — HOSPITAL ENCOUNTER (OUTPATIENT)
Dept: PHYSICAL THERAPY | Facility: REHABILITATION | Age: 53
Discharge: HOME | End: 2023-05-05
Attending: PHYSICAL MEDICINE & REHABILITATION

## 2023-05-05 DIAGNOSIS — G24.3 CERVICAL DYSTONIA: Primary | ICD-10-CM

## 2023-05-05 PROCEDURE — 97110 THERAPEUTIC EXERCISES: CPT | Mod: GP | Performed by: PHYSICAL THERAPIST

## 2023-05-05 PROCEDURE — 20561 NDL INSJ W/O NJX 3+ MUSC: CPT | Performed by: PHYSICAL THERAPIST

## 2023-05-05 NOTE — PROGRESS NOTES
"Subjective: The knot in her neck has moved again and she can feel it a little more with left side bending. She had more pain on yesterday and her exercises helped to warm her neck up to stretch it out. She feels like the bump on her neck is decreasing in size.      Assessment: Najma responded well to the dry needling today, noting reproduction of her symptoms during the actual needling and then she had less palpable tenderness and fewer taut bands afterwards. She felt further relief with the stretches performed, especially the SCM stretch. Najma will continue to benefit from PT to promote further decrease in her muscle tension and decrease her pain.     Therapeutic Exercise     Date 5/5/23 4/27/23 4/21/23   Exercise       UT stretch x1' B  X45\" B   LS stretch x1' B  X45\" B   Self-NAG   x10    Pec stretch in sidelying and doorway  Reports no change, has done in past w/o change      Quadruped chin tuck with rotation  Unable to trial due to previous burning pain down R arm      SNAG  Reports no change in symptoms      SCM stretch x1' B                                                    Haroldo Karimi, PT, DPT, CMTPT/DN       "

## 2023-05-10 ENCOUNTER — OFFICE VISIT (OUTPATIENT)
Dept: FAMILY MEDICINE | Facility: CLINIC | Age: 53
End: 2023-05-10

## 2023-05-10 VITALS
HEIGHT: 68 IN | TEMPERATURE: 98 F | DIASTOLIC BLOOD PRESSURE: 78 MMHG | SYSTOLIC BLOOD PRESSURE: 104 MMHG | RESPIRATION RATE: 16 BRPM | OXYGEN SATURATION: 98 % | HEART RATE: 80 BPM | WEIGHT: 191.7 LBS | BODY MASS INDEX: 29.05 KG/M2

## 2023-05-10 DIAGNOSIS — Z13.220 LIPID SCREENING: ICD-10-CM

## 2023-05-10 DIAGNOSIS — F41.1 GAD (GENERALIZED ANXIETY DISORDER): ICD-10-CM

## 2023-05-10 DIAGNOSIS — F31.62 BIPOLAR DISORDER, CURRENT EPISODE MIXED, MODERATE (H): ICD-10-CM

## 2023-05-10 DIAGNOSIS — N92.6 IRREGULAR MENSTRUAL CYCLE: ICD-10-CM

## 2023-05-10 DIAGNOSIS — Z00.00 ROUTINE GENERAL MEDICAL EXAMINATION AT A HEALTH CARE FACILITY: Primary | ICD-10-CM

## 2023-05-10 DIAGNOSIS — Z12.31 VISIT FOR SCREENING MAMMOGRAM: ICD-10-CM

## 2023-05-10 DIAGNOSIS — R42 DIZZINESS: ICD-10-CM

## 2023-05-10 DIAGNOSIS — Z13.1 SCREENING FOR DIABETES MELLITUS: ICD-10-CM

## 2023-05-10 LAB
CHOLEST SERPL-MCNC: 201 MG/DL
HBA1C MFR BLD: 5 % (ref 0–5.6)
HDLC SERPL-MCNC: 69 MG/DL
LDLC SERPL CALC-MCNC: 109 MG/DL
NONHDLC SERPL-MCNC: 132 MG/DL
TRIGL SERPL-MCNC: 115 MG/DL

## 2023-05-10 PROCEDURE — 80061 LIPID PANEL: CPT | Performed by: FAMILY MEDICINE

## 2023-05-10 PROCEDURE — 99396 PREV VISIT EST AGE 40-64: CPT | Performed by: FAMILY MEDICINE

## 2023-05-10 PROCEDURE — 36415 COLL VENOUS BLD VENIPUNCTURE: CPT | Performed by: FAMILY MEDICINE

## 2023-05-10 PROCEDURE — 83036 HEMOGLOBIN GLYCOSYLATED A1C: CPT | Performed by: FAMILY MEDICINE

## 2023-05-10 PROCEDURE — 99214 OFFICE O/P EST MOD 30 MIN: CPT | Mod: 25 | Performed by: FAMILY MEDICINE

## 2023-05-10 RX ORDER — MEDROXYPROGESTERONE ACETATE 10 MG
TABLET ORAL
Qty: 30 TABLET | Refills: 3 | Status: SHIPPED | OUTPATIENT
Start: 2023-05-10 | End: 2024-01-15

## 2023-05-10 RX ORDER — LAMOTRIGINE 200 MG/1
TABLET ORAL
COMMUNITY
Start: 2023-04-08 | End: 2023-07-31

## 2023-05-10 ASSESSMENT — ENCOUNTER SYMPTOMS
COUGH: 0
ABDOMINAL PAIN: 0
DIZZINESS: 1
DIARRHEA: 0
SORE THROAT: 0
PALPITATIONS: 0
CHILLS: 0
FEVER: 0
HEMATOCHEZIA: 0
SHORTNESS OF BREATH: 0
JOINT SWELLING: 0
HEADACHES: 1
NERVOUS/ANXIOUS: 1
DYSURIA: 0
PARESTHESIAS: 0
MYALGIAS: 1
HEARTBURN: 0
WEAKNESS: 0
EYE PAIN: 0
ARTHRALGIAS: 1
FREQUENCY: 0
HEMATURIA: 0
BREAST MASS: 0
NAUSEA: 0
CONSTIPATION: 0
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
## 2023-05-10 NOTE — PATIENT INSTRUCTIONS
Ask Dr. Matthews about the hand numbness and if you need an EMG.        Preventive Health Recommendations  Female Ages 50 - 64    Yearly exam: See your health care provider every year in order to  Review health changes.   Discuss preventive care.    Review your medicines if your doctor has prescribed any.    Get a Pap test every three years (unless you have an abnormal result and your provider advises testing more often).  If you get Pap tests with HPV test, you only need to test every 5 years, unless you have an abnormal result.   You do not need a Pap test if your uterus was removed (hysterectomy) and you have not had cancer.  You should be tested each year for STDs (sexually transmitted diseases) if you're at risk.   Have a mammogram every 1 to 2 years.  Have a colonoscopy at age 50, or have a yearly FIT test (stool test). These exams screen for colon cancer.    Have a cholesterol test every 5 years, or more often if advised.  Have a diabetes test (fasting glucose) every three years. If you are at risk for diabetes, you should have this test more often.   If you are at risk for osteoporosis (brittle bone disease), think about having a bone density scan (DEXA).    Shots: Get a flu shot each year. Get a tetanus shot every 10 years.    Nutrition:   Eat at least 5 servings of fruits and vegetables each day.  Eat whole-grain bread, whole-wheat pasta and brown rice instead of white grains and rice.  Get adequate Calcium and Vitamin D.     Lifestyle  Exercise at least 150 minutes a week (30 minutes a day, 5 days a week). This will help you control your weight and prevent disease.  Limit alcohol to one drink per day.  No smoking.   Wear sunscreen to prevent skin cancer.   See your dentist every six months for an exam and cleaning.  See your eye doctor every 1 to 2 years.

## 2023-05-12 ENCOUNTER — HOSPITAL ENCOUNTER (OUTPATIENT)
Dept: PHYSICAL THERAPY | Facility: REHABILITATION | Age: 53
Discharge: HOME | End: 2023-05-12

## 2023-05-12 DIAGNOSIS — G24.3 CERVICAL DYSTONIA: Primary | ICD-10-CM

## 2023-05-12 PROCEDURE — 97110 THERAPEUTIC EXERCISES: CPT | Mod: GP | Performed by: PHYSICAL THERAPIST

## 2023-05-12 PROCEDURE — 20560 NDL INSJ W/O NJX 1 OR 2 MUSC: CPT | Performed by: PHYSICAL THERAPIST
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
## 2023-05-19 ENCOUNTER — VIRTUAL VISIT (OUTPATIENT)
Dept: PSYCHIATRY | Facility: CLINIC | Age: 53
End: 2023-05-19

## 2023-05-19 DIAGNOSIS — F41.1 GAD (GENERALIZED ANXIETY DISORDER): ICD-10-CM

## 2023-05-19 DIAGNOSIS — F31.62 BIPOLAR DISORDER, CURRENT EPISODE MIXED, MODERATE (H): Primary | ICD-10-CM

## 2023-05-19 PROCEDURE — 99214 OFFICE O/P EST MOD 30 MIN: CPT | Mod: 95 | Performed by: NURSE PRACTITIONER

## 2023-05-19 RX ORDER — BUSPIRONE HYDROCHLORIDE 10 MG/1
10 TABLET ORAL 2 TIMES DAILY
Qty: 60 TABLET | Refills: 3 | Status: SHIPPED | OUTPATIENT
Start: 2023-05-19 | End: 2023-07-31

## 2023-05-19 RX ORDER — CLONAZEPAM 1 MG/1
1 TABLET ORAL 2 TIMES DAILY
Qty: 60 TABLET | Refills: 0 | Status: SHIPPED | OUTPATIENT
Start: 2023-05-29 | End: 2023-06-28
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
## 2023-05-19 NOTE — NURSING NOTE
Is the patient currently in the state of [place]? YES    Visit mode:TELEPHONE    If the visit is dropped, the patient can be reconnected by: TELEPHONE VISIT: Phone number: [phone]    Will anyone else be joining the visit? NO      How would you like to obtain your AVS? MyChart    Are changes needed to the allergy or medication list? YES: pt started taking 10mg of melatonin before bed 2 weeks ago    Reason for visit: Telephone    Qnrs not done due to time

## 2023-05-23 ENCOUNTER — THERAPY VISIT (OUTPATIENT)
Dept: PHYSICAL THERAPY | Facility: REHABILITATION | Age: 53
End: 2023-05-23

## 2023-05-23 DIAGNOSIS — G24.3 CERVICAL DYSTONIA: Primary | ICD-10-CM

## 2023-05-23 PROCEDURE — 97140 MANUAL THERAPY 1/> REGIONS: CPT | Mod: GP | Performed by: PHYSICAL THERAPIST

## 2023-06-03 NOTE — PROGRESS NOTES
.  [facility] acute hospital   PM&R clinic note        Interval history:     Najma Carvalho presents to clinic today for follow up reg her rehab needs.  She has h/o Right torticollis and bilateral laterocollis   Was last seen in clinic  for chemodenervation  Recommendations included follow up post procedure in 6 weeks    Medical issues since last visit,  Since the intervention, patient's neck discomfort has been better. Occasional dystonic posture more in the right side.  Had one episode of right neck pain while being in theater that resolved by itself    PT is doing dry needling and ROM.    Functionally, no change in her history    Social history is unchanged,     Medications:  Current Outpatient Medications   Medication Sig Dispense Refill     busPIRone (BUSPAR) 10 MG tablet Take 1 tablet (10 mg) by mouth 2 times daily 60 tablet 3     Cholecalciferol (VITAMIN D3 PO) Take 4,000 Units by mouth daily       clonazePAM (KLONOPIN) 1 MG tablet Take 1 tablet (1 mg) by mouth 2 times daily Fill May 29 60 tablet 0     ibuprofen (ADVIL/MOTRIN) 200 MG tablet Take 200 mg by mouth every 4 hours as needed for pain       IRON-VITAMIN C PO Iron 65 mg elemental - vitamin 125 m tab a few days per wekk       lamoTRIgine (LAMICTAL) 200 MG tablet        loratadine-pseudoePHEDrine (CLARITIN-D 24-HOUR)  MG 24 hr tablet Take 1 tablet by mouth daily 90 tablet 3     Magnesium Oxide 250 MG TABS Take 1 tablet by mouth daily       medroxyPROGESTERone (PROVERA) 10 MG tablet Sig: one tab every day for 10 days each month 30 tablet 3     QUEtiapine (SEROQUEL) 50 MG tablet Take 1 tablet (50 mg) by mouth At Bedtime 30 tablet 5     tiZANidine (ZANAFLEX) 4 MG tablet Take 0.5-1 tablets (2-4 mg) by mouth 3 times daily as needed for muscle spasms (90) 90 tablet 1              Physical Exam:   LMP 2023 (Exact Date)   Gen: NAD, pleasant and cooperative     Neuro/MSK:   Normal strength testing in bilateral UEs and  LEs.      Labs/Imaging:  Lab Results   Component Value Date    WBC 4.9 02/04/2023    HGB 14.6 02/04/2023    HCT 43.1 02/04/2023    MCV 90 02/04/2023     02/04/2023     Lab Results   Component Value Date     02/04/2023    POTASSIUM 3.7 02/04/2023    CHLORIDE 107 02/04/2023    CO2 23 02/04/2023     02/04/2023     Lab Results   Component Value Date    GFRESTIMATED 85 02/04/2023     Lab Results   Component Value Date    AST 17 05/07/2019    ALT 15 05/07/2019    ALKPHOS 81 05/07/2019    BILITOTAL 0.4 05/07/2019     Lab Results   Component Value Date    INR 0.96 02/04/2023     Lab Results   Component Value Date    BUN 11 02/04/2023    CR 0.83 02/04/2023              Assessment/Plan     .(M54.2) Neck pain  (primary encounter diagnosis)      1. Interventions:     Plan would be : Right torticollis and bilateral laterocollis     Right Sternocleidomastoid 40 units in 2 sites  Right Levator Scapula 20 units in one site  Right Trapeizus 80 units in four sites        Left Sternocleidomastoid 10 units in one   Left Levator Scapula 10 units in one site  Left Trapeizus 40 units in two sites    2. Follow up: 6 weeks for the next set of dystonia.      Kimmy Matthews MD  Physical Medicine & Rehabilitation      60 minutes spent on the date of the encounter doing chart review, history and exam, documentation and further activities as noted above

## 2023-06-06 ENCOUNTER — OFFICE VISIT (OUTPATIENT)
Dept: PHYSICAL MEDICINE AND REHAB | Facility: CLINIC | Age: 53
End: 2023-06-06

## 2023-06-06 DIAGNOSIS — M54.2 NECK PAIN: Primary | ICD-10-CM

## 2023-06-06 PROCEDURE — 99215 OFFICE O/P EST HI 40 MIN: CPT | Performed by: PHYSICAL MEDICINE & REHABILITATION
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
## 2023-06-06 NOTE — LETTER
2023         RE: Najma Carvalho  [address] [place] [id number]        Dear Colleague,    Thank you for referring your patient, Najma Carvalho, to the [facility]. Please see a copy of my visit note below.    .  [facility]   PM&R clinic note        Interval history:     Najma Carvalho presents to clinic today for follow up reg her rehab needs.  She has h/o Right torticollis and bilateral laterocollis   Was last seen in clinic  for chemodenervation  Recommendations included follow up post procedure in 6 weeks    Medical issues since last visit,  Since the intervention, patient's neck discomfort has been better. Occasional dystonic posture more in the right side.  Had one episode of right neck pain while being in theater that resolved by itself    PT is doing dry needling and ROM.    Functionally, no change in her history    Social history is unchanged,     Medications:  Current Outpatient Medications   Medication Sig Dispense Refill     busPIRone (BUSPAR) 10 MG tablet Take 1 tablet (10 mg) by mouth 2 times daily 60 tablet 3     Cholecalciferol (VITAMIN D3 PO) Take 4,000 Units by mouth daily       clonazePAM (KLONOPIN) 1 MG tablet Take 1 tablet (1 mg) by mouth 2 times daily Fill May 29 60 tablet 0     ibuprofen (ADVIL/MOTRIN) 200 MG tablet Take 200 mg by mouth every 4 hours as needed for pain       IRON-VITAMIN C PO Iron 65 mg elemental - vitamin 125 m tab a few days per wekk       lamoTRIgine (LAMICTAL) 200 MG tablet        loratadine-pseudoePHEDrine (CLARITIN-D 24-HOUR)  MG 24 hr tablet Take 1 tablet by mouth daily 90 tablet 3     Magnesium Oxide 250 MG TABS Take 1 tablet by mouth daily       medroxyPROGESTERone (PROVERA) 10 MG tablet Sig: one tab every day for 10 days each month 30 tablet 3     QUEtiapine (SEROQUEL) 50 MG tablet Take 1 tablet (50 mg) by mouth At Bedtime 30 tablet 5     tiZANidine (ZANAFLEX) 4  MG tablet Take 0.5-1 tablets (2-4 mg) by mouth 3 times daily as needed for muscle spasms (90) 90 tablet 1              Physical Exam:   LMP 04/05/2023 (Exact Date)   Gen: NAD, pleasant and cooperative     Neuro/MSK:   Normal strength testing in bilateral UEs and LEs.      Labs/Imaging:  Lab Results   Component Value Date    WBC 4.9 02/04/2023    HGB 14.6 02/04/2023    HCT 43.1 02/04/2023    MCV 90 02/04/2023     02/04/2023     Lab Results   Component Value Date     02/04/2023    POTASSIUM 3.7 02/04/2023    CHLORIDE 107 02/04/2023    CO2 23 02/04/2023     02/04/2023     Lab Results   Component Value Date    GFRESTIMATED 85 02/04/2023     Lab Results   Component Value Date    AST 17 05/07/2019    ALT 15 05/07/2019    ALKPHOS 81 05/07/2019    BILITOTAL 0.4 05/07/2019     Lab Results   Component Value Date    INR 0.96 02/04/2023     Lab Results   Component Value Date    BUN 11 02/04/2023    CR 0.83 02/04/2023              Assessment/Plan     .(M54.2) Neck pain  (primary encounter diagnosis)      1. Interventions:     Plan would be : Right torticollis and bilateral laterocollis     Right Sternocleidomastoid 40 units in 2 sites  Right Levator Scapula 20 units in one site  Right Trapeizus 80 units in four sites        Left Sternocleidomastoid 10 units in one   Left Levator Scapula 10 units in one site  Left Trapeizus 40 units in two sites    2. Follow up: 6 weeks for the next set of dystonia.      Kimmy Matthews MD  Physical Medicine & Rehabilitation      60 minutes spent on the date of the encounter doing chart review, history and exam, documentation and further activities as noted above            Again, thank you for allowing me to participate in the care of your patient.        Sincerely,        Kimmy Matthews MD

## 2023-06-08 ENCOUNTER — THERAPY VISIT (OUTPATIENT)
Dept: PHYSICAL THERAPY | Facility: REHABILITATION | Age: 53
End: 2023-06-08

## 2023-06-08 DIAGNOSIS — G24.3 CERVICAL DYSTONIA: Primary | ICD-10-CM

## 2023-06-08 PROCEDURE — 97110 THERAPEUTIC EXERCISES: CPT | Mod: GP | Performed by: PHYSICAL THERAPIST

## 2023-06-08 PROCEDURE — 97140 MANUAL THERAPY 1/> REGIONS: CPT | Mod: GP | Performed by: PHYSICAL THERAPIST

## 2023-06-15 ENCOUNTER — THERAPY VISIT (OUTPATIENT)
Dept: PHYSICAL THERAPY | Facility: REHABILITATION | Age: 53
End: 2023-06-15

## 2023-06-15 DIAGNOSIS — G24.3 CERVICAL DYSTONIA: Primary | ICD-10-CM

## 2023-06-15 PROCEDURE — 97140 MANUAL THERAPY 1/> REGIONS: CPT | Mod: GP | Performed by: PHYSICAL THERAPIST

## 2023-06-15 PROCEDURE — 97110 THERAPEUTIC EXERCISES: CPT | Mod: GP | Performed by: PHYSICAL THERAPIST

## 2023-07-03 ENCOUNTER — THERAPY VISIT (OUTPATIENT)
Dept: PHYSICAL THERAPY | Facility: REHABILITATION | Age: 53
End: 2023-07-03

## 2023-07-03 DIAGNOSIS — G24.3 CERVICAL DYSTONIA: Primary | ICD-10-CM

## 2023-07-03 PROCEDURE — 97140 MANUAL THERAPY 1/> REGIONS: CPT | Mod: GP | Performed by: PHYSICAL THERAPIST

## 2023-07-10 ENCOUNTER — TELEPHONE (OUTPATIENT)
Dept: PHYSICAL MEDICINE AND REHAB | Facility: CLINIC | Age: 53
End: 2023-07-10

## 2023-07-10 ENCOUNTER — THERAPY VISIT (OUTPATIENT)
Dept: PHYSICAL THERAPY | Facility: REHABILITATION | Age: 53
End: 2023-07-10
Payer: COMMERCIAL

## 2023-07-10 DIAGNOSIS — G24.1 DYSTONIA, TORSION, FRAGMENTS OF: Primary | ICD-10-CM

## 2023-07-10 DIAGNOSIS — G24.3 CERVICAL DYSTONIA: Primary | ICD-10-CM

## 2023-07-10 PROCEDURE — 97140 MANUAL THERAPY 1/> REGIONS: CPT | Mod: GP | Performed by: PHYSICAL THERAPIST

## 2023-07-10 PROCEDURE — 97110 THERAPEUTIC EXERCISES: CPT | Mod: GP | Performed by: PHYSICAL THERAPIST

## 2023-07-10 NOTE — TELEPHONE ENCOUNTER
New botox order needs to be placed. Previous order was only for 1 dose.     Thank you, please let me know when you've placed.     Surya Connelly RN, BSN  [facility]

## 2023-07-17 ENCOUNTER — THERAPY VISIT (OUTPATIENT)
Dept: PHYSICAL THERAPY | Facility: REHABILITATION | Age: 53
End: 2023-07-17

## 2023-07-17 DIAGNOSIS — G24.3 CERVICAL DYSTONIA: Primary | ICD-10-CM

## 2023-07-17 PROCEDURE — 97140 MANUAL THERAPY 1/> REGIONS: CPT | Mod: GP | Performed by: PHYSICAL THERAPIST

## 2023-07-17 PROCEDURE — 97110 THERAPEUTIC EXERCISES: CPT | Mod: GP | Performed by: PHYSICAL THERAPIST

## 2023-07-19 ENCOUNTER — OFFICE VISIT (OUTPATIENT)
Dept: PHYSICAL MEDICINE AND REHAB | Facility: CLINIC | Age: 53
End: 2023-07-19
Payer: COMMERCIAL

## 2023-07-19 VITALS — DIASTOLIC BLOOD PRESSURE: 93 MMHG | HEART RATE: 94 BPM | SYSTOLIC BLOOD PRESSURE: 146 MMHG

## 2023-07-19 PROCEDURE — 95874 GUIDE NERV DESTR NEEDLE EMG: CPT | Performed by: PHYSICAL MEDICINE & REHABILITATION

## 2023-07-19 PROCEDURE — 64616 CHEMODENERV MUSC NECK DYSTON: CPT | Mod: 50 | Performed by: PHYSICAL MEDICINE & REHABILITATION

## 2023-07-19 NOTE — LETTER
2023         RE: Najma Carvalho  [address] [place] [id number]        Dear Colleague,    Thank you for referring your patient, Najma Carvalho, to the [facility]. Please see a copy of my visit note below.    .  [facility]   PM&R clinic note        Interval history:     Najma Carvalho presents to clinic today for follow up reg her rehab needs.  She has h/o Right torticollis and bilateral laterocollis   Was last seen in clinic  for chemodenervation. Seen in 6 weeks follow up and plan to continue chemodenervation to manage cervical dystonia.    Medical issues since last visit,  No concerns. No recent hospitalizations or new illness.    Social history is unchanged,     Medications:  Current Outpatient Medications   Medication Sig Dispense Refill     busPIRone (BUSPAR) 10 MG tablet Take 1 tablet (10 mg) by mouth 2 times daily 60 tablet 3     Cholecalciferol (VITAMIN D3 PO) Take 4,000 Units by mouth daily       clonazePAM (KLONOPIN) 1 MG tablet Take 1 tablet (1 mg) by mouth 2 times daily 60 tablet 0     ibuprofen (ADVIL/MOTRIN) 200 MG tablet Take 200 mg by mouth every 4 hours as needed for pain       IRON-VITAMIN C PO Iron 65 mg elemental - vitamin 125 m tab a few days per wekk       lamoTRIgine (LAMICTAL) 200 MG tablet        Magnesium Oxide 250 MG TABS Take 1 tablet by mouth daily       medroxyPROGESTERone (PROVERA) 10 MG tablet Sig: one tab every day for 10 days each month 30 tablet 3     QUEtiapine (SEROQUEL) 50 MG tablet Take 1 tablet (50 mg) by mouth At Bedtime 30 tablet 5     tiZANidine (ZANAFLEX) 4 MG tablet Take 0.5-1 tablets (2-4 mg) by mouth 3 times daily as needed for muscle spasms (90) 90 tablet 1              Physical Exam:   There were no vitals taken for this visit.  Gen: NAD, pleasant and cooperative   Neuro/MSK:   Neck exam showed chin rotated to the right side with lateral deviation to the right. SCM  tightness at the proximal insertion was noted.  Normal active ROM in neck directions: the following: flex, ext, sideways and rotation with limited rotation to the right side    No change in exam      Labs/Imaging:  Lab Results   Component Value Date    WBC 4.9 02/04/2023    HGB 14.6 02/04/2023    HCT 43.1 02/04/2023    MCV 90 02/04/2023     02/04/2023     Lab Results   Component Value Date     02/04/2023    POTASSIUM 3.7 02/04/2023    CHLORIDE 107 02/04/2023    CO2 23 02/04/2023     02/04/2023     Lab Results   Component Value Date    GFRESTIMATED 85 02/04/2023     Lab Results   Component Value Date    AST 17 05/07/2019    ALT 15 05/07/2019    ALKPHOS 81 05/07/2019    BILITOTAL 0.4 05/07/2019     Lab Results   Component Value Date    INR 0.96 02/04/2023     Lab Results   Component Value Date    BUN 11 02/04/2023    CR 0.83 02/04/2023              Assessment/Plan     .(O62.2) Cervical dystocia  (primary encounter diagnosis)      1. Interventions: see procedure note    2. Follow up: 6 weeks post chemodenervation for follow up      Kimmy Matthews MD  Physical Medicine & Rehabilitation      80 minutes spent on the date of the encounter doing chart review, history and exam, documentation and further activities as noted above          ?PROCEDURE: Cervical Dystonia management with chemodenervation?        Dr. Matthews    DIAGNOSIS: Right torticollis and bilateral laterocollis     PROCEDURE NOTE: Procedure note A written consent was obtained from the patient explaining the risks and benefits of the procedure. After reconstituting 1:1 using 1 cc of preservative free Normal Saline with 200 Units vial and using EMG guidance I injected the following:     Right Sternocleidomastoid 40 units in 2 sites  Right Levator Scapula 20 units in one site  Right Trapeizus 80 units in four sites        Left Sternocleidomastoid 10 units in one   Left Levator Scapula 10 units in one site  Left Trapeizus 40 units  in two sites      Units discarded 0 units    Lot # ([id number]) x2  Exp date (05/2025) x2     The patient tolerated the procedure well.  Post procedure care plan was explained to the patient  Follow up in 6 weeks post chemodenervation? evaluation.      Again, thank you for allowing me to participate in the care of your patient.        Sincerely,        Kimmy Matthews MD

## 2023-07-19 NOTE — PROGRESS NOTES
?PROCEDURE: Cervical Dystonia management with chemodenervation? with onabotulinum toxin A        Dr. Matthews    DIAGNOSIS: Right torticollis and bilateral laterocollis     PROCEDURE NOTE: Procedure note A written consent was obtained from the patient explaining the risks and benefits of the procedure. After reconstituting 1:1 using 1 cc of preservative free Normal Saline with 200 Units vial and using EMG guidance I injected the following:     Right Sternocleidomastoid 40 units in 2 sites  Right Levator Scapula 20 units in one site  Right Trapeizus 80 units in four sites        Left Sternocleidomastoid 10 units in one   Left Levator Scapula 10 units in one site  Left Trapeizus 40 units in two sites      Units discarded 0 units    Lot # ([id number]) x2  Exp date (05/2025) x2     The patient tolerated the procedure well.  Post procedure care plan was explained to the patient  Follow up in 6 weeks post chemodenervation? evaluation.

## 2023-07-19 NOTE — PROGRESS NOTES
.  [facility]   PM&R clinic note        Interval history:     Najma Carvalho presents to clinic today for follow up reg her rehab needs.  She has h/o Right torticollis and bilateral laterocollis   Was last seen in clinic  for chemodenervation. Seen in 6 weeks follow up and plan to continue chemodenervation to manage cervical dystonia.    Medical issues since last visit,  No concerns. No recent hospitalizations or new illness.    Social history is unchanged,     Medications:  Current Outpatient Medications   Medication Sig Dispense Refill    busPIRone (BUSPAR) 10 MG tablet Take 1 tablet (10 mg) by mouth 2 times daily 60 tablet 3    Cholecalciferol (VITAMIN D3 PO) Take 4,000 Units by mouth daily      clonazePAM (KLONOPIN) 1 MG tablet Take 1 tablet (1 mg) by mouth 2 times daily 60 tablet 0    ibuprofen (ADVIL/MOTRIN) 200 MG tablet Take 200 mg by mouth every 4 hours as needed for pain      IRON-VITAMIN C PO Iron 65 mg elemental - vitamin 125 m tab a few days per wekk      lamoTRIgine (LAMICTAL) 200 MG tablet       Magnesium Oxide 250 MG TABS Take 1 tablet by mouth daily      medroxyPROGESTERone (PROVERA) 10 MG tablet Sig: one tab every day for 10 days each month 30 tablet 3    QUEtiapine (SEROQUEL) 50 MG tablet Take 1 tablet (50 mg) by mouth At Bedtime 30 tablet 5    tiZANidine (ZANAFLEX) 4 MG tablet Take 0.5-1 tablets (2-4 mg) by mouth 3 times daily as needed for muscle spasms (90) 90 tablet 1              Physical Exam:   There were no vitals taken for this visit.  Gen: NAD, pleasant and cooperative   Neuro/MSK:   Neck exam showed chin rotated to the right side with lateral deviation to the right. SCM tightness at the proximal insertion was noted.  Normal active ROM in neck directions: the following: flex, ext, sideways and rotation with limited rotation to the right side    No change in exam      Labs/Imaging:  Lab Results   Component Value Date    WBC 4.9 2023     HGB 14.6 02/04/2023    HCT 43.1 02/04/2023    MCV 90 02/04/2023     02/04/2023     Lab Results   Component Value Date     02/04/2023    POTASSIUM 3.7 02/04/2023    CHLORIDE 107 02/04/2023    CO2 23 02/04/2023     02/04/2023     Lab Results   Component Value Date    GFRESTIMATED 85 02/04/2023     Lab Results   Component Value Date    AST 17 05/07/2019    ALT 15 05/07/2019    ALKPHOS 81 05/07/2019    BILITOTAL 0.4 05/07/2019     Lab Results   Component Value Date    INR 0.96 02/04/2023     Lab Results   Component Value Date    BUN 11 02/04/2023    CR 0.83 02/04/2023              Assessment/Plan     .(O62.2) Cervical dystocia  (primary encounter diagnosis)      Interventions: see procedure note of cervical dystonia management with 200 units of onabotulinum toxin A    Follow up: 6 weeks post chemodenervation for follow up      Kimmy Matthews MD  Physical Medicine & Rehabilitation      80 minutes spent on the date of the encounter doing chart review, history and exam, documentation, in which 20 min was for the procedure

## 2023-07-19 NOTE — NURSING NOTE
Chief Complaint   Patient presents with     Botox     Cervical dystonia     Kathy Lindsey MA on 7/19/2023 at 3:25 PM

## 2023-07-20 ENCOUNTER — TELEPHONE (OUTPATIENT)
Dept: PHYSICAL MEDICINE AND REHAB | Facility: CLINIC | Age: 53
End: 2023-07-20

## 2023-07-20 NOTE — TELEPHONE ENCOUNTER
"Dr. Matthews,    In your notes from yesterday's visit stated \"Follow up: 6 weeks post chemodenervation for follow up\"    Can you please confirm if the pt's next appt is a follow up or is it a return Botox visit.     Thank you.      CHESTER Quiros on 7/20/2023 at 7:54 AM    "

## 2023-07-20 NOTE — TELEPHONE ENCOUNTER
----- Message from Rajani Rene sent at 7/19/2023  4:19 PM CDT -----  Is Pt's next appt just a follow up as Pt stated,  or is it supposed to be a Botox /Neurotoxin appointment type? Pls let scheduling know if appt type needs to be changed.  ThanksRajani

## 2023-07-24 ENCOUNTER — THERAPY VISIT (OUTPATIENT)
Dept: PHYSICAL THERAPY | Facility: REHABILITATION | Age: 53
End: 2023-07-24

## 2023-07-24 DIAGNOSIS — G24.3 CERVICAL DYSTONIA: Primary | ICD-10-CM

## 2023-07-24 PROCEDURE — 97140 MANUAL THERAPY 1/> REGIONS: CPT | Mod: GP | Performed by: PHYSICAL THERAPIST

## 2023-07-24 PROCEDURE — 97110 THERAPEUTIC EXERCISES: CPT | Mod: GP | Performed by: PHYSICAL THERAPIST

## 2023-07-31 ENCOUNTER — OFFICE VISIT (OUTPATIENT)
Dept: PSYCHIATRY | Facility: CLINIC | Age: 53
End: 2023-07-31

## 2023-07-31 VITALS
SYSTOLIC BLOOD PRESSURE: 140 MMHG | WEIGHT: 194.12 LBS | BODY MASS INDEX: 29.52 KG/M2 | DIASTOLIC BLOOD PRESSURE: 67 MMHG | HEART RATE: 90 BPM

## 2023-07-31 DIAGNOSIS — F31.62 BIPOLAR DISORDER, CURRENT EPISODE MIXED, MODERATE (H): Primary | ICD-10-CM

## 2023-07-31 DIAGNOSIS — F41.1 GAD (GENERALIZED ANXIETY DISORDER): ICD-10-CM

## 2023-07-31 PROCEDURE — 99214 OFFICE O/P EST MOD 30 MIN: CPT | Performed by: NURSE PRACTITIONER

## 2023-07-31 RX ORDER — CLONAZEPAM 1 MG/1
1 TABLET ORAL 2 TIMES DAILY
Qty: 180 TABLET | Refills: 0 | Status: SHIPPED | OUTPATIENT
Start: 2023-07-31 | End: 2023-10-05

## 2023-07-31 RX ORDER — BUSPIRONE HYDROCHLORIDE 15 MG/1
15 TABLET ORAL 2 TIMES DAILY
Qty: 180 TABLET | Refills: 0 | Status: SHIPPED | OUTPATIENT
Start: 2023-07-31 | End: 2023-10-04

## 2023-07-31 RX ORDER — LAMOTRIGINE 200 MG/1
200 TABLET ORAL DAILY
Qty: 90 TABLET | Refills: 0 | Status: SHIPPED | OUTPATIENT
Start: 2023-07-31 | End: 2023-10-04

## 2023-07-31 RX ORDER — QUETIAPINE FUMARATE 50 MG/1
50 TABLET, FILM COATED ORAL AT BEDTIME
Qty: 90 TABLET | Refills: 0 | Status: SHIPPED | OUTPATIENT
Start: 2023-07-31 | End: 2023-10-04

## 2023-07-31 ASSESSMENT — ANXIETY QUESTIONNAIRES
GAD7 TOTAL SCORE: 14
IF YOU CHECKED OFF ANY PROBLEMS ON THIS QUESTIONNAIRE, HOW DIFFICULT HAVE THESE PROBLEMS MADE IT FOR YOU TO DO YOUR WORK, TAKE CARE OF THINGS AT HOME, OR GET ALONG WITH OTHER PEOPLE: SOMEWHAT DIFFICULT
7. FEELING AFRAID AS IF SOMETHING AWFUL MIGHT HAPPEN: SEVERAL DAYS
GAD7 TOTAL SCORE: 14
4. TROUBLE RELAXING: MORE THAN HALF THE DAYS
2. NOT BEING ABLE TO STOP OR CONTROL WORRYING: MORE THAN HALF THE DAYS
6. BECOMING EASILY ANNOYED OR IRRITABLE: NEARLY EVERY DAY
3. WORRYING TOO MUCH ABOUT DIFFERENT THINGS: MORE THAN HALF THE DAYS
1. FEELING NERVOUS, ANXIOUS, OR ON EDGE: NEARLY EVERY DAY
5. BEING SO RESTLESS THAT IT IS HARD TO SIT STILL: SEVERAL DAYS

## 2023-07-31 ASSESSMENT — PATIENT HEALTH QUESTIONNAIRE - PHQ9: SUM OF ALL RESPONSES TO PHQ QUESTIONS 1-9: 9

## 2023-07-31 NOTE — PATIENT INSTRUCTIONS
"Patient Education   The [facility]  What to Expect  Here's what to expect in the [facility].   About the program  You'll be meeting with a psychiatric doctor to check your mental health. A psychiatric doctor helps you deal with troubling thoughts and feelings by giving you medicine. They'll make sure you know the plan for your care. You may see them for a long time. When you're feeling better, they may refer you back to seeing your family doctor.   If you have any questions, we'll be glad to talk to you.  About visits  Be open  At your visits, please talk openly about your problems. It may feel hard, but it's the best way for us to help you.  Cancelling visits  If you can't come to your visit, please call us right away at [phone]. If you don't cancel at least 24 hours (1 full day) before your visit, that's \"late cancellation.\"  Not showing up for your visits  Being very late is the same as not showing up. You'll be a \"no show\" if:  You're more than 15 minutes late for a 30-minute (half hour) visit.  You're more than 30 minutes late for a 60-minute (full hour) visit.  If you cancel late or don't show up 2 times within 6 months, we may end your care.  Getting help between visits  If you need help between visits, you can call us Monday to Friday from 8 a.m. to 4:30 p.m. at [phone].  Emergency care  Call 911 or go to the nearest emergency department if your life or someone else's life is in danger.  Call 988 anytime to reach the national Suicide and Crisis hotline.  Medicine refills  To refill your medicine, call your pharmacy. You can also call [facility]'s [facility] at [phone], Monday to Friday, 8 a.m. to 4:30 p.m. It can take 1 to 3 business days to get a refill.   Forms, letters, and tests  You may have papers to fill out, like FMLA, short-term disability, and workability. We can help you with these forms at your visits, but you must have an " appointment. You may need more than 1 visit for this, to be in an intensive therapy program, or both.  Before we can give you medicine for ADHD, we may refer you to get tested for it or confirm it another way.  We may not be able to give you an emotional support animal letter.  We don't do mental health checks ordered by the court.   We don't do mental health testing, but we can refer you to get tested.   Thank you for choosing us for your care.  For informational purposes only. Not to replace the advice of your health care provider. Copyright   2022 [facility] [employer]. All rights reserved. [employer] 654225 - 12/22.         Treatment Plan:      1.  Increase buspirone to 15  mg twice daily  2.  Continue quetiapine 50 mg at bedtime  3.  Continue Clonazepam 1 mg twice daily but consider decreasing the dose to one tablet in the morning and 1/2 tablet at night.    4.  Continue Lamotrigine 200 mg daily  5.  Continue talk therapy   6.  I will refer you to have MMPI completed      Continue all other medical directions per primary care provider.   Continue all other medications as reviewed per electronic medical record today.   Safety plan reviewed. To the Emergency Department as needed or call after hours crisis line at [phone] or [phone]. [place] Crisis Text Line: Text [place] to 698542  or  Suicide LifeLine Chat: suicidepreventionlifeline.org/chat/  To schedule individual or family therapy, call [facility] at [phone].   Schedule an appointment with me in 8 weeks or sooner as needed.  Call [facility] at [phone] to schedule.  Follow up with primary care provider as planned or for acute medical concerns.  Call the psychiatric nurse line with medication questions or concerns at [phone].  MyChart may be used to communicate with your provider, but this is not intended to be used for emergencies.    Crisis Resources:    National Suicide Prevention Lifeline:  246.897.5465 (TTY: 580.677.8221). Call anytime for help.  (www.suicidepreventionlifeline.org)  National [place] on Mental Illness (www.feliciano.org): 785.612.1591 or 986-932-4248.   Mental Health Association (www.mentalhealth.org): 904.629.4678 or 602-401-8492.  [place] Crisis Text Line: Text [place] to 089914  Suicide LifeLine Chat: suicidepreNext One's On Me (NOOM)line.org/chat    Overinclusive/Illogical/Impaired reasoning/Disorganized/Circumstantial
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
## 2023-08-03 ENCOUNTER — THERAPY VISIT (OUTPATIENT)
Dept: PHYSICAL THERAPY | Facility: REHABILITATION | Age: 53
End: 2023-08-03
Payer: COMMERCIAL

## 2023-08-03 DIAGNOSIS — G24.3 CERVICAL DYSTONIA: Primary | ICD-10-CM

## 2023-08-03 PROCEDURE — 97140 MANUAL THERAPY 1/> REGIONS: CPT | Mod: GP | Performed by: PHYSICAL THERAPIST

## 2023-08-03 PROCEDURE — 97110 THERAPEUTIC EXERCISES: CPT | Mod: GP | Performed by: PHYSICAL THERAPIST

## 2023-08-08 ENCOUNTER — THERAPY VISIT (OUTPATIENT)
Dept: PHYSICAL THERAPY | Facility: REHABILITATION | Age: 53
End: 2023-08-08

## 2023-08-08 DIAGNOSIS — G24.3 CERVICAL DYSTONIA: Primary | ICD-10-CM

## 2023-08-08 PROCEDURE — 97140 MANUAL THERAPY 1/> REGIONS: CPT | Mod: GP | Performed by: PHYSICAL THERAPIST

## 2023-08-08 PROCEDURE — 97110 THERAPEUTIC EXERCISES: CPT | Mod: GP | Performed by: PHYSICAL THERAPIST

## 2023-08-23 ENCOUNTER — VIRTUAL VISIT (OUTPATIENT)
Dept: PHYSICAL THERAPY | Facility: REHABILITATION | Age: 53
End: 2023-08-23
Payer: COMMERCIAL

## 2023-08-23 DIAGNOSIS — G24.3 CERVICAL DYSTONIA: Primary | ICD-10-CM

## 2023-08-23 PROCEDURE — 99207 PR NO CHARGE LOS: CPT | Mod: GP | Performed by: PHYSICAL THERAPIST

## 2023-08-24 ENCOUNTER — MYC MEDICAL ADVICE (OUTPATIENT)
Dept: PSYCHIATRY | Facility: CLINIC | Age: 53
End: 2023-08-24

## 2023-08-25 NOTE — TELEPHONE ENCOUNTER
RN attempted to call patient to relay Jennie Briones's message. LVM to call the clinic back at [phone]. RN sent a HotClickVideo message to inform patient of instructions from Jennie.
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
## 2023-08-28 NOTE — TELEPHONE ENCOUNTER
"RN called patient and spoke with her to relay Jennie Briones's message. She reports that she read the mychart message over the weekend and since reducing the dose she feels \"so much better.\" No heartburn and \"I'm not wanting to bite everyone's head off.\"    JULISSA DURAN RN on 8/28/2023 at 1:40 PM    "

## 2023-08-29 ENCOUNTER — THERAPY VISIT (OUTPATIENT)
Dept: PHYSICAL THERAPY | Facility: REHABILITATION | Age: 53
End: 2023-08-29

## 2023-08-29 DIAGNOSIS — G24.3 CERVICAL DYSTONIA: Primary | ICD-10-CM

## 2023-08-29 PROCEDURE — 97110 THERAPEUTIC EXERCISES: CPT | Mod: GP | Performed by: PHYSICAL THERAPIST

## 2023-08-29 PROCEDURE — 97140 MANUAL THERAPY 1/> REGIONS: CPT | Mod: GP | Performed by: PHYSICAL THERAPIST

## 2023-09-01 ENCOUNTER — TRANSFERRED RECORDS (OUTPATIENT)
Dept: HEALTH INFORMATION MANAGEMENT | Facility: CLINIC | Age: 53
End: 2023-09-01

## 2023-09-08 ENCOUNTER — THERAPY VISIT (OUTPATIENT)
Dept: PHYSICAL THERAPY | Facility: REHABILITATION | Age: 53
End: 2023-09-08

## 2023-09-08 DIAGNOSIS — G24.3 CERVICAL DYSTONIA: Primary | ICD-10-CM

## 2023-09-08 PROCEDURE — 97140 MANUAL THERAPY 1/> REGIONS: CPT | Mod: GP | Performed by: PHYSICAL THERAPIST

## 2023-09-08 PROCEDURE — 97110 THERAPEUTIC EXERCISES: CPT | Mod: GP | Performed by: PHYSICAL THERAPIST

## 2023-09-10 NOTE — PROGRESS NOTES
.[facility]   PM&R clinic note        Interval history:     Najma Carvalho presents to clinic today for follow up reg her rehab needs.   She has h/o Right torticollis and bilateral laterocollis   Was last seen in clinic  for chemodenervation. This is her 6 weeks follow up.    Medical issues since last visit,    Patient is satisfied with the last dose. Making gains with PT.     Social history is unchanged,       Medications:  Current Outpatient Medications   Medication Sig Dispense Refill    busPIRone (BUSPAR) 15 MG tablet Take 1 tablet (15 mg) by mouth 2 times daily 180 tablet 0    Cholecalciferol (VITAMIN D3 PO) Take 4,000 Units by mouth daily      clonazePAM (KLONOPIN) 1 MG tablet Take 1 tablet (1 mg) by mouth 2 times daily 180 tablet 0    ibuprofen (ADVIL/MOTRIN) 200 MG tablet Take 200 mg by mouth every 4 hours as needed for pain      IRON-VITAMIN C PO Iron 65 mg elemental - vitamin 125 m tab a few days per wekk      lamoTRIgine (LAMICTAL) 200 MG tablet Take 1 tablet (200 mg) by mouth daily 90 tablet 0    Magnesium Oxide 250 MG TABS Take 1 tablet by mouth daily      medroxyPROGESTERone (PROVERA) 10 MG tablet Sig: one tab every day for 10 days each month 30 tablet 3    QUEtiapine (SEROQUEL) 50 MG tablet Take 1 tablet (50 mg) by mouth At Bedtime 90 tablet 0    tiZANidine (ZANAFLEX) 4 MG tablet Take 0.5-1 tablets (2-4 mg) by mouth 3 times daily as needed for muscle spasms (90) 90 tablet 1              Physical Exam:   LMP 2023 (Approximate)   Gen: NAD, pleasant and cooperative   Neuro/MSK:   Normal complete neuro exam.    Labs/Imaging:  Lab Results   Component Value Date    WBC 4.9 2023    HGB 14.6 2023    HCT 43.1 2023    MCV 90 2023     2023     Lab Results   Component Value Date     2023    POTASSIUM 3.7 2023    CHLORIDE 107 2023    CO2 23 2023     2023     Lab Results   Component  Value Date    GFRESTIMATED 85 02/04/2023     Lab Results   Component Value Date    AST 17 05/07/2019    ALT 15 05/07/2019    ALKPHOS 81 05/07/2019    BILITOTAL 0.4 05/07/2019     Lab Results   Component Value Date    INR 0.96 02/04/2023     Lab Results   Component Value Date    BUN 11 02/04/2023    CR 0.83 02/04/2023              Assessment/Plan     .(G24.3) Cervical dystonia  (primary encounter diagnosis)      Therapy/equipment/braces: continue therapy    Follow up: 6 weeks for BOTOX. Use the same dose for the next time.      Kimym Matthews MD  Physical Medicine & Rehabilitation      90 minutes spent on the date of the encounter doing chart review, history and exam, documentation and further activities as noted above

## 2023-09-11 ENCOUNTER — TELEPHONE (OUTPATIENT)
Dept: PHYSICAL THERAPY | Facility: REHABILITATION | Age: 53
End: 2023-09-11

## 2023-09-13 ENCOUNTER — OFFICE VISIT (OUTPATIENT)
Dept: PHYSICAL MEDICINE AND REHAB | Facility: CLINIC | Age: 53
End: 2023-09-13
Payer: COMMERCIAL

## 2023-09-13 VITALS — SYSTOLIC BLOOD PRESSURE: 128 MMHG | DIASTOLIC BLOOD PRESSURE: 81 MMHG | HEART RATE: 79 BPM

## 2023-09-13 DIAGNOSIS — G24.3 CERVICAL DYSTONIA: Primary | ICD-10-CM

## 2023-09-13 PROCEDURE — 99417 PROLNG OP E/M EACH 15 MIN: CPT | Performed by: PHYSICAL MEDICINE & REHABILITATION

## 2023-09-13 PROCEDURE — 99215 OFFICE O/P EST HI 40 MIN: CPT | Performed by: PHYSICAL MEDICINE & REHABILITATION

## 2023-09-13 NOTE — NURSING NOTE
Chief Complaint   Patient presents with    Follow Up     Botox follow up   Last injection working really well

## 2023-09-13 NOTE — LETTER
2023         RE: Najma Carvalho  [address] [place] [id number]        Dear Colleague,    Thank you for referring your patient, Najma Carvalho, to the [facility]. Please see a copy of my visit note below.    .[facility]   PM&R clinic note        Interval history:     Najma Carvalho presents to clinic today for follow up reg her rehab needs.   She has h/o Right torticollis and bilateral laterocollis   Was last seen in clinic  for chemodenervation. This is her 6 weeks follow up.    Medical issues since last visit,    Patient is satisfied with the last dose. Making gains with PT.     Social history is unchanged,       Medications:  Current Outpatient Medications   Medication Sig Dispense Refill     busPIRone (BUSPAR) 15 MG tablet Take 1 tablet (15 mg) by mouth 2 times daily 180 tablet 0     Cholecalciferol (VITAMIN D3 PO) Take 4,000 Units by mouth daily       clonazePAM (KLONOPIN) 1 MG tablet Take 1 tablet (1 mg) by mouth 2 times daily 180 tablet 0     ibuprofen (ADVIL/MOTRIN) 200 MG tablet Take 200 mg by mouth every 4 hours as needed for pain       IRON-VITAMIN C PO Iron 65 mg elemental - vitamin 125 m tab a few days per wekk       lamoTRIgine (LAMICTAL) 200 MG tablet Take 1 tablet (200 mg) by mouth daily 90 tablet 0     Magnesium Oxide 250 MG TABS Take 1 tablet by mouth daily       medroxyPROGESTERone (PROVERA) 10 MG tablet Sig: one tab every day for 10 days each month 30 tablet 3     QUEtiapine (SEROQUEL) 50 MG tablet Take 1 tablet (50 mg) by mouth At Bedtime 90 tablet 0     tiZANidine (ZANAFLEX) 4 MG tablet Take 0.5-1 tablets (2-4 mg) by mouth 3 times daily as needed for muscle spasms (90) 90 tablet 1              Physical Exam:   LMP 2023 (Approximate)   Gen: NAD, pleasant and cooperative   Neuro/MSK:   Normal complete neuro exam.    Labs/Imaging:  Lab Results   Component Value Date    WBC 4.9 2023     HGB 14.6 02/04/2023    HCT 43.1 02/04/2023    MCV 90 02/04/2023     02/04/2023     Lab Results   Component Value Date     02/04/2023    POTASSIUM 3.7 02/04/2023    CHLORIDE 107 02/04/2023    CO2 23 02/04/2023     02/04/2023     Lab Results   Component Value Date    GFRESTIMATED 85 02/04/2023     Lab Results   Component Value Date    AST 17 05/07/2019    ALT 15 05/07/2019    ALKPHOS 81 05/07/2019    BILITOTAL 0.4 05/07/2019     Lab Results   Component Value Date    INR 0.96 02/04/2023     Lab Results   Component Value Date    BUN 11 02/04/2023    CR 0.83 02/04/2023              Assessment/Plan     .(G24.3) Cervical dystonia  (primary encounter diagnosis)      Therapy/equipment/braces: continue therapy    Follow up: 6 weeks for BOTOX. Use the same dose for the next time.      Kimmy Matthews MD  Physical Medicine & Rehabilitation      90 minutes spent on the date of the encounter doing chart review, history and exam, documentation and further activities as noted above          Again, thank you for allowing me to participate in the care of your patient.        Sincerely,        Kimmy Matthews MD

## 2023-09-18 ENCOUNTER — THERAPY VISIT (OUTPATIENT)
Dept: PHYSICAL THERAPY | Facility: REHABILITATION | Age: 53
End: 2023-09-18
Payer: COMMERCIAL

## 2023-09-18 DIAGNOSIS — G24.3 CERVICAL DYSTONIA: Primary | ICD-10-CM

## 2023-09-18 PROCEDURE — 97140 MANUAL THERAPY 1/> REGIONS: CPT | Mod: GP | Performed by: PHYSICAL THERAPIST

## 2023-09-18 PROCEDURE — 97110 THERAPEUTIC EXERCISES: CPT | Mod: GP | Performed by: PHYSICAL THERAPIST

## 2023-09-19 ENCOUNTER — MYC MEDICAL ADVICE (OUTPATIENT)
Dept: PSYCHIATRY | Facility: CLINIC | Age: 53
End: 2023-09-19

## 2023-10-03 ENCOUNTER — THERAPY VISIT (OUTPATIENT)
Dept: PHYSICAL THERAPY | Facility: REHABILITATION | Age: 53
End: 2023-10-03
Payer: COMMERCIAL

## 2023-10-03 DIAGNOSIS — G24.3 CERVICAL DYSTONIA: Primary | ICD-10-CM

## 2023-10-03 PROCEDURE — 97140 MANUAL THERAPY 1/> REGIONS: CPT | Mod: GP | Performed by: PHYSICAL THERAPIST

## 2023-10-03 PROCEDURE — 97110 THERAPEUTIC EXERCISES: CPT | Mod: GP | Performed by: PHYSICAL THERAPIST

## 2023-10-05 ENCOUNTER — OFFICE VISIT (OUTPATIENT)
Dept: PSYCHIATRY | Facility: CLINIC | Age: 53
End: 2023-10-05

## 2023-10-05 VITALS
HEART RATE: 65 BPM | BODY MASS INDEX: 30.41 KG/M2 | SYSTOLIC BLOOD PRESSURE: 133 MMHG | DIASTOLIC BLOOD PRESSURE: 69 MMHG | WEIGHT: 200 LBS

## 2023-10-05 DIAGNOSIS — F41.1 GAD (GENERALIZED ANXIETY DISORDER): ICD-10-CM

## 2023-10-05 DIAGNOSIS — F41.0 PANIC ATTACK: ICD-10-CM

## 2023-10-05 DIAGNOSIS — F31.62 BIPOLAR DISORDER, CURRENT EPISODE MIXED, MODERATE (H): Primary | ICD-10-CM

## 2023-10-05 PROCEDURE — 99214 OFFICE O/P EST MOD 30 MIN: CPT | Performed by: NURSE PRACTITIONER

## 2023-10-05 RX ORDER — BUSPIRONE HYDROCHLORIDE 10 MG/1
10 TABLET ORAL 2 TIMES DAILY
Qty: 180 TABLET | Refills: 1 | Status: SHIPPED | OUTPATIENT
Start: 2023-10-05 | End: 2023-12-21

## 2023-10-05 RX ORDER — ALPRAZOLAM 0.5 MG
0.5 TABLET ORAL DAILY PRN
Qty: 10 TABLET | Refills: 0 | Status: SHIPPED | OUTPATIENT
Start: 2023-10-05 | End: 2023-11-30

## 2023-10-05 RX ORDER — CLONAZEPAM 1 MG/1
TABLET ORAL
Qty: 180 TABLET | Refills: 0 | COMMUNITY
Start: 2023-10-05 | End: 2023-12-21

## 2023-10-05 ASSESSMENT — ANXIETY QUESTIONNAIRES
4. TROUBLE RELAXING: MORE THAN HALF THE DAYS
2. NOT BEING ABLE TO STOP OR CONTROL WORRYING: MORE THAN HALF THE DAYS
GAD7 TOTAL SCORE: 16
5. BEING SO RESTLESS THAT IT IS HARD TO SIT STILL: MORE THAN HALF THE DAYS
7. FEELING AFRAID AS IF SOMETHING AWFUL MIGHT HAPPEN: SEVERAL DAYS
GAD7 TOTAL SCORE: 16
6. BECOMING EASILY ANNOYED OR IRRITABLE: NEARLY EVERY DAY
1. FEELING NERVOUS, ANXIOUS, OR ON EDGE: NEARLY EVERY DAY
3. WORRYING TOO MUCH ABOUT DIFFERENT THINGS: NEARLY EVERY DAY

## 2023-10-05 ASSESSMENT — PATIENT HEALTH QUESTIONNAIRE - PHQ9: SUM OF ALL RESPONSES TO PHQ QUESTIONS 1-9: 11

## 2023-10-05 NOTE — PATIENT INSTRUCTIONS
"Patient Education   The Panel Psychiatry Program  What to Expect  Here's what to expect in the Panel Psychiatry Program.   About the program  You'll be meeting with a psychiatric doctor to check your mental health. A psychiatric doctor helps you deal with troubling thoughts and feelings by giving you medicine. They'll make sure you know the plan for your care. You may see them for a long time. When you're feeling better, they may refer you back to seeing your family doctor.   If you have any questions, we'll be glad to talk to you.  About visits  Be open  At your visits, please talk openly about your problems. It may feel hard, but it's the best way for us to help you.  Cancelling visits  If you can't come to your visit, please call us right away at [phone]. If you don't cancel at least 24 hours (1 full day) before your visit, that's \"late cancellation.\"  Not showing up for your visits  Being very late is the same as not showing up. You'll be a \"no show\" if:  You're more than 15 minutes late for a 30-minute (half hour) visit.  You're more than 30 minutes late for a 60-minute (full hour) visit.  If you cancel late or don't show up 2 times within 6 months, we may end your care.  Getting help between visits  If you need help between visits, you can call us Monday to Friday from 8 a.m. to 4:30 p.m. at [phone].  Emergency care  Call 911 or go to the nearest emergency department if your life or someone else's life is in danger.  Call 988 anytime to reach the national Suicide and Crisis hotline.  Medicine refills  To refill your medicine, call your pharmacy. You can also call [facility]'s [facility] at [phone], Monday to Friday, 8 a.m. to 4:30 p.m. It can take 1 to 3 business days to get a refill.   Forms, letters, and tests  You may have papers to fill out, like FMLA, short-term disability, and workability. We can help you with these forms at your visits, but you must have an " appointment. You may need more than 1 visit for this, to be in an intensive therapy program, or both.  Before we can give you medicine for ADHD, we may refer you to get tested for it or confirm it another way.  We may not be able to give you an emotional support animal letter.  We don't do mental health checks ordered by the court.   We don't do mental health testing, but we can refer you to get tested.   Thank you for choosing us for your care.  For informational purposes only. Not to replace the advice of your health care provider. Copyright   2022 [facility] [employer]. All rights reserved. [employer] 331861 - 12/22.         Treatment Plan:      1. Add Alprazolam 0.5 mg as needed for severe panic  2.  Continue Clonazepam 1 mg in the morning and 0.5 mg at bedtime  3.  Continue lamotrigine 200 mg daily  4.  Continue buspirone 10 mg twice daily  5.  Continue quetiapine 50 mg at bedtime  6.  Continue talk therapy     Continue all other medications as reviewed per electronic medical record today.   Safety plan reviewed. To the Emergency Department as needed or call after hours crisis line at [phone] or [phone]. [place] Crisis Text Line. Text [place] to 015316 or Suicide LifeLine Chat: suicidepreventionlifeline.org/chat/  To schedule individual or family therapy, call [facility] at [phone]  Schedule an appointment with me in December or sooner as needed. Call [facility] at [phone] to schedule.  Follow up with primary care provider as planned or for acute medical concerns.  Call the psychiatric nurse line with medication questions or concerns at [phone]  MyChart may be used to communicate with your provider, but this is not intended to be used for emergencies.    Crisis Resources:    National Suicide Prevention Lifeline: 972.616.1410 (TTY: 812.958.8889). Call anytime for help.  (www.suicidepreventionlifeline.org)  National [place] on Mental Illness  (www.[place].org): 400-372-5157 or 363-242-8498.   Mental Health Association (www.mentalhealth.org): 785.500.7564 or 799-894-4530.  [place] Crisis Text Line: Text [place] to 114680  Suicide LifeLine Chat: suicidepreventionlifeline.org/chat

## 2023-10-05 NOTE — PROGRESS NOTES
Mental Health and Collaborative Care Psychiatry Service Rooming Note      Most pressing mental health concern at this time: would like to discuss her medications       Any new physical health conditions or diagnoses affecting you that we should be aware of:  denies    Side effects related to medications patient would like to discuss with the provider:  Yes - possible increased anxiety, sweating      Are you taking your medications as prescribed?  No  Buspar: 7.5 mg BID, stomach problems on higher doses  Klonopin 1 mg in am and 0.5 mg at night    If not, why? NA      Do you need refills of any of the medications?  no  If so, which ones? NA      Are you taking any recreational substances? denies      Is there any chance you are pregnant? no  Do you use birth control? yes      Provider notified  N/A      Add attendance guidelines .phrase here   Care team has reviewed attendance agreement with patient. Patient advised that two failed appointments within 6 months may lead to termination of current episode of care.         Mai Stanton LPN  October 5, 2023  12:35 PM
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
## 2023-10-11 ENCOUNTER — OFFICE VISIT (OUTPATIENT)
Dept: PHYSICAL MEDICINE AND REHAB | Facility: CLINIC | Age: 53
End: 2023-10-11

## 2023-10-11 VITALS — HEART RATE: 79 BPM | SYSTOLIC BLOOD PRESSURE: 115 MMHG | DIASTOLIC BLOOD PRESSURE: 74 MMHG

## 2023-10-11 DIAGNOSIS — G24.3 CERVICAL DYSTONIA: Primary | ICD-10-CM

## 2023-10-11 PROCEDURE — 95874 GUIDE NERV DESTR NEEDLE EMG: CPT | Performed by: PHYSICAL MEDICINE & REHABILITATION

## 2023-10-11 PROCEDURE — 99215 OFFICE O/P EST HI 40 MIN: CPT | Mod: 25 | Performed by: PHYSICAL MEDICINE & REHABILITATION

## 2023-10-11 PROCEDURE — 64616 CHEMODENERV MUSC NECK DYSTON: CPT | Mod: 50 | Performed by: PHYSICAL MEDICINE & REHABILITATION

## 2023-10-11 NOTE — PROCEDURES
?PROCEDURE: Cervical Dystonia management with chemodenervation? with onabotulinum toxin A        Dr. Matthews     DIAGNOSIS: Right torticollis and bilateral laterocollis      PROCEDURE NOTE: Procedure note A written consent was obtained from the patient explaining the risks and benefits of the procedure. After reconstituting 1:1 using 2 cc of preservative free Normal Saline with 200 Units vial and using EMG guidance I injected the following:     Right Sternocleidomastoid 40 units in 2 sites  Right Levator Scapula 20 units in one site  Right Trapeizus 80 units in four sites        Left Sternocleidomastoid 10 units in one   Left Levator Scapula 10 units in one site  Left Trapeizus 40 units in two sites        Units discarded 0 units     Lot # ([id number])   Exp date (02/2026)       The patient tolerated the procedure well.  Post procedure care plan was explained to the patient  Follow up in 6 weeks post chemodenervation? evaluation.

## 2023-10-11 NOTE — PROGRESS NOTES
.[facility]   PM&R clinic note        Interval history:     Najma Carvalho presents to clinic today for follow up reg her rehab needs.  She has h/o Right torticollis and bilateral laterocollis   Was last seen in clinic 9/13 for 6 weeks follow up and plan to continue chemodenervation to manage cervical dystonia.     Medical issues since last visit,  Had some vision changes couple of weeks ago. Reports multiple episodes with vision changes with dystonia with almost hitting a car. Seen by Dr. Alegria (ophthalmology). Recommended MRI  Had MRI brain (images and results reviewed)  Still c/o neck pain.    Social history is unchanged,     Medications:  Current Outpatient Medications   Medication Sig Dispense Refill    ALPRAZolam (XANAX) 0.5 MG tablet Take 1 tablet (0.5 mg) by mouth daily as needed for anxiety (severe panic) 10 tablet 0    busPIRone (BUSPAR) 10 MG tablet Take 1 tablet (10 mg) by mouth 2 times daily 180 tablet 1    Cholecalciferol (VITAMIN D3 PO) Take 4,000 Units by mouth daily      clonazePAM (KLONOPIN) 1 MG tablet 1 mg in the morning and 1/2 mg at bedtime 180 tablet 0    ibuprofen (ADVIL/MOTRIN) 200 MG tablet Take 200 mg by mouth every 4 hours as needed for pain      lamoTRIgine (LAMICTAL) 200 MG tablet Take 1 tablet (200 mg) by mouth daily 90 tablet 0    Magnesium Oxide 250 MG TABS Take 1 tablet by mouth daily      medroxyPROGESTERone (PROVERA) 10 MG tablet Sig: one tab every day for 10 days each month 30 tablet 3    QUEtiapine (SEROQUEL) 50 MG tablet Take 1 tablet (50 mg) by mouth At Bedtime 90 tablet 0    tiZANidine (ZANAFLEX) 4 MG tablet Take 0.5-1 tablets (2-4 mg) by mouth 3 times daily as needed for muscle spasms (90) 90 tablet 1              Physical Exam:   [facility] 09/05/2023   Gen: NAD, pleasant and cooperative   Neuro/MSK:   Neck exam showed chin rotated to the right side with lateral deviation to the right. SCM tightness at the proximal insertion was  noted.  Normal active ROM in neck directions: the following: flex, ext, sideways and rotation with limited rotation to the right side     No change in exam      Labs/Imaging:  Lab Results   Component Value Date    WBC 4.9 02/04/2023    HGB 14.6 02/04/2023    HCT 43.1 02/04/2023    MCV 90 02/04/2023     02/04/2023     Lab Results   Component Value Date     02/04/2023    POTASSIUM 3.7 02/04/2023    CHLORIDE 107 02/04/2023    CO2 23 02/04/2023     02/04/2023     Lab Results   Component Value Date    GFRESTIMATED 85 02/04/2023     Lab Results   Component Value Date    AST 17 05/07/2019    ALT 15 05/07/2019    ALKPHOS 81 05/07/2019    BILITOTAL 0.4 05/07/2019     Lab Results   Component Value Date    INR 0.96 02/04/2023     Lab Results   Component Value Date    BUN 11 02/04/2023    CR 0.83 02/04/2023              Assessment/Plan     .(G24.3) Cervical dystonia  (primary encounter diagnosis)      Interventions: see procedure note of cervical dystonia management with 200 units of onabotulinum toxin A     Referral / follow up with other providers: neurology referral under [facility] for further evaluation and management.    Follow up: 6 weeks for post procedure follow up      Kimmy Matthews MD  Physical Medicine & Rehabilitation      45 minutes spent on the date of the encounter doing chart review, history and exam, documentation and further activities as noted above in which 20 min was for the procedure.

## 2023-10-11 NOTE — LETTER
10/11/2023         RE: Najma Carvalho  [address] [place] [id number]        Dear Colleague,    Thank you for referring your patient, Najma Carvalho, to the [facility]. Please see a copy of my visit note below.    .[facility]   PM&R clinic note        Interval history:     Najma Carvalho presents to clinic today for follow up reg her rehab needs.  She has h/o Right torticollis and bilateral laterocollis   Was last seen in clinic 9/13 for 6 weeks follow up and plan to continue chemodenervation to manage cervical dystonia.     Medical issues since last visit,  Had some vision changes couple of weeks ago. Reports multiple episodes with vision changes with dystonia with almost hitting a car. Seen by Dr. Aelgria (ophthalmology). Recommended MRI  Had MRI brain (images and results reviewed)  Still c/o neck pain.    Social history is unchanged,     Medications:  Current Outpatient Medications   Medication Sig Dispense Refill     ALPRAZolam (XANAX) 0.5 MG tablet Take 1 tablet (0.5 mg) by mouth daily as needed for anxiety (severe panic) 10 tablet 0     busPIRone (BUSPAR) 10 MG tablet Take 1 tablet (10 mg) by mouth 2 times daily 180 tablet 1     Cholecalciferol (VITAMIN D3 PO) Take 4,000 Units by mouth daily       clonazePAM (KLONOPIN) 1 MG tablet 1 mg in the morning and 1/2 mg at bedtime 180 tablet 0     ibuprofen (ADVIL/MOTRIN) 200 MG tablet Take 200 mg by mouth every 4 hours as needed for pain       lamoTRIgine (LAMICTAL) 200 MG tablet Take 1 tablet (200 mg) by mouth daily 90 tablet 0     Magnesium Oxide 250 MG TABS Take 1 tablet by mouth daily       medroxyPROGESTERone (PROVERA) 10 MG tablet Sig: one tab every day for 10 days each month 30 tablet 3     QUEtiapine (SEROQUEL) 50 MG tablet Take 1 tablet (50 mg) by mouth At Bedtime 90 tablet 0     tiZANidine (ZANAFLEX) 4 MG tablet Take 0.5-1 tablets (2-4 mg) by mouth 3 times daily as needed  for muscle spasms (90) 90 tablet 1              Physical Exam:   [facility] 09/05/2023   Gen: NAD, pleasant and cooperative   Neuro/MSK:   Neck exam showed chin rotated to the right side with lateral deviation to the right. SCM tightness at the proximal insertion was noted.  Normal active ROM in neck directions: the following: flex, ext, sideways and rotation with limited rotation to the right side     No change in exam      Labs/Imaging:  Lab Results   Component Value Date    WBC 4.9 02/04/2023    HGB 14.6 02/04/2023    HCT 43.1 02/04/2023    MCV 90 02/04/2023     02/04/2023     Lab Results   Component Value Date     02/04/2023    POTASSIUM 3.7 02/04/2023    CHLORIDE 107 02/04/2023    CO2 23 02/04/2023     02/04/2023     Lab Results   Component Value Date    GFRESTIMATED 85 02/04/2023     Lab Results   Component Value Date    AST 17 05/07/2019    ALT 15 05/07/2019    ALKPHOS 81 05/07/2019    BILITOTAL 0.4 05/07/2019     Lab Results   Component Value Date    INR 0.96 02/04/2023     Lab Results   Component Value Date    BUN 11 02/04/2023    CR 0.83 02/04/2023              Assessment/Plan     .(G24.3) Cervical dystonia  (primary encounter diagnosis)      Interventions: see procedure note of cervical dystonia management with 200 units of onabotulinum toxin A     Referral / follow up with other providers: neurology referral under [facility] for further evaluation and management.    Follow up: 6 weeks for post procedure follow up      Kimmy Matthews MD  Physical Medicine & Rehabilitation      45 minutes spent on the date of the encounter doing chart review, history and exam, documentation and further activities as noted above in which 20 min was for the procedure.          Again, thank you for allowing me to participate in the care of your patient.        Sincerely,        Kimmy Matthews MD

## 2023-10-12 ENCOUNTER — TELEPHONE (OUTPATIENT)
Dept: FAMILY MEDICINE | Facility: CLINIC | Age: 53
End: 2023-10-12
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
## 2023-10-17 ENCOUNTER — THERAPY VISIT (OUTPATIENT)
Dept: PHYSICAL THERAPY | Facility: REHABILITATION | Age: 53
End: 2023-10-17
Payer: COMMERCIAL

## 2023-10-17 DIAGNOSIS — G24.3 CERVICAL DYSTONIA: Primary | ICD-10-CM

## 2023-10-17 PROCEDURE — 97110 THERAPEUTIC EXERCISES: CPT | Mod: GP | Performed by: PHYSICAL THERAPIST

## 2023-10-17 PROCEDURE — 97530 THERAPEUTIC ACTIVITIES: CPT | Mod: GP | Performed by: PHYSICAL THERAPIST

## 2023-10-17 NOTE — TELEPHONE ENCOUNTER
Dr. Eckert signed letter with date also printed on letter.  Faxed to fax number given in previous message, Do lara.

## 2023-10-17 NOTE — TELEPHONE ENCOUNTER
Suzi, from  Tongstevie Levy's office needs a narrative report from Dr. Tulio Eckert by the end of day today.     Suzi states that Dr. Eckert and Tong Levy spoke on the phone last Thursday 10/12 and Dr. Eckert was to write a narrative on Najma's treatment.     This narrative can be emailed to [email or web address] or faxed to [phone].     Please call Suzi by 4pm today at [phone] with an update or Diane by 5pm.

## 2023-10-17 NOTE — TELEPHONE ENCOUNTER
Do returned call.  She received letter via email but it needs to be signed and dated at end of report.  Please update letter with electronic signature and date.

## 2023-10-17 NOTE — TELEPHONE ENCOUNTER
Attempted to call Do x1, unable to reach her. Did not leave voicemail as noted in phone encounter.

## 2023-10-26 ENCOUNTER — THERAPY VISIT (OUTPATIENT)
Dept: PHYSICAL THERAPY | Facility: REHABILITATION | Age: 53
End: 2023-10-26
Payer: COMMERCIAL

## 2023-10-26 DIAGNOSIS — G24.3 CERVICAL DYSTONIA: Primary | ICD-10-CM

## 2023-10-26 PROCEDURE — 97140 MANUAL THERAPY 1/> REGIONS: CPT | Mod: GP | Performed by: PHYSICAL THERAPIST

## 2023-10-26 PROCEDURE — 97110 THERAPEUTIC EXERCISES: CPT | Mod: GP | Performed by: PHYSICAL THERAPIST

## 2023-11-08 ENCOUNTER — TELEPHONE (OUTPATIENT)
Dept: FAMILY MEDICINE | Facility: CLINIC | Age: 53
End: 2023-11-08

## 2023-11-08 NOTE — TELEPHONE ENCOUNTER
"  Call Requested:    Reason for call:  Do from [facility] called \"regarding the patients medical care\".  She did not provide any other information to me with regard to what she was calling about.  She said that she would like to speak with \"[facility] or his nurse, Sandra\"    Information relayed to caller:  That I would pass this along to [facility]'s care team    Caller has additional questions:  I'm assuming she'll have other questions, however she did not share any additional information with me during our call.      Could we send this information to you in [facility] or would you prefer to receive a phone call?:   Caller would prefer a phone call @ [phone]   "

## 2023-11-08 NOTE — TELEPHONE ENCOUNTER
CSS spoke with Do.  Per oD, they received an invoice greater $600 and needs Dr. Eckert's tax ID#.   Please confirm so that they can route to their accounting dept.  Thanks.

## 2023-11-10 ENCOUNTER — TELEPHONE (OUTPATIENT)
Dept: PHYSICAL THERAPY | Facility: REHABILITATION | Age: 53
End: 2023-11-10

## 2023-11-10 NOTE — TELEPHONE ENCOUNTER
Najma missed their scheduled appointment today at 7:00 am. I called them and left a voicemail informing them of this and reminding them of their next appointment on 11/20/23 and of the clinic's no show policy.

## 2023-11-13 ENCOUNTER — MYC MEDICAL ADVICE (OUTPATIENT)
Dept: OBGYN | Facility: CLINIC | Age: 53
End: 2023-11-13

## 2023-11-20 ENCOUNTER — MYC MEDICAL ADVICE (OUTPATIENT)
Dept: FAMILY MEDICINE | Facility: CLINIC | Age: 53
End: 2023-11-20

## 2023-11-20 NOTE — PROGRESS NOTES
DISCHARGE  Reason for Discharge: Change in medical status.    Discharge Plan: Patient to continue home program.    Referring Provider:  Kimmy Matthews MD       10/26/23 3180   Appointment Info   Signing clinician's name / credentials Haroldo Karimi, PT, DPT, CMTPT/DN   Visits Used 19   Medical Diagnosis Cervical dystonia   PT Tx Diagnosis Cervical radiculopathy   Progress Note/Certification   Onset of illness/injury or Date of Surgery 04/19/23   Therapy Frequency 1 x/week   Predicted Duration 90 days   GOALS   PT Goals 2;3;4   PT Goal 1   Goal Identifier Left cervical side bending ROM   Goal Description Najma will increase left cervical side bending ROM to >/= 20 degrees without pain in order to improve the quality of their ADLs.   Goal Progress 25   Target Date 07/20/23   Date Met 07/24/23   PT Goal 2   Goal Identifier Bilateral rotation ROM   Goal Description Najma will increase bilateral cervical rotation ROM to >/= 70 degrees without pain in order to improve the quality of their ADLs.   Goal Progress 65 right, 70 left   Target Date 11/17/23   PT Goal 3   Goal Identifier NDI   Goal Description Najma will demonstrate a decrease in pain and increase in function as measured by scoring </= 15/50 on the NDI.   Goal Progress 22/50   Target Date 11/17/23   PT Goal 4   Goal Identifier Sleep   Goal Description Najma will demonstrate imrovement in her overall function and pain levels by beeing able to sleep through the night without waking due to pain at lease 4 nights per week in order to imrpove her QOL.   Goal Progress 2-3 hours less sleep   Target Date 11/17/23   Subjective Report   Subjective Report She has been having more tightness this past week. She thinks it is partially due to increased stress, but otherwise she is feeling okay.   Treatment Interventions (PT)   Interventions Manual Therapy;Therapeutic Procedure/Exercise;Therapeutic Activity   Therapeutic Procedure/Exercise   Therapeutic Procedures:  "strength, endurance, ROM, flexibillity minutes (54716) 15   Therapeutic Procedures Ther Proc 2;Ther Proc 3;Ther Proc 4;Ther Proc 5;Ther Proc 6;Ther Proc 7;Ther Proc 8;Ther Proc 9;Ther Proc 10   Ther Proc 1 UBE: 1.5' forwards and backwards   Ther Proc 1 - Details Face pulls: BTB x10   Ther Proc 2 Rows:   Ther Proc 2 - Details PNF D2:   Ther Proc 3 GH ext: BTB x10   Ther Proc 3 - Details UT stretch:   Ther Proc 4 Supine chin tucks with nods:   Ther Proc 4 - Details Serratus anterior hugs: BTB x15   Ther Proc 5 Scap squeezes: x10   Ther Proc 5 - Details Pec door stretch: x45\"   Ther Proc 6 Reverse flys: BTB x10   Ther Proc 6 - Details Cross-body rhomboid stretch: x30\" B   Ther Proc 7 Prone I's, T's, and Y's:   Skilled Intervention Exercises to improve neck and upper back strength and stability   Patient Response/Progress Mild discomfort throughout but no pain   Therapeutic Activity   Therapeutic Activities: dynamic activities to improve functional performance minutes (06183) 5   Therapeutic Activities Ther Act 2;Ther Act 3   Ther Act 1 Wall edgar: x15   Ther Act 1 - Details Shoulder flexion with isometric ER: BTB x10   Ther Act 2 Unilateral flexion with isometric ER: OTB x10 B   Ther Act 2 - Details Unilateral extension with isometric ER: OTB x10 B   Ther Act 3 Cactus pushups: x10   Neuromuscular Re-education   Neuro Re-ed 1 Cervical proprioception training   Skilled Intervention Exercises to imrpove cervical proprioception   Manual Therapy   Manual Therapy: Mobilization, MFR, MLD, friction massage minutes (97381) 24   Manual Therapy Manual Therapy 2;Manual Therapy 3;Manual Therapy 4   Manual Therapy 1 TPR   Manual Therapy 1 - Details Soft tissue mobilization and trigger point release to bilateral middle scalene and left SCM and UT to decrease pain and reduce taut bands.   Manual Therapy 2 Cervical mobs   Manual Therapy 3 Manual traction   Manual Therapy 3 - Details Manual cervical traction, with and without " stretching, to decrease pain and improve cervical mobility.   Treatment Detail TPR to reduce pain   Progress Increased pain with cervical mobs today   Plan   Home program PTRx   Plan for next session Try TRX exercises.   Comments   Comments Najma had increased tightness in her left cervical paraspinals and middle scalenes today. This was improved with manual therapy and stretching. She did well with the strengthening exercises today, noting no increase in her pain or stiffness with any of them. She will likely benefit from continued strengthening of her postural muscles to prevent excess stiffness and reduce her pain.   Total Session Time   Timed Code Treatment Minutes 44   Total Treatment Time (sum of timed and untimed services) 44   Medicare Claim Information   Medical Diagnosis Cervical dystonia   PT Diagnosis Cervical dystonia   Start of Care Date 04/21/23   Onset date of current episode/exacerbation 01/18/21   Ortho Goal 1   Goal Identifier Left cervical side bending ROM   Goal Description Najma will increase left cervical side bending ROM to >/= 20 degrees without pain in order to improve the quality of their ADLs.   Goal Progress 15   Target Date 07/20/23   Ortho Goal 2   Goal Identifier Bilateral rotation ROM   Goal Description Najma will increase bilateral cervical rotation ROM to >/= 70 degrees without pain in order to improve the quality of their ADLs.   Goal Progress 65 right, 60 left   Target Date 07/20/23   Ortho Goal 3   Goal Identifier NDI   Goal Description Najma will demonstrate a decrease in pain and increase in function as measured by scoring </= 15/50 on the NDI.   Goal Progress 22/50   Target Date 07/20/23   Ortho Goal 4   Goal Identifier Sleep   Goal Description Najma will demonstrate imrovement in her overall function and pain levels by beeing able to sleep through the night without waking due to pain at lease 4 nights per week in order to imrpove her QOL.   Goal Progress 2-3 hours less  sleep   Target Date 07/20/23

## 2023-12-19 ENCOUNTER — MYC MEDICAL ADVICE (OUTPATIENT)
Dept: PSYCHIATRY | Facility: CLINIC | Age: 53
End: 2023-12-19

## 2023-12-19 DIAGNOSIS — F41.1 GAD (GENERALIZED ANXIETY DISORDER): ICD-10-CM

## 2023-12-20 NOTE — TELEPHONE ENCOUNTER
"RN reviewed patient's [email or web address] message. Pt reports that she stopped taking Clonazepam 1 week ago and has developed some symptoms of withdrawal.    RN phoned the patient. She reports that she has been taking 1mg every morning and 0.5mg every evening since 2019 apart from reducing her dose to 0.5mg at bedtime 3 weeks ago and stopping 1 week ago.     Patient reports that she is currently experiencing \"nervous shakiness\" with mild hand tremors, \"real bad insomnia,\" sweaty hands (states she typically does), headaches, irritability, increased anxiety, and mild nausea.      States that her average blood pressure over the past 3 days has been 146/81. Her pulse has been below 80.     RN recommended that she present to the nearest ED to be evaluated.     She verbalized understanding and stated that her daughter is able to drive her.   "

## 2023-12-21 RX ORDER — CLONAZEPAM 0.5 MG/1
TABLET ORAL
Qty: 11 TABLET | Refills: 0 | Status: SHIPPED | OUTPATIENT
Start: 2023-12-21 | End: 2024-01-15

## 2023-12-21 RX ORDER — BUSPIRONE HYDROCHLORIDE 10 MG/1
10 TABLET ORAL 2 TIMES DAILY
Qty: 180 TABLET | Refills: 0 | Status: SHIPPED | OUTPATIENT
Start: 2023-12-21 | End: 2024-01-15

## 2023-12-21 NOTE — TELEPHONE ENCOUNTER
RN reviewed notes from Dr. Vines and Jennie Briones regarding this patients recent anxiety and treatment recommendations.       RN attempted to phone the patient to discuss Dr. Vines's treatment recommendations.  There was no answer.  RN LVM on [phone] (Mobile) instruction the patient to please review [employer] for Dr. Vines's recommendation or to please call the clinic at [phone].  RN is awaiting reply.    2.  RN sent the patient a [employer] message.  Please see this [facility] encounter.      Russ Mcclain RN on 12/21/2023 at 8:42 AM

## 2023-12-21 NOTE — TELEPHONE ENCOUNTER
Reviewed, it seems patient did go to ED or urgent care. Please advise patient we can do a small dosage of daily clonazepam for a longer period of time to help with withdrawals. Given the number of sx happening it may be better to do a slower taper so as to not affect blood pressure etc. She could do 0.25 mg of clonazepam a day for two weeks, then every other day for two weeks, then discontinue. We can do slower tapers than that too. Thank you.

## 2023-12-21 NOTE — TELEPHONE ENCOUNTER
I sent a prescription to her pharmacy for the clonazepam taper as recommended by Dr. Vines.  I also sent a refill to her pharmacy for the buspirone.  I do not see hydroxyzine on her schedule so I am not sure how much she is taking.  Thanks.  Jennie

## 2023-12-22 RX ORDER — HYDROXYZINE PAMOATE 25 MG/1
25-50 CAPSULE ORAL 4 TIMES DAILY PRN
Qty: 120 CAPSULE | Refills: 0 | Status: SHIPPED | OUTPATIENT
Start: 2023-12-22 | End: 2024-04-18

## 2023-12-22 NOTE — TELEPHONE ENCOUNTER
Reviewed patient's [employer] message. She reports that she has been taking Hydroxyzine 25mg 1 to 2 capsules up to four times daily as needed for anxiety.           JULISSA DURAN RN on 12/22/2023 at 1:32 PM

## 2024-01-09 ENCOUNTER — MYC REFILL (OUTPATIENT)
Dept: PSYCHIATRY | Facility: CLINIC | Age: 54
End: 2024-01-09

## 2024-01-09 DIAGNOSIS — F31.62 BIPOLAR DISORDER, CURRENT EPISODE MIXED, MODERATE (H): ICD-10-CM

## 2024-01-09 DIAGNOSIS — F41.1 GAD (GENERALIZED ANXIETY DISORDER): ICD-10-CM

## 2024-01-09 RX ORDER — QUETIAPINE FUMARATE 50 MG/1
50 TABLET, FILM COATED ORAL AT BEDTIME
Qty: 90 TABLET | Refills: 0 | Status: SHIPPED | OUTPATIENT
Start: 2024-01-09 | End: 2024-01-15

## 2024-01-09 NOTE — TELEPHONE ENCOUNTER
Received a fax from [employer] in [place] for a 90 day refill of QUEtiapine (SEROQUEL) 50 MG tablet     Date of Last Office Visit: 10/5/23  Date of Next Office Visit: 1/15/24  No shows since last visit: 0  Cancellations since last visit: 1    Medication requested: QUEtiapine (SEROQUEL) 50 MG tablet  Date last ordered: 10/4/23 Qty: 90 Refills: 0     Lapse in medication adherence greater than 5 days?: no  If yes, call patient and gather details: na  Medication refill request verified as identical to current order?: yes  Result of Last DAM, VPA, Li+ Level, CBC, or Carbamazepine Level (at or since last visit): N/A    Last visit treatment plan:        Treatment Plan:        1. Add Alprazolam 0.5 mg as needed for severe panic  2.  Continue Clonazepam 1 mg in the morning and 0.5 mg at bedtime  3.  Continue lamotrigine 200 mg daily  4.  Continue buspirone 10 mg twice daily  5.  Continue quetiapine 50 mg at bedtime  6.  Continue talk therapy      Continue all other medications as reviewed per electronic medical record today.   Safety plan reviewed. To the Emergency Department as needed or call after hours crisis line at [phone] or [phone]. [place] Crisis Text Line. Text [place] to 204120 or Suicide LifeLine Chat: suicidepreventionlifeline.org/chat/  To schedule individual or family therapy, call [facility] at [phone]  Schedule an appointment with me in December or sooner as needed. Call [facility] at [phone] to schedule.    []Medication refilled per  Medication Refill in Ambulatory Care  policy.  [x]Medication unable to be refilled by RN due to criteria not met as indicated below:    []Eligibility - not seen in the last year   []Supervision - no future appointment   []Compliance - no shows, cancellations or lapse in therapy   []Verification - order discrepancy   []Controlled medication   []Medication not included in policy   [x]90-day supply request   [x]Other  contraindication due to allergy

## 2024-01-15 ENCOUNTER — OFFICE VISIT (OUTPATIENT)
Dept: PSYCHIATRY | Facility: CLINIC | Age: 54
End: 2024-01-15

## 2024-01-15 ENCOUNTER — OFFICE VISIT (OUTPATIENT)
Dept: NEUROLOGY | Facility: CLINIC | Age: 54
End: 2024-01-15

## 2024-01-15 VITALS
SYSTOLIC BLOOD PRESSURE: 148 MMHG | WEIGHT: 197 LBS | BODY MASS INDEX: 30.92 KG/M2 | HEIGHT: 67 IN | DIASTOLIC BLOOD PRESSURE: 76 MMHG | HEART RATE: 69 BPM

## 2024-01-15 VITALS — DIASTOLIC BLOOD PRESSURE: 89 MMHG | SYSTOLIC BLOOD PRESSURE: 144 MMHG | HEART RATE: 79 BPM

## 2024-01-15 DIAGNOSIS — G24.3 CERVICAL DYSTONIA: ICD-10-CM

## 2024-01-15 DIAGNOSIS — F41.1 GAD (GENERALIZED ANXIETY DISORDER): ICD-10-CM

## 2024-01-15 DIAGNOSIS — F31.62 BIPOLAR DISORDER, CURRENT EPISODE MIXED, MODERATE (H): Primary | ICD-10-CM

## 2024-01-15 PROCEDURE — 99214 OFFICE O/P EST MOD 30 MIN: CPT | Performed by: NURSE PRACTITIONER

## 2024-01-15 PROCEDURE — 99205 OFFICE O/P NEW HI 60 MIN: CPT | Performed by: PSYCHIATRY & NEUROLOGY

## 2024-01-15 RX ORDER — QUETIAPINE FUMARATE 50 MG/1
50 TABLET, FILM COATED ORAL AT BEDTIME
Qty: 90 TABLET | Refills: 1 | Status: SHIPPED | OUTPATIENT
Start: 2024-01-15 | End: 2024-04-18

## 2024-01-15 RX ORDER — BUSPIRONE HYDROCHLORIDE 10 MG/1
10 TABLET ORAL 2 TIMES DAILY
Qty: 180 TABLET | Refills: 1 | Status: SHIPPED | OUTPATIENT
Start: 2024-01-15 | End: 2024-07-18

## 2024-01-15 RX ORDER — BUSPIRONE HYDROCHLORIDE 10 MG/1
10 TABLET ORAL 2 TIMES DAILY
Qty: 180 TABLET | Refills: 0 | Status: CANCELLED | OUTPATIENT
Start: 2024-01-15

## 2024-01-15 RX ORDER — CLONAZEPAM 0.5 MG/1
TABLET ORAL
Qty: 45 TABLET | Refills: 0 | Status: SHIPPED | OUTPATIENT
Start: 2024-01-15 | End: 2024-04-18

## 2024-01-15 RX ORDER — PHENOL 1.4 %
10 AEROSOL, SPRAY (ML) MUCOUS MEMBRANE
COMMUNITY

## 2024-01-15 RX ORDER — LAMOTRIGINE 200 MG/1
200 TABLET ORAL DAILY
Qty: 90 TABLET | Refills: 1 | Status: SHIPPED | OUTPATIENT
Start: 2024-01-15 | End: 2024-07-18

## 2024-01-15 ASSESSMENT — ANXIETY QUESTIONNAIRES
1. FEELING NERVOUS, ANXIOUS, OR ON EDGE: NEARLY EVERY DAY
GAD7 TOTAL SCORE: 21
3. WORRYING TOO MUCH ABOUT DIFFERENT THINGS: NEARLY EVERY DAY
GAD7 TOTAL SCORE: 21
2. NOT BEING ABLE TO STOP OR CONTROL WORRYING: NEARLY EVERY DAY
5. BEING SO RESTLESS THAT IT IS HARD TO SIT STILL: NEARLY EVERY DAY
6. BECOMING EASILY ANNOYED OR IRRITABLE: NEARLY EVERY DAY
7. FEELING AFRAID AS IF SOMETHING AWFUL MIGHT HAPPEN: NEARLY EVERY DAY
4. TROUBLE RELAXING: NEARLY EVERY DAY

## 2024-01-15 ASSESSMENT — PAIN SCALES - GENERAL: PAINLEVEL: MILD PAIN (2)

## 2024-01-15 ASSESSMENT — PATIENT HEALTH QUESTIONNAIRE - PHQ9: SUM OF ALL RESPONSES TO PHQ QUESTIONS 1-9: 12
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
## 2024-01-15 NOTE — PATIENT INSTRUCTIONS
Some of your inner-restlessness could be from medications. If you are in a stable psychiatric state, you may consider altering your serotonergic medication (buspirone) as well as/or your quetiapine (antipsychotic).    Otherwise, you head turning event with vision loss is concerning for a vascular stenosis of the head/neck and a CTA head and neck could be done to evaluate.    Your meningioma is likely incidental.  It is small ([id number]), and not leading to regions of brain swelling or localized injury over the left frontal-temporal region.  I do not feel it would cause any of your discussed symptoms.  It should be monitored with repeat imaging in 6 months.

## 2024-01-15 NOTE — PROGRESS NOTES
Mental Health and Collaborative Care Psychiatry Service Rooming Note      Most pressing mental health concern at this time: Pt is not sure if Ceferino is working .   recently diagnoses with CHF      Any new physical health conditions or diagnoses affecting you that we should be aware of: Seeing a neurologist this afternoon to Follow-up on MRI report. See report from 9/15/23      Side effects related to medications patient would like to discuss with the provider:  Possible dizziness      Are you taking your medications as prescribed?  yes  If not, why? NA      Do you need refills of any of the medications?  yes  If so, which ones? Buspar and Klonopin       Are you taking any recreational substances? denies      Is there any chance you are pregnant? No  Do you use birth control? perimenopausal      Provider notified  N/A      Add attendance guidelines .phrase here   Care team has reviewed attendance agreement with patient. Patient advised that two failed appointments within 6 months may lead to termination of current episode of care.         Mai Stanton LPN  January 15, 2024  10:51 AM
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
## 2024-01-15 NOTE — NURSING NOTE
Chief Complaint   Patient presents with    Consult     Cervical dystonia,  Referred by Kimmy Matthews MD Kena Gemeda, MA on 1/15/2024 at 3:20 PM

## 2024-01-15 NOTE — PATIENT INSTRUCTIONS
"Patient Education   The Panel Psychiatry Program  What to Expect  Here's what to expect in the Panel Psychiatry Program.   About the program  You'll be meeting with a psychiatric doctor to check your mental health. A psychiatric doctor helps you deal with troubling thoughts and feelings by giving you medicine. They'll make sure you know the plan for your care. You may see them for a long time. When you're feeling better, they may refer you back to seeing your family doctor.   If you have any questions, we'll be glad to talk to you.  About visits  Be open  At your visits, please talk openly about your problems. It may feel hard, but it's the best way for us to help you.  Cancelling visits  If you can't come to your visit, please call us right away at [phone]. If you don't cancel at least 24 hours (1 full day) before your visit, that's \"late cancellation.\"  Not showing up for your visits  Being very late is the same as not showing up. You'll be a \"no show\" if:  You're more than 15 minutes late for a 30-minute (half hour) visit.  You're more than 30 minutes late for a 60-minute (full hour) visit.  If you cancel late or don't show up 2 times within 6 months, we may end your care.  Getting help between visits  If you need help between visits, you can call us Monday to Friday from 8 a.m. to 4:30 p.m. at [phone].  Emergency care  Call 911 or go to the nearest emergency department if your life or someone else's life is in danger.  Call 988 anytime to reach the national Suicide and Crisis hotline.  Medicine refills  To refill your medicine, call your pharmacy. You can also call [facility] at [phone], Monday to Friday, 8 a.m. to 4:30 p.m. It can take 1 to 3 business days to get a refill.   Forms, letters, and tests  You may have papers to fill out, like FMLA, short-term disability, and workability. We can help you with these forms at your visits, but you must have an " appointment. You may need more than 1 visit for this, to be in an intensive therapy program, or both.  Before we can give you medicine for ADHD, we may refer you to get tested for it or confirm it another way.  We may not be able to give you an emotional support animal letter.  We don't do mental health checks ordered by the court.   We don't do mental health testing, but we can refer you to get tested.   Thank you for choosing us for your care.  For informational purposes only. Not to replace the advice of your health care provider. Copyright   2022 [employer]. All rights reserved. [facility] Room 941502 - 12/22.       Treatment Plan:      1.  Clonazepam 1/2 tab daily  2.  Quetiapine 50 mg at bedtime  3.  Lamotrigine 200 mg daily  4.  Hydroxyzine 25 mg up to 4 doses daily  5.  Melatonin 10 mg at  bedtime  6.  Buspirone 10 mg twice daily  7.  Continue therapy with M[facility]    Continue all other medical directions per primary care provider.   Continue all other medications as reviewed per electronic medical record today.   Safety plan reviewed. To the Emergency Department as needed or call after hours crisis line at [phone] or [phone]. [place] Crisis Text Line: Text [place] to 930683  or  Suicide LifeLine Chat: suicidepreventionlifeline.org/chat/  To schedule individual or family therapy, call [facility] at [phone].   Schedule an appointment with me in 3 months or sooner as needed.  Call [facility] at [phone] to schedule.  Follow up with primary care provider as planned or for acute medical concerns.  Call the psychiatric nurse line with medication questions or concerns at [phone].  [employer] may be used to communicate with your provider, but this is not intended to be used for emergencies.        Crisis Resources   The [facility]Path is an adults only unit located at [facility] in [place] is a short term (generally less than 23 hour stay) designed for crisis  intervention and stabilization. Pts have the opportunity to meet quickly with a behavioral health team for evaluation in a calm and peaceful therapuetic environment. To be evaluated for admission pts are triaged throught the [facility] ED.      The following hotlines are for both adults and children. The and are open 24 hours a day, 7 days a week unless noted otherwise.        Crisis Lines      Crisis Text Line  Text 507195  You will be connected with a trained live crisis counselor to provide support.        Gambling Hotline  1.750.476.6041 [hope]        línea de crisis española  739.734.0228        [place] Bioptigen Helpline  [phone]        National Hope Line  4.180.372.3378 [hope]        National Suicide Prevention Lifeline  Free and confidential support  988 or 1.036.150.TALK [8255]  http://suicidepreventionlifeline.org        The Sean Project (LGBTQ Youth Crisis Line)  3.754.821.7138  text START to 275-733        [place]'s Crisis Line  3.155.031.9537 (Press 1)  or text 737457    [facility] Mental Health Crisis Response  Within [place], call **CRISIS [**778267] to be connected to a mental health professional who can assist you.        [facility] Crisis  [phone]      [facility] Mobile Crisis  [phone]      [facility] Crisis  [phone]      [facility] Mobile Crisis  [phone] (adults)  [phone] (children)      [facility] Mobile Crisis  [phone] (adults)  [phone] (children)      [facility] Mobile Crisis  [phone]      [facility] Mobile Crisis  [phone]    Community Resources      Fast Tracker  Linking people to mental health and substance use disorder resources  fasttrackermn.org        [place] Mental Health Warmline  Peer to peer support  5 pm to 9 am 7 days/week  0.494.693.6448  https://mnwitw.org/ellie        National [place] on Mental Illness (ADRI)  507.812.9445 or 1.888.ADRI.HELPS   https://[facility] Hospitalimn.org/        [facility]  [facility] residents only   [address] [facility]  [phone]        [facility]  Free mental health counseling  https://walkin.org/  612.870.0565 X2    Mental Health Apps      Calm Harm  https://calmharm.co.uk/      My3  https://my3app.org/      [place] Safety Plan  https://www.mysafetyplan.org/

## 2024-02-05 ENCOUNTER — OFFICE VISIT (OUTPATIENT)
Dept: FAMILY MEDICINE | Facility: CLINIC | Age: 54
End: 2024-02-05

## 2024-02-05 VITALS
RESPIRATION RATE: 16 BRPM | BODY MASS INDEX: 31.45 KG/M2 | HEART RATE: 72 BPM | SYSTOLIC BLOOD PRESSURE: 143 MMHG | WEIGHT: 200.4 LBS | TEMPERATURE: 97.8 F | OXYGEN SATURATION: 97 % | HEIGHT: 67 IN | DIASTOLIC BLOOD PRESSURE: 85 MMHG

## 2024-02-05 DIAGNOSIS — Z63.0 MARITAL CONFLICT: ICD-10-CM

## 2024-02-05 DIAGNOSIS — N91.4 SECONDARY OLIGOMENORRHEA: ICD-10-CM

## 2024-02-05 DIAGNOSIS — Z12.4 CERVICAL CANCER SCREENING: ICD-10-CM

## 2024-02-05 DIAGNOSIS — Z11.4 SCREENING FOR HIV (HUMAN IMMUNODEFICIENCY VIRUS): ICD-10-CM

## 2024-02-05 DIAGNOSIS — F31.62 BIPOLAR DISORDER, CURRENT EPISODE MIXED, MODERATE (H): ICD-10-CM

## 2024-02-05 DIAGNOSIS — I10 BENIGN ESSENTIAL HYPERTENSION: ICD-10-CM

## 2024-02-05 DIAGNOSIS — Z12.31 VISIT FOR SCREENING MAMMOGRAM: Primary | ICD-10-CM

## 2024-02-05 DIAGNOSIS — Z11.59 NEED FOR HEPATITIS C SCREENING TEST: ICD-10-CM

## 2024-02-05 DIAGNOSIS — F41.1 GAD (GENERALIZED ANXIETY DISORDER): ICD-10-CM

## 2024-02-05 LAB
FSH SERPL IRP2-ACNC: 79.6 MIU/ML
HCG UR QL: NEGATIVE
TSH SERPL DL<=0.005 MIU/L-ACNC: 1.65 UIU/ML (ref 0.3–4.2)

## 2024-02-05 PROCEDURE — 99214 OFFICE O/P EST MOD 30 MIN: CPT | Performed by: FAMILY MEDICINE

## 2024-02-05 PROCEDURE — 36415 COLL VENOUS BLD VENIPUNCTURE: CPT | Performed by: FAMILY MEDICINE

## 2024-02-05 PROCEDURE — 83001 ASSAY OF GONADOTROPIN (FSH): CPT | Performed by: FAMILY MEDICINE

## 2024-02-05 PROCEDURE — 81025 URINE PREGNANCY TEST: CPT | Performed by: FAMILY MEDICINE

## 2024-02-05 PROCEDURE — 84443 ASSAY THYROID STIM HORMONE: CPT | Performed by: FAMILY MEDICINE

## 2024-02-05 RX ORDER — PROPRANOLOL HYDROCHLORIDE 10 MG/1
10 TABLET ORAL 2 TIMES DAILY
Qty: 60 TABLET | Refills: 1 | Status: SHIPPED | OUTPATIENT
Start: 2024-02-05 | End: 2024-03-04

## 2024-02-05 SDOH — SOCIAL STABILITY - SOCIAL INSECURITY: PROBLEMS IN RELATIONSHIP WITH SPOUSE OR PARTNER: Z63.0

## 2024-02-05 NOTE — PROGRESS NOTES
"  Assessment & Plan   Problem List Items Addressed This Visit       Bipolar disorder (H)    SANTIAGO (generalized anxiety disorder)    Relevant Medications    propranolol (INDERAL) 10 MG tablet     Other Visit Diagnoses       Visit for screening mammogram    -  Primary    Screening for HIV (human immunodeficiency virus)        Relevant Orders    HIV Antigen Antibody Combo    Need for hepatitis C screening test        Relevant Orders    Hepatitis C Screen Reflex to HCV RNA Quant and Genotype    Cervical cancer screening        Secondary oligomenorrhea        Relevant Orders    Follicle stimulating hormone    HCG qualitative urine    Marital conflict        Benign essential hypertension        Relevant Medications    propranolol (INDERAL) 10 MG tablet           HAS HAD 9 MENSTRUAL CYCLES IN THE PAST 12 MONTHS, REGULAR Q MONTH jAN THROUGH sEPT THEN NONE SINCE sEPTEMBER.        Bipolar disorder, generalized anxiety disorder patient continues to work with her psychiatrist.  Increased stress recently as patient's  behaved in such a way that he now has a restraining order against him.  Patient does have support from her family.    Hypertension as well as generalized anxiety disorder we will see how patient feels with some propranolol.  Will also have her monitor her blood pressures and let me know how these are doing.  She will do this through [employer] as she is currently uninsured.    She is due for screening for breast cancer, hepatitis C, and HIV.  She is currently uninsured so we will put these on hold for now.  She is working with the care coordinators to apply for care everywhere and [facility].                             BMI  Estimated body mass index is 31.39 kg/m  as calculated from the following:    Height as of this encounter: 1.702 m (5' 7\").    Weight as of this encounter: 90.9 kg (200 lb 6.4 oz).             Praful Yao is a 53 year old, presenting for the following health issues:  Recheck " "Medication (Medication check )        2/5/2024     1:51 PM   Additional Questions   Roomed by DANIEL Bhatti     History of Present Illness       Reason for visit:  Medroxyprogeterone missed periods  Symptom onset:  More than a month  Symptom intensity:  Mild  Symptom progression:  Staying the same  Had these symptoms before:  No    She eats 2-3 servings of fruits and vegetables daily.She consumes 2 sweetened beverage(s) daily.She exercises with enough effort to increase her heart rate 20 to 29 minutes per day.  She exercises with enough effort to increase her heart rate 4 days per week. She is missing 2 dose(s) of medications per week.                     Objective    BP (!) 143/85 (BP Location: Right arm, Patient Position: Sitting, Cuff Size: Adult Large)   Pulse 72   Temp 97.8  F (36.6  C) (Oral)   Resp 16   Ht 1.702 m (5' 7\")   Wt 90.9 kg (200 lb 6.4 oz)   SpO2 97%   BMI 31.39 kg/m    Body mass index is 31.39 kg/m .  Physical Exam               Signed Electronically by: Cris Gomez MD    "

## 2024-02-05 NOTE — PATIENT INSTRUCTIONS
Please send me a list of your home blood pressure readings after you have been on the propranolol for at least 1 week.    The [place] [employer] menopause Society's website has a lot of information about menopause and being perimenopausal.

## 2024-02-27 ENCOUNTER — OFFICE VISIT (OUTPATIENT)
Dept: PHYSICAL MEDICINE AND REHAB | Facility: CLINIC | Age: 54
End: 2024-02-27

## 2024-02-27 DIAGNOSIS — G24.3 CERVICAL DYSTONIA: Primary | ICD-10-CM

## 2024-02-27 PROCEDURE — 99214 OFFICE O/P EST MOD 30 MIN: CPT | Performed by: PHYSICAL MEDICINE & REHABILITATION

## 2024-02-27 NOTE — LETTER
2/27/2024         RE: Najma Carvalho  [address] [place] [id number]        Dear Colleague,    Thank you for referring your patient, Najma Carvalho, to the [facility]. Please see a copy of my visit note below.    .[facility]   PM&R clinic note        Interval history:     Najma Carvalho presents to clinic today for follow up reg her rehab needs.   She has h/o Right torticollis and bilateral laterocollis   Was last seen in clinic 10/11/23  Recommendations included continue follow-up cervical dystonia.    Medical issues since last visit,    No health issues reported. Started Propranolol for HTN with no concerns reported.    Finds tremor that was taking care by Propranolol    Social history is unchanged,     Medications:  Current Outpatient Medications   Medication Sig Dispense Refill     busPIRone (BUSPAR) 10 MG tablet Take 1 tablet (10 mg) by mouth 2 times daily 180 tablet 1     clonazePAM (KLONOPIN) 0.5 MG tablet Take 1/2 tablet (0.25 mg) daily 45 tablet 0     hydrOXYzine jean (VISTARIL) 25 MG capsule Take 1-2 capsules (25-50 mg) by mouth 4 times daily as needed for anxiety 120 capsule 0     ibuprofen (ADVIL/MOTRIN) 200 MG tablet Take 200 mg by mouth every 4 hours as needed for pain       lamoTRIgine (LAMICTAL) 200 MG tablet Take 1 tablet (200 mg) by mouth daily 90 tablet 1     Melatonin 10 MG TABS tablet Take 10 mg by mouth nightly as needed for sleep       propranolol (INDERAL) 10 MG tablet Take 1 tablet (10 mg) by mouth 2 times daily 60 tablet 1     QUEtiapine (SEROQUEL) 50 MG tablet Take 1 tablet (50 mg) by mouth at bedtime 90 tablet 1              Physical Exam:   There were no vitals taken for this visit.  Gen: NAD, pleasant and cooperative   Neuro/MSK:   Normal complete neuro exam  No UMN signs identified    Labs/Imaging:  Lab Results   Component Value Date    WBC 4.9 02/04/2023    HGB 14.6 02/04/2023    HCT 43.1  02/04/2023    MCV 90 02/04/2023     02/04/2023     Lab Results   Component Value Date     02/04/2023    POTASSIUM 3.7 02/04/2023    CHLORIDE 107 02/04/2023    CO2 23 02/04/2023     02/04/2023     Lab Results   Component Value Date    GFRESTIMATED 85 02/04/2023     Lab Results   Component Value Date    AST 17 05/07/2019    ALT 15 05/07/2019    ALKPHOS 81 05/07/2019    BILITOTAL 0.4 05/07/2019     Lab Results   Component Value Date    INR 0.96 02/04/2023     Lab Results   Component Value Date    BUN 11 02/04/2023    CR 0.83 02/04/2023              Assessment/Plan     .(G24.3) Cervical dystonia  (primary encounter diagnosis)    Follow up: BOTOX for cervical dystonia. To schedule when first available.      Kimmy Matthews MD  Physical Medicine & Rehabilitation        30 minutes spent on the date of the encounter doing chart review, history and exam, documentation and further activities as noted above          Again, thank you for allowing me to participate in the care of your patient.        Sincerely,        Kimmy Matthews MD

## 2024-02-27 NOTE — PROGRESS NOTES
.[facility]   PM&R clinic note        Interval history:     Najma Carvalho presents to clinic today for follow up reg her rehab needs.   She has h/o Right torticollis and bilateral laterocollis   Was last seen in clinic 10/11/23  Recommendations included continue follow-up cervical dystonia.    Medical issues since last visit,    No health issues reported. Started Propranolol for HTN with no concerns reported.    Finds tremor that was taking care by Propranolol    Social history is unchanged,     Medications:  Current Outpatient Medications   Medication Sig Dispense Refill    busPIRone (BUSPAR) 10 MG tablet Take 1 tablet (10 mg) by mouth 2 times daily 180 tablet 1    clonazePAM (KLONOPIN) 0.5 MG tablet Take 1/2 tablet (0.25 mg) daily 45 tablet 0    hydrOXYzine jean (VISTARIL) 25 MG capsule Take 1-2 capsules (25-50 mg) by mouth 4 times daily as needed for anxiety 120 capsule 0    ibuprofen (ADVIL/MOTRIN) 200 MG tablet Take 200 mg by mouth every 4 hours as needed for pain      lamoTRIgine (LAMICTAL) 200 MG tablet Take 1 tablet (200 mg) by mouth daily 90 tablet 1    Melatonin 10 MG TABS tablet Take 10 mg by mouth nightly as needed for sleep      propranolol (INDERAL) 10 MG tablet Take 1 tablet (10 mg) by mouth 2 times daily 60 tablet 1    QUEtiapine (SEROQUEL) 50 MG tablet Take 1 tablet (50 mg) by mouth at bedtime 90 tablet 1              Physical Exam:   There were no vitals taken for this visit.  Gen: NAD, pleasant and cooperative   Neuro/MSK:   Normal complete neuro exam  No UMN signs identified    Labs/Imaging:  Lab Results   Component Value Date    WBC 4.9 02/04/2023    HGB 14.6 02/04/2023    HCT 43.1 02/04/2023    MCV 90 02/04/2023     02/04/2023     Lab Results   Component Value Date     02/04/2023    POTASSIUM 3.7 02/04/2023    CHLORIDE 107 02/04/2023    CO2 23 02/04/2023     02/04/2023     Lab Results   Component Value Date    GFRESTIMATED 85  02/04/2023     Lab Results   Component Value Date    AST 17 05/07/2019    ALT 15 05/07/2019    ALKPHOS 81 05/07/2019    BILITOTAL 0.4 05/07/2019     Lab Results   Component Value Date    INR 0.96 02/04/2023     Lab Results   Component Value Date    BUN 11 02/04/2023    CR 0.83 02/04/2023              Assessment/Plan     .(G24.3) Cervical dystonia  (primary encounter diagnosis)    Follow up: BOTOX for cervical dystonia. To schedule when first available.      Kimmy Matthews MD  Physical Medicine & Rehabilitation        30 minutes spent on the date of the encounter doing chart review, history and exam, documentation and further activities as noted above

## 2024-03-03 DIAGNOSIS — I10 BENIGN ESSENTIAL HYPERTENSION: ICD-10-CM

## 2024-03-03 DIAGNOSIS — F41.1 GAD (GENERALIZED ANXIETY DISORDER): ICD-10-CM

## 2024-03-04 ENCOUNTER — MYC MEDICAL ADVICE (OUTPATIENT)
Dept: FAMILY MEDICINE | Facility: CLINIC | Age: 54
End: 2024-03-04

## 2024-03-04 DIAGNOSIS — I10 BENIGN ESSENTIAL HYPERTENSION: ICD-10-CM

## 2024-03-04 DIAGNOSIS — F41.1 GAD (GENERALIZED ANXIETY DISORDER): ICD-10-CM

## 2024-03-04 RX ORDER — PROPRANOLOL HYDROCHLORIDE 10 MG/1
10 TABLET ORAL 2 TIMES DAILY
Qty: 60 TABLET | Refills: 0 | Status: SHIPPED | OUTPATIENT
Start: 2024-03-04 | End: 2024-03-05

## 2024-03-05 RX ORDER — PROPRANOLOL HYDROCHLORIDE 10 MG/1
10 TABLET ORAL 2 TIMES DAILY
Qty: 180 TABLET | Refills: 1 | Status: SHIPPED | OUTPATIENT
Start: 2024-03-05 | End: 2024-10-03

## 2024-03-20 ENCOUNTER — OFFICE VISIT (OUTPATIENT)
Dept: PHYSICAL MEDICINE AND REHAB | Facility: CLINIC | Age: 54
End: 2024-03-20

## 2024-03-20 VITALS — DIASTOLIC BLOOD PRESSURE: 75 MMHG | SYSTOLIC BLOOD PRESSURE: 115 MMHG | HEART RATE: 81 BPM

## 2024-03-20 DIAGNOSIS — G24.3 CERVICAL DYSTONIA: Primary | ICD-10-CM

## 2024-03-20 PROCEDURE — 64616 CHEMODENERV MUSC NECK DYSTON: CPT | Mod: 50 | Performed by: PHYSICAL MEDICINE & REHABILITATION

## 2024-03-20 PROCEDURE — 95873 GUIDE NERV DESTR ELEC STIM: CPT | Performed by: PHYSICAL MEDICINE & REHABILITATION

## 2024-03-20 NOTE — PROGRESS NOTES
PROCEDURE: Cervical Dystonia management with chemodenervation? with onabotulinum toxin A        Dr. Matthews     DIAGNOSIS: Right torticollis and bilateral laterocollis      PROCEDURE NOTE: Procedure note A written consent was obtained from the patient explaining the risks and benefits of the procedure. After reconstituting 1:1 using 2 cc of preservative free Normal Saline with 200 Units vial and using EMG guidance I injected the following:     Right Sternocleidomastoid 40 units in 2 sites  Right Levator Scapula 20 units in one site  Right Trapeizus 80 units in four sites        Left Sternocleidomastoid 10 units in one   Left Levator Scapula 10 units in one site  Left Trapeizus 40 units in two sites        Units discarded 0 units     Lot # ([id number])   Exp date (06/2026)       The patient tolerated the procedure well. Post procedure neuro exam was normal  Post procedure care plan was explained to the patient  Follow up in 6 weeks post chemodenervation? evaluation.

## 2024-03-20 NOTE — PROGRESS NOTES
.[facility]   PM&R clinic note        Interval history:     Najma Carvalho presents to clinic today for follow up reg her rehab needs.   She has h/o She has h/o Right torticollis and bilateral laterocollis    Was last seen in clinic 2/27/24  Recommendations included continue chemodenervation to manage cervical dystonia.    Medical issues since last visit,    Reports numbness in left cheek. No changes in health status otherwise.    Swallowing concerns that has not changed from baseline. Did not see the need for swallowing evaluation for now. She still was ok in doing BOTOX for cervical dystonia knowing that it may worsen her swallowing and/ or breathing.     Social history is unchanged,     Medications:  Current Outpatient Medications   Medication Sig Dispense Refill    busPIRone (BUSPAR) 10 MG tablet Take 1 tablet (10 mg) by mouth 2 times daily 180 tablet 1    clonazePAM (KLONOPIN) 0.5 MG tablet Take 1/2 tablet (0.25 mg) daily 45 tablet 0    hydrOXYzine jean (VISTARIL) 25 MG capsule Take 1-2 capsules (25-50 mg) by mouth 4 times daily as needed for anxiety 120 capsule 0    ibuprofen (ADVIL/MOTRIN) 200 MG tablet Take 200 mg by mouth every 4 hours as needed for pain      lamoTRIgine (LAMICTAL) 200 MG tablet Take 1 tablet (200 mg) by mouth daily 90 tablet 1    Melatonin 10 MG TABS tablet Take 10 mg by mouth nightly as needed for sleep      propranolol (INDERAL) 10 MG tablet Take 1 tablet (10 mg) by mouth 2 times daily 180 tablet 1    QUEtiapine (SEROQUEL) 50 MG tablet Take 1 tablet (50 mg) by mouth at bedtime 90 tablet 1              Physical Exam:   There were no vitals taken for this visit.  Gen: NAD, pleasant and cooperative   Neuro/MSK:   Neck exam showed chin rotated to the right side with lateral deviation to the right. SCM tightness at the proximal insertion was noted.  Normal active ROM in neck directions: the following: flex, ext, sideways and rotation with limited  rotation to the right side     No change in exam    Labs/Imaging:  Lab Results   Component Value Date    WBC 4.9 02/04/2023    HGB 14.6 02/04/2023    HCT 43.1 02/04/2023    MCV 90 02/04/2023     02/04/2023     Lab Results   Component Value Date     02/04/2023    POTASSIUM 3.7 02/04/2023    CHLORIDE 107 02/04/2023    CO2 23 02/04/2023     02/04/2023     Lab Results   Component Value Date    GFRESTIMATED 85 02/04/2023     Lab Results   Component Value Date    AST 17 05/07/2019    ALT 15 05/07/2019    ALKPHOS 81 05/07/2019    BILITOTAL 0.4 05/07/2019     Lab Results   Component Value Date    INR 0.96 02/04/2023     Lab Results   Component Value Date    BUN 11 02/04/2023    CR 0.83 02/04/2023              Assessment/Plan     .(G24.3) Cervical dystonia  (primary encounter diagnosis)    Interventions: see procedure note of cervical dystonia management with 200 units of onabotulinum toxin A     Follow up: 6 weeks for post procedure follow up. To consider swallowing evaluation referral then.       Kimmy Matthews MD  Physical Medicine & Rehabilitation      30 minutes spent on the date of the encounter doing chart review, history and exam, documentation and further activities as noted above. 20 min was spent doing the procedure

## 2024-03-20 NOTE — LETTER
3/20/2024         RE: Najma Carvalho  [address] [place] [id number]        Dear Colleague,    Thank you for referring your patient, Najma Carvalho, to the [facility]. Please see a copy of my visit note below.    .General acute hospital   PM&R clinic note        Interval history:     Najma Carvalho presents to clinic today for follow up reg her rehab needs.   She has h/o She has h/o Right torticollis and bilateral laterocollis    Was last seen in clinic 2/27/24  Recommendations included continue chemodenervation to manage cervical dystonia.    Medical issues since last visit,    Reports numbness in left cheek. No changes in health status otherwise.    Swallowing concerns that has not changed from baseline. Did not see the need for swallowing evaluation for now. She still was ok in doing BOTOX for cervical dystonia knowing that it may worsen her swallowing and/ or breathing.     Social history is unchanged,     Medications:  Current Outpatient Medications   Medication Sig Dispense Refill     busPIRone (BUSPAR) 10 MG tablet Take 1 tablet (10 mg) by mouth 2 times daily 180 tablet 1     clonazePAM (KLONOPIN) 0.5 MG tablet Take 1/2 tablet (0.25 mg) daily 45 tablet 0     hydrOXYzine jean (VISTARIL) 25 MG capsule Take 1-2 capsules (25-50 mg) by mouth 4 times daily as needed for anxiety 120 capsule 0     ibuprofen (ADVIL/MOTRIN) 200 MG tablet Take 200 mg by mouth every 4 hours as needed for pain       lamoTRIgine (LAMICTAL) 200 MG tablet Take 1 tablet (200 mg) by mouth daily 90 tablet 1     Melatonin 10 MG TABS tablet Take 10 mg by mouth nightly as needed for sleep       propranolol (INDERAL) 10 MG tablet Take 1 tablet (10 mg) by mouth 2 times daily 180 tablet 1     QUEtiapine (SEROQUEL) 50 MG tablet Take 1 tablet (50 mg) by mouth at bedtime 90 tablet 1              Physical Exam:   There were no vitals taken for this visit.  Gen: NAD, pleasant and  cooperative   Neuro/MSK:   Neck exam showed chin rotated to the right side with lateral deviation to the right. SCM tightness at the proximal insertion was noted.  Normal active ROM in neck directions: the following: flex, ext, sideways and rotation with limited rotation to the right side     No change in exam    Labs/Imaging:  Lab Results   Component Value Date    WBC 4.9 02/04/2023    HGB 14.6 02/04/2023    HCT 43.1 02/04/2023    MCV 90 02/04/2023     02/04/2023     Lab Results   Component Value Date     02/04/2023    POTASSIUM 3.7 02/04/2023    CHLORIDE 107 02/04/2023    CO2 23 02/04/2023     02/04/2023     Lab Results   Component Value Date    GFRESTIMATED 85 02/04/2023     Lab Results   Component Value Date    AST 17 05/07/2019    ALT 15 05/07/2019    ALKPHOS 81 05/07/2019    BILITOTAL 0.4 05/07/2019     Lab Results   Component Value Date    INR 0.96 02/04/2023     Lab Results   Component Value Date    BUN 11 02/04/2023    CR 0.83 02/04/2023              Assessment/Plan     .(G24.3) Cervical dystonia  (primary encounter diagnosis)    Interventions: see procedure note of cervical dystonia management with 200 units of onabotulinum toxin A     Follow up: 6 weeks for post procedure follow up. To consider swallowing evaluation referral then.       Kimmy Matthews MD  Physical Medicine & Rehabilitation      30 minutes spent on the date of the encounter doing chart review, history and exam, documentation and further activities as noted above. 20 min was spent doing the procedure          PROCEDURE: Cervical Dystonia management with chemodenervation? with onabotulinum toxin A        Dr. Matthews     DIAGNOSIS: Right torticollis and bilateral laterocollis      PROCEDURE NOTE: Procedure note A written consent was obtained from the patient explaining the risks and benefits of the procedure. After reconstituting 1:1 using 2 cc of preservative free Normal Saline with 200 Units vial and using  EMG guidance I injected the following:     Right Sternocleidomastoid 40 units in 2 sites  Right Levator Scapula 20 units in one site  Right Trapeizus 80 units in four sites        Left Sternocleidomastoid 10 units in one   Left Levator Scapula 10 units in one site  Left Trapeizus 40 units in two sites        Units discarded 0 units     Lot # ([id number])   Exp date (06/2026)       The patient tolerated the procedure well. Post procedure neuro exam was normal  Post procedure care plan was explained to the patient  Follow up in 6 weeks post chemodenervation? evaluation.      Again, thank you for allowing me to participate in the care of your patient.        Sincerely,        Kimmy Matthews MD

## 2024-04-02 DIAGNOSIS — M43.6 RIGHT TORTICOLLIS: Primary | ICD-10-CM

## 2024-04-02 DIAGNOSIS — G24.3 LATEROCOLLIS: ICD-10-CM

## 2024-04-18 ENCOUNTER — OFFICE VISIT (OUTPATIENT)
Dept: PSYCHIATRY | Facility: CLINIC | Age: 54
End: 2024-04-18

## 2024-04-18 VITALS
HEART RATE: 66 BPM | WEIGHT: 206 LBS | HEIGHT: 67 IN | OXYGEN SATURATION: 98 % | BODY MASS INDEX: 32.33 KG/M2 | SYSTOLIC BLOOD PRESSURE: 125 MMHG | DIASTOLIC BLOOD PRESSURE: 66 MMHG

## 2024-04-18 DIAGNOSIS — F31.62 BIPOLAR DISORDER, CURRENT EPISODE MIXED, MODERATE (H): Primary | ICD-10-CM

## 2024-04-18 DIAGNOSIS — F41.1 GAD (GENERALIZED ANXIETY DISORDER): ICD-10-CM

## 2024-04-18 PROCEDURE — 99214 OFFICE O/P EST MOD 30 MIN: CPT | Performed by: NURSE PRACTITIONER

## 2024-04-18 RX ORDER — ALPRAZOLAM 0.5 MG/1
0.5 TABLET, EXTENDED RELEASE ORAL
COMMUNITY
End: 2024-07-18

## 2024-04-18 RX ORDER — QUETIAPINE FUMARATE 50 MG/1
50-100 TABLET, FILM COATED ORAL AT BEDTIME
Qty: 135 TABLET | Refills: 1 | Status: SHIPPED | OUTPATIENT
Start: 2024-04-18 | End: 2024-07-18

## 2024-04-18 RX ORDER — CLONAZEPAM 0.5 MG/1
TABLET ORAL
Qty: 45 TABLET | Refills: 0 | Status: CANCELLED | OUTPATIENT
Start: 2024-04-18

## 2024-04-18 RX ORDER — HYDROXYZINE PAMOATE 25 MG/1
25-50 CAPSULE ORAL 4 TIMES DAILY PRN
Qty: 120 CAPSULE | Refills: 0 | Status: SHIPPED | OUTPATIENT
Start: 2024-04-18 | End: 2024-07-18

## 2024-04-18 RX ORDER — CLONAZEPAM 0.5 MG/1
TABLET ORAL
Qty: 45 TABLET | Refills: 1 | Status: SHIPPED | OUTPATIENT
Start: 2024-04-18 | End: 2024-07-18

## 2024-04-18 ASSESSMENT — ANXIETY QUESTIONNAIRES
7. FEELING AFRAID AS IF SOMETHING AWFUL MIGHT HAPPEN: SEVERAL DAYS
IF YOU CHECKED OFF ANY PROBLEMS ON THIS QUESTIONNAIRE, HOW DIFFICULT HAVE THESE PROBLEMS MADE IT FOR YOU TO DO YOUR WORK, TAKE CARE OF THINGS AT HOME, OR GET ALONG WITH OTHER PEOPLE: SOMEWHAT DIFFICULT
GAD7 TOTAL SCORE: 8
6. BECOMING EASILY ANNOYED OR IRRITABLE: MORE THAN HALF THE DAYS
GAD7 TOTAL SCORE: 8
4. TROUBLE RELAXING: SEVERAL DAYS
3. WORRYING TOO MUCH ABOUT DIFFERENT THINGS: SEVERAL DAYS
2. NOT BEING ABLE TO STOP OR CONTROL WORRYING: SEVERAL DAYS
GAD7 TOTAL SCORE: 8
1. FEELING NERVOUS, ANXIOUS, OR ON EDGE: SEVERAL DAYS
7. FEELING AFRAID AS IF SOMETHING AWFUL MIGHT HAPPEN: SEVERAL DAYS
8. IF YOU CHECKED OFF ANY PROBLEMS, HOW DIFFICULT HAVE THESE MADE IT FOR YOU TO DO YOUR WORK, TAKE CARE OF THINGS AT HOME, OR GET ALONG WITH OTHER PEOPLE?: SOMEWHAT DIFFICULT
5. BEING SO RESTLESS THAT IT IS HARD TO SIT STILL: SEVERAL DAYS

## 2024-04-18 ASSESSMENT — PATIENT HEALTH QUESTIONNAIRE - PHQ9
SUM OF ALL RESPONSES TO PHQ QUESTIONS 1-9: 7
SUM OF ALL RESPONSES TO PHQ QUESTIONS 1-9: 7
10. IF YOU CHECKED OFF ANY PROBLEMS, HOW DIFFICULT HAVE THESE PROBLEMS MADE IT FOR YOU TO DO YOUR WORK, TAKE CARE OF THINGS AT HOME, OR GET ALONG WITH OTHER PEOPLE: SOMEWHAT DIFFICULT

## 2024-04-18 ASSESSMENT — PAIN SCALES - GENERAL: PAINLEVEL: MODERATE PAIN (4)

## 2024-04-18 NOTE — PROGRESS NOTES
"         Outpatient Psychiatric Progress Note    Name: Najma Carvalho   : 1970                    Primary Care Provider: Cris Gomez MD   Therapist: yes     PHQ-9 scores:      10/5/2023    12:00 PM 1/15/2024    11:00 AM 2024     6:58 AM   PHQ-9 SCORE   PHQ-9 Total Score MyChart   7 (Mild depression)   PHQ-9 Total Score 11 12 7       SANTIAGO-7 scores:      10/5/2023    12:00 PM 1/15/2024    11:00 AM 2024     6:59 AM   SANTIAGO-7 SCORE   Total Score   8 (mild anxiety)   Total Score 16 21 8       Patient Identification:    Patient is a 53 year old year old,   White Not  or  female  who presents for return visit with me.  Patient is currently unemployed. Patient attended the session alone. Patient prefers to be called: \" Najma\".    Current medications include:   Current Outpatient Medications   Medication Sig Dispense Refill    busPIRone (BUSPAR) 10 MG tablet Take 1 tablet (10 mg) by mouth 2 times daily 180 tablet 1    clonazePAM (KLONOPIN) 0.5 MG tablet Take 1/2 tablet (0.25 mg) daily 45 tablet 0    hydrOXYzine jean (VISTARIL) 25 MG capsule Take 1-2 capsules (25-50 mg) by mouth 4 times daily as needed for anxiety 120 capsule 0    ibuprofen (ADVIL/MOTRIN) 200 MG tablet Take 200 mg by mouth every 4 hours as needed for pain      lamoTRIgine (LAMICTAL) 200 MG tablet Take 1 tablet (200 mg) by mouth daily 90 tablet 1    Melatonin 10 MG TABS tablet Take 10 mg by mouth nightly as needed for sleep      propranolol (INDERAL) 10 MG tablet Take 1 tablet (10 mg) by mouth 2 times daily 180 tablet 1    QUEtiapine (SEROQUEL) 50 MG tablet Take 1 tablet (50 mg) by mouth at bedtime 90 tablet 1     Current Facility-Administered Medications   Medication Dose Route Frequency Provider Last Rate Last Admin    botulinum toxin type A (BOTOX) 100 units injection 200 Units  200 Units Intramuscular Q90 Days Kimmy Matthews MD        botulinum toxin type A (BOTOX) 100 units injection 300 Units  300 " Units Intramuscular Q90 Days Kimmy Matthews MD   200 Units at 03/20/24 1202        The [place] Prescription Monitoring Program has been reviewed and there are no concerns about diversionary activity for controlled substances at this time.      I was able to review most recent Primary Care Provider, specialty provider, and therapy visit notes that I have access to.     Today, patient reports recently attended a court session with her daughters regarding divorce proceedings with her .  While her anxiety is still there it is less intense.  She has not experienced any panic attacks with dissociative episodes.  When she takes 2 tablets of quetiapine and 1 tablet of melatonin she reports sleeping well.  She is receiving Botox injections in her neck and this is helping her to experience less pain and tightness.  Najma continues to avoid large crowds.  She has little tolerance for environments that are stressful.  She is hypervigilant about having any contact with her .     has no past medical history on file.    Social history updates:    Ti lives with her 2 daughters in the family home.  She is unemployed.    Substance use updates:    No alcohol use reported  Tobacco use: No    Vital Signs:   There were no vitals taken for this visit.    Labs:    Most recent laboratory results reviewed and no new labs.     Mental Status Examination:  Appearance: awake, alert, casual dress, and mild distress  Attitude: cooperative  Eye Contact:  adequate  Gait and Station: No dizziness or falls  Psychomotor Behavior:  intact station, gait and muscle tone  Oriented to:  time, person, and place  Attention Span and Concentration:  Normal  Speech:   vtspeech: clear, coherent and Speaks: English  Mood:  anxious and depressed  Affect:  mood congruent  Associations:  no loose associations  Thought Process:  goal oriented  Thought Content:  no evidence of suicidal ideation or homicidal ideation, no auditory hallucinations  present, and no visual hallucinations present  Recent and Remote Memory:  intact Not formally assessed. No amnesia.  Fund of Knowledge: appropriate  Insight:  good  Judgment: good  Impulse Control:  good    Suicide Risk Assessment:  Today Najma Carvalho reports no thoughts to harm themself or others. In addition, there are notable risk factors for self-harm, including anxiety. However, risk is mitigated by commitment to family, history of seeking help when needed, future oriented, denies suicidal intent or plan, and denies homicidal ideation, intent, or plan. Therefore, based on all available evidence including the factors cited above, Najma Carvalho does not appear to be at imminent risk for self-harm, does not meet criteria for a 72-hr hold, and therefore remains appropriate for ongoing outpatient level of care.  A thorough assessment of risk factors related to suicide and self-harm have been reviewed and are noted above. The patient convincingly denies suicidality on several occasions. Local community safety resources printed and reviewed for patient to use if needed. There was no deceit detected, and the patient presented in a manner that was believable.     DSM5 Diagnosis:  296.89 Bipolar II Disorder With mixed features and moderate  300.02 (F41.1) Generalized Anxiety Disorder    Medical comorbidities include:   Patient Active Problem List    Diagnosis Date Noted    Cervical dystonia 04/21/2023     Priority: Medium    Cervical myofascial pain syndrome 08/05/2022     Priority: Medium    Bipolar disorder (H) 06/24/2022     Priority: Medium    Chronic tension headache 06/24/2022     Priority: Medium    Iron deficiency anemia 06/24/2022     Priority: Medium    Obesity 06/24/2022     Priority: Medium    Oscillopsia 06/24/2022     Priority: Medium    Hemorrhoids 12/02/2021     Priority: Medium    Post-traumatic brain syndrome 08/02/2021     Priority: Medium    Whiplash 01/18/2021     Priority: Medium    GERD  with apnea 01/31/2020     Priority: Medium    Hypomania (H) 04/11/2019     Priority: Medium    SANTIAGO (generalized anxiety disorder) 04/10/2019     Priority: Medium    Migraines 04/10/2019     Priority: Medium    Serotonin withdrawal syndrome without complication 04/10/2019     Priority: Medium    Hypersomnolence 03/12/2019     Priority: Medium       Assessment:    Najma Carvalho is reporting less anxiety but continues to experience hypervigilance regarding contact with her .  No changes in medications will be made today.  Buspirone, hydroxyzine, and clonazepam continue in managing her anxiety symptoms as she worries about the future and the outcome of an impending divorce.  Quetiapine is helpful at bedtime with melatonin to maintain sleep.  Lamotrigine is stabilizing her mood at this time.  Talk therapy continues as she learn skills to manage life stressors and changes..    Medication side effects and alternatives were reviewed. Health promotion activities recommended and reviewed today. All questions addressed. Education and counseling completed regarding risks and benefits of medications and psychotherapy options.    Treatment Plan:    1.  Clonazepam 1/2 tab daily  2.  Quetiapine 50 - 100 mg at bedtime  3.  Lamotrigine 200 mg daily  4.  Hydroxyzine 25 mg up to 4 doses daily  5.  Melatonin 10 mg at  bedtime  6.  Buspirone 10 mg twice daily  7.  Continue therapy with Mata     Continue all other medications as reviewed per electronic medical record today.   Safety plan reviewed. To the Emergency Department as needed or call after hours crisis line at [phone] or [phone]. [place] Crisis Text [place]. Text [place] to 561479 or Suicide LifeLine Chat: suicidepreventionlifeline.org/chat/  To schedule individual or family therapy, call [facility] at [phone]  Schedule an appointment with me in 3 months or sooner as needed. Call [facility] at [phone] to  schedule.  Follow up with primary care provider as planned or for acute medical concerns.  Call the psychiatric nurse line with medication questions or concerns at [phone]  MyChart may be used to communicate with your provider, but this is not intended to be used for emergencies.        Crisis Resources   The [facility]Path is [facility] is a short term (generally less than 23 hour stay) designed for crisis intervention and stabilization. Pts have the opportunity to meet quickly with a behavioral health team for evaluation in a calm and peaceful therapuetic environment. To be evaluated for admission pts are triaged throught the [facility] ED.      The following hotlines are for both adults and children. The and are open 24 hours a day, 7 days a week unless noted otherwise.        Crisis Lines      Crisis Text Line  Text 469219  You will be connected with a trained live crisis counselor to provide support.        Gambling Hotline  8.520.386.6123 [hope]        línea de crisis española  [phone]        [facility]  [phone]        National Hope Line  7.925.064.1640 [hope]        National Suicide Prevention Lifeline  Free and confidential support  988 or 1.281.064.TALK [8255]  http://suicidepreventionlifeline.org        The Sean Project (LGBTQ Youth Crisis Line)  2.172.889.5542  text START to 979-422        [place]'s Crisis Line  1.521.758.0182 (Press 1)  or text 544228    [facility] Mental Health Crisis Response  Within [place], call **CRISIS [**642861] to be connected to a mental health professional who can assist you.        [facility] Crisis  [phone]      [facility] Mobile Crisis  [phone]      [facility] Crisis  [phone]      [facility] Mobile Crisis  [phone] (adults)  [phone] (children)      [facility] Mobile Crisis  [phone] (adults)  [phone] (children)      [facility]  Mobile Crisis  [phone]      [facility] Mobile Crisis  [phone]    Community Resources      Fast Tracker  Linking people to mental health and substance use disorder resources  Neronote.org        [place] Mental Health Warmline  Peer to peer support  5 pm to 9 am 7 days/week  1.723.248.3868  https://mnwitw.org/ellie        National [place] on Mental Illness (ADRI)  599.720.2937 or 1.888.ADRI.HELPS  https://namimn.org/        [facility] Urgent Care for Adult Mental Health  [facility] residents only   [address] [facility]  [phone]        [facility]  Free mental health counseling  https://walkin.org/  612.870.0565 X2    Mental Health Apps      Calm Harm  https://calmharm.co.uk/      My3  https://myU.S. Photonicspp.org/      Raquel Safety Plan  https://www.mysafetyplan.org/   Administrative Billing:   Time spent with patient includes counseling and coordination of care regarding above diagnoses and treatment plan.    Patient Status:  This is a continuous care patient and medications will be prescribed by the psychiatric provider until further indicated.    Signed:   KULDEEP ShearerBC   Psychiatry       Answers submitted by the patient for this visit:  Patient Health Questionnaire (Submitted on 4/18/2024)  If you checked off any problems, how difficult have these problems made it for you to do your work, take care of things at home, or get along with other people?: Somewhat difficult  PHQ9 TOTAL SCORE: 7  SANTIAGO-7 (Submitted on 4/18/2024)  SANTIAGO 7 TOTAL SCORE: 8

## 2024-04-18 NOTE — PATIENT INSTRUCTIONS
"Patient Education   The Panel Psychiatry Program  What to Expect  Here's what to expect in the Panel Psychiatry Program.   About the program  You'll be meeting with a psychiatric doctor to check your mental health. A psychiatric doctor helps you deal with troubling thoughts and feelings by giving you medicine. They'll make sure you know the plan for your care. You may see them for a long time. When you're feeling better, they may refer you back to seeing your family doctor.   If you have any questions, we'll be glad to talk to you.  About visits  Be open  At your visits, please talk openly about your problems. It may feel hard, but it's the best way for us to help you.  Cancelling visits  If you can't come to your visit, please call us right away at [phone]. If you don't cancel at least 24 hours (1 full day) before your visit, that's \"late cancellation.\"  Not showing up for your visits  Being very late is the same as not showing up. You'll be a \"no show\" if:  You're more than 15 minutes late for a 30-minute (half hour) visit.  You're more than 30 minutes late for a 60-minute (full hour) visit.  If you cancel late or don't show up 2 times within 6 months, we may end your care.  Getting help between visits  If you need help between visits, you can call us Monday to Friday from 8 a.m. to 4:30 p.m. at [phone].  Emergency care  Call 911 or go to the nearest emergency department if your life or someone else's life is in danger.  Call 988 anytime to reach the national Suicide and Crisis hotline.  Medicine refills  To refill your medicine, call your pharmacy. You can also call [facility]'s Behavioral Access at [phone], Monday to Friday, 8 a.m. to 4:30 p.m. It can take 1 to 3 business days to get a refill.   Forms, letters, and tests  You may have papers to fill out, like FMLA, short-term disability, and workability. We can help you with these forms at your visits, but you must have an " appointment. You may need more than 1 visit for this, to be in an intensive therapy program, or both.  Before we can give you medicine for ADHD, we may refer you to get tested for it or confirm it another way.  We may not be able to give you an emotional support animal letter.  We don't do mental health checks ordered by the court.   We don't do mental health testing, but we can refer you to get tested.   Thank you for choosing us for your care.  For informational purposes only. Not to replace the advice of your health care provider. Copyright   2022 [facility]. All rights reserved. [employer] 103322 - 12/22.       Treatment Plan:    1.  Clonazepam 1/2 tab daily  2.  Quetiapine 50 - 100 mg at bedtime  3.  Lamotrigine 200 mg daily  4.  Hydroxyzine 25 mg up to 4 doses daily  5.  Melatonin 10 mg at  bedtime  6.  Buspirone 10 mg twice daily  7.  Continue therapy with Mata    Continue all other medical directions per primary care provider.   Continue all other medications as reviewed per electronic medical record today.   Safety plan reviewed. To the Emergency Department as needed or call after hours crisis line at [phone] or [phone]. [place] Crisis Text [place]: Text [place] to 768670  or  Suicide LifeLine Chat: suicidepreventionlifeline.org/chat/  To schedule individual or family therapy, call [facility] at [phone].   Schedule an appointment with me in 3 months or sooner as needed.  Call [facility] at [phone] to schedule.  Follow up with primary care provider as planned or for acute medical concerns.  Call the psychiatric nurse line with medication questions or concerns at [phone].  M[employer] Gameshart may be used to communicate with your provider, but this is not intended to be used for emergencies.        Crisis Resources   The EmPath is an adults only unit located at [facility] in [place] is a short term (generally less than 23 hour stay) designed for  crisis intervention and stabilization. Pts have the opportunity to meet quickly with a behavioral health team for evaluation in a calm and peaceful therapuetic environment. To be evaluated for admission pts are triaged throught the [facility] ED.      The following hotlines are for both adults and children. The and are open 24 hours a day, 7 days a week unless noted otherwise.        Crisis Lines      Crisis Text Line  Text 020849  You will be connected with a trained live crisis counselor to provide support.        Gambling Hotline  4.148.110.2465 [hope]        línea de crisis española  155.606.8041        [place] Around Knowledge Helpline  748.712.0210        National Hope Line  2.088.158.0990 [hope]        National Suicide Prevention Lifeline  Free and confidential support  988 or 1.698.612.TALK [8255]  http://suicidepreventionlifeline.org        The Sean Project (LGBTQ Youth Crisis Line)  8.630.852.5452  text START to 122-440        's Crisis Line  7.557.559.0007 (Press 1)  or text 055745    [facility] Mental Health Crisis Response  Within [place], call **CRISIS [**880258] to be connected to a mental health professional who can assist you.        [facility] Crisis  [phone]      [facility] Mobile Crisis  [phone]      [facility] Crisis  [phone]      [facility] Mobile Crisis  [phone] (adults)  [phone] (children)      [facility] Mobile Crisis  [phone] (adults)  [phone] (children)      [facility] Mobile Crisis  [phone]      [facility] Mobile Crisis  [phone]    Community Resources      Fast Tracker  Linking people to mental health and substance use disorder resources  fasttrackermn.org        [place] Mental Health Warmline  Peer to peer support  5 pm to 9 am 7 days/week  2.547.945.1941  https://mnwitw.org/ellie        National [place] on Mental Illness (ADRI)  732.625.7404 or 1.888.ADRI.HELPS   https://Madison State Hospitalimn.org/        [facility] Urgent Care for Adult Mental Health  [facility] residents only   [address] [facility]  [phone]        [facility]  Free mental health counseling  https://walkin.org/  [phone] X2    Mental Health Apps      Calm Harm  https://calmharm.co.uk/      My3  https://my3app.org/      [place] Safety Plan  https://www.mysafetyplan.org/

## 2024-04-24 NOTE — TELEPHONE ENCOUNTER
---------------------  From: Franci/Keisha SANCHEZ (Phone Messages Pool ([address]_[facility]))   To: [facility] Provider [place] ([address]_[facility]);     Sent: 6/30/2021 10:27:54 AM CDT  Subject: General Message     Phone Message    PCP: DAVIE    Time of Call:1025    Phone Number: [phone]    Returned call at: n/a    Note: Patient calls stating that [facility] ordered an MRI for her. Patient is wondering if she can be prescribed a mediation to take before MRI for claustrophobia.     Please advise.     Pharmacy: [facility]    Last office visit and reason: Visual Spell 6/25/21 ALPESH    Transferred to: SHARATH---------------------  From: Hosea FELIZ, Kranthi MARTIN ([facility] Provider [place] ([address]_[facility]))   To: Phone InterviewBest [place] ([address]_[place] [facility] [place]);     Sent: 6/30/2021 2:22:27 PM CDT  Subject: RE: General Message     she is on clonazepam bid,  have her delay taking her clonazepam until an hour before the procedureSpoke with patient and gave G direction. She states her MRI is at 0700 - she will take her AM dose of Clonazepam at 0600.   [facility] Office

## 2024-04-30 ENCOUNTER — OFFICE VISIT (OUTPATIENT)
Dept: PHYSICAL MEDICINE AND REHAB | Facility: CLINIC | Age: 54
End: 2024-04-30

## 2024-04-30 VITALS — DIASTOLIC BLOOD PRESSURE: 84 MMHG | HEART RATE: 79 BPM | SYSTOLIC BLOOD PRESSURE: 127 MMHG

## 2024-04-30 DIAGNOSIS — G24.3 CERVICAL DYSTONIA: Primary | ICD-10-CM

## 2024-04-30 DIAGNOSIS — M99.01 SOMATIC DYSFUNCTION OF CERVICAL REGION: ICD-10-CM

## 2024-04-30 PROCEDURE — 99214 OFFICE O/P EST MOD 30 MIN: CPT | Performed by: PHYSICAL MEDICINE & REHABILITATION

## 2024-04-30 RX ORDER — LORATADINE 10 MG/1
10 TABLET ORAL DAILY
COMMUNITY

## 2024-04-30 RX ORDER — CHOLECALCIFEROL (VITAMIN D3) 50 MCG
1 TABLET ORAL DAILY
COMMUNITY

## 2024-04-30 RX ORDER — CALCIUM CARBONATE/VITAMIN D3 500-10/5ML
400 LIQUID (ML) ORAL DAILY
COMMUNITY

## 2024-04-30 NOTE — NURSING NOTE
Chief Complaint   Patient presents with    Follow Up     After botox  Spots where she got the shots do not feel as tight.  Feels numbness in the back of the mouth/throat/jaw  Having trouble swallowing big pills

## 2024-04-30 NOTE — LETTER
4/30/2024         RE: Najma Carvalho  [address] [place] [id number]        Dear Colleague,    Thank you for referring your patient, Najma Carvalho, to the [facility]. Please see a copy of my visit note below.    .[facility]   PM&R clinic note        Interval history:     Najma Carvalho presents to clinic today for follow up reg her rehab needs.   She has h/o cervical dystonia requiring BOTOX injection  Was last seen in clinic 4/30/24  Recommendations included 6 weeks follow-up for post BOTOX care    Medical issues since last visit,    Finds BOTOX has been helping with her dystonia. More functionally independent and active.    Needs to drink orange juice with bigger pills since the Fall. Does not report aspiration pneumonia. Did not see the need to do swallowing evaluation    Occasional numbness in left cheek, intermittent. Did not see the need to do EMG.    Functionally, no change in function    Social history is unchanged,    Medications:  Current Outpatient Medications   Medication Sig Dispense Refill     ALPRAZolam (XANAX XR) 0.5 MG 24 hr tablet Take 0.5 mg by mouth PRN       busPIRone (BUSPAR) 10 MG tablet Take 1 tablet (10 mg) by mouth 2 times daily 180 tablet 1     clonazePAM (KLONOPIN) 0.5 MG tablet Take 1/2 tablet (0.25 mg) daily 45 tablet 1     Elemental iron 65 mg Vitamin C 125 mg (VITRON C)  MG TABS tablet Take 1 tablet by mouth daily       hydrOXYzine jean (VISTARIL) 25 MG capsule Take 1-2 capsules (25-50 mg) by mouth 4 times daily as needed for anxiety 120 capsule 0     ibuprofen (ADVIL/MOTRIN) 200 MG tablet Take 200 mg by mouth every 4 hours as needed for pain       lamoTRIgine (LAMICTAL) 200 MG tablet Take 1 tablet (200 mg) by mouth daily 90 tablet 1     loratadine (CLARITIN) 10 MG tablet Take 10 mg by mouth daily       magnesium oxide 400 MG CAPS Take 400 mg by mouth daily       Melatonin 10 MG TABS tablet  Take 10 mg by mouth nightly as needed for sleep       propranolol (INDERAL) 10 MG tablet Take 1 tablet (10 mg) by mouth 2 times daily 180 tablet 1     QUEtiapine (SEROQUEL) 50 MG tablet Take 1-2 tablets ( mg) by mouth at bedtime 135 tablet 1     vitamin D3 (CHOLECALCIFEROL) 50 mcg (2000 units) tablet Take 1 tablet by mouth daily                Physical Exam:   /84 (BP Location: Right arm, Patient Position: Sitting, Cuff Size: Adult Regular)   Pulse 79   Gen: NAD, pleasant and cooperative   Neuro/MSK:   Normal neck ROM. Tight right traps noted      Labs/Imaging:  Lab Results   Component Value Date    WBC 4.9 02/04/2023    HGB 14.6 02/04/2023    HCT 43.1 02/04/2023    MCV 90 02/04/2023     02/04/2023     Lab Results   Component Value Date     02/04/2023    POTASSIUM 3.7 02/04/2023    CHLORIDE 107 02/04/2023    CO2 23 02/04/2023     02/04/2023     Lab Results   Component Value Date    GFRESTIMATED 85 02/04/2023     Lab Results   Component Value Date    AST 17 05/07/2019    ALT 15 05/07/2019    ALKPHOS 81 05/07/2019    BILITOTAL 0.4 05/07/2019     Lab Results   Component Value Date    INR 0.96 02/04/2023     Lab Results   Component Value Date    BUN 11 02/04/2023    CR 0.83 02/04/2023              Assessment/Plan     .(M99.01) Somatic dysfunction of cervical region  (primary encounter diagnosis)      Interventions: plan to do the following doses:    Right Sternocleidomastoid 40 units in 2 sites  Right Levator Scapula 20 units in one site: to change to 40 units  Right Trapeizus 80 units in four sites: to change to 110 units        Left Sternocleidomastoid 10 units in one   Left Levator Scapula 10 units in one site  Left Trapeizus 40 units in two sites      Follow up: 6 week      Kimmy Matthews MD  Physical Medicine & Rehabilitation    30 minutes spent on the date of the encounter doing chart review, history and exam, documentation and further activities as noted above          Again,  thank you for allowing me to participate in the care of your patient.        Sincerely,        Kimmy Matthews MD

## 2024-04-30 NOTE — PROGRESS NOTES
.[facility]   PM&R clinic note        Interval history:     Najma Carvalho presents to clinic today for follow up reg her rehab needs.   She has h/o cervical dystonia requiring BOTOX injection  Was last seen in clinic 4/30/24  Recommendations included 6 weeks follow-up for post BOTOX care    Medical issues since last visit,    Finds BOTOX has been helping with her dystonia. More functionally independent and active.    Needs to drink orange juice with bigger pills since the Fall. Does not report aspiration pneumonia. Did not see the need to do swallowing evaluation    Occasional numbness in left cheek, intermittent. Did not see the need to do EMG.    Functionally, no change in function    Social history is unchanged,    Medications:  Current Outpatient Medications   Medication Sig Dispense Refill    ALPRAZolam (XANAX XR) 0.5 MG 24 hr tablet Take 0.5 mg by mouth PRN      busPIRone (BUSPAR) 10 MG tablet Take 1 tablet (10 mg) by mouth 2 times daily 180 tablet 1    clonazePAM (KLONOPIN) 0.5 MG tablet Take 1/2 tablet (0.25 mg) daily 45 tablet 1    Elemental iron 65 mg Vitamin C 125 mg (VITRON C)  MG TABS tablet Take 1 tablet by mouth daily      hydrOXYzine jean (VISTARIL) 25 MG capsule Take 1-2 capsules (25-50 mg) by mouth 4 times daily as needed for anxiety 120 capsule 0    ibuprofen (ADVIL/MOTRIN) 200 MG tablet Take 200 mg by mouth every 4 hours as needed for pain      lamoTRIgine (LAMICTAL) 200 MG tablet Take 1 tablet (200 mg) by mouth daily 90 tablet 1    loratadine (CLARITIN) 10 MG tablet Take 10 mg by mouth daily      magnesium oxide 400 MG CAPS Take 400 mg by mouth daily      Melatonin 10 MG TABS tablet Take 10 mg by mouth nightly as needed for sleep      propranolol (INDERAL) 10 MG tablet Take 1 tablet (10 mg) by mouth 2 times daily 180 tablet 1    QUEtiapine (SEROQUEL) 50 MG tablet Take 1-2 tablets ( mg) by mouth at bedtime 135 tablet 1    vitamin D3  (CHOLECALCIFEROL) 50 mcg (2000 units) tablet Take 1 tablet by mouth daily                Physical Exam:   /84 (BP Location: Right arm, Patient Position: Sitting, Cuff Size: Adult Regular)   Pulse 79   Gen: NAD, pleasant and cooperative   Neuro/MSK:   Normal neck ROM. Tight right traps noted      Labs/Imaging:  Lab Results   Component Value Date    WBC 4.9 02/04/2023    HGB 14.6 02/04/2023    HCT 43.1 02/04/2023    MCV 90 02/04/2023     02/04/2023     Lab Results   Component Value Date     02/04/2023    POTASSIUM 3.7 02/04/2023    CHLORIDE 107 02/04/2023    CO2 23 02/04/2023     02/04/2023     Lab Results   Component Value Date    GFRESTIMATED 85 02/04/2023     Lab Results   Component Value Date    AST 17 05/07/2019    ALT 15 05/07/2019    ALKPHOS 81 05/07/2019    BILITOTAL 0.4 05/07/2019     Lab Results   Component Value Date    INR 0.96 02/04/2023     Lab Results   Component Value Date    BUN 11 02/04/2023    CR 0.83 02/04/2023              Assessment/Plan     .(M99.01) Somatic dysfunction of cervical region  (primary encounter diagnosis)      Interventions: plan to do the following doses:    Right Sternocleidomastoid 40 units in 2 sites  Right Levator Scapula 20 units in one site: to change to 40 units  Right Trapeizus 80 units in four sites: to change to 110 units        Left Sternocleidomastoid 10 units in one   Left Levator Scapula 10 units in one site  Left Trapeizus 40 units in two sites      Follow up: 6 week      Kimmy Matthews MD  Physical Medicine & Rehabilitation    30 minutes spent on the date of the encounter doing chart review, history and exam, documentation and further activities as noted above

## 2024-05-17 NOTE — PATIENT INSTRUCTIONS
Uploaded ABBY DM EYE EXAM ; faxed to [facility] 1x to request. Reminder set 1 week.   "Patient Education   The [facility]  What to Expect  Here's what to expect in the [facility] Psych[facility] Program.   About the program  You'll be meeting with a psychiatric doctor to check your mental health. A psychiatric doctor helps you deal with troubling thoughts and feelings by giving you medicine. They'll make sure you know the plan for your care. You may see them for a long time. When you're feeling better, they may refer you back to seeing your family doctor.   If you have any questions, we'll be glad to talk to you.  About visits  Be open  At your visits, please talk openly about your problems. It may feel hard, but it's the best way for us to help you.  Cancelling visits  If you can't come to your visit, please call us right away at [phone]. If you don't cancel at least 24 hours (1 full day) before your visit, that's \"late cancellation.\"  Not showing up for your visits  Being very late is the same as not showing up. You'll be a \"no show\" if:  You're more than 15 minutes late for a 30-minute (half hour) visit.  You're more than 30 minutes late for a 60-minute (full hour) visit.  If you cancel late or don't show up 2 times within 6 months, we may end your care.  Getting help between visits  If you need help between visits, you can call us Monday to Friday from 8 a.m. to 4:30 p.m. at [phone].  Emergency care  Call 911 or go to the nearest emergency department if your life or someone else's life is in danger.  Call 988 anytime to reach the national Suicide and Crisis hotline.  Medicine refills  To refill your medicine, call your pharmacy. You can also call [facility]'s [facility] at [phone], Monday to Friday, 8 a.m. to 4:30 p.m. It can take 1 to 3 business days to get a refill.   Forms, letters, and tests  You may have papers to fill out, like FMLA, short-term disability, and workability. We can help you with these forms at your visits, but you must have an " appointment. You may need more than 1 visit for this, to be in an intensive therapy program, or both.  Before we can give you medicine for ADHD, we may refer you to get tested for it or confirm it another way.  We may not be able to give you an emotional support animal letter.  We don't do mental health checks ordered by the court.   We don't do mental health testing, but we can refer you to get tested.   Thank you for choosing us for your care.  For informational purposes only. Not to replace the advice of your health care provider. Copyright   2022 [facility]. All rights reserved. Yarraa [id number] - 12/22.         Treatment Plan:    1.  Buspirone increased to 10 mg 2 times daily   2.  Clonazepam 1 mg 2 times daily for severe anxiety-take sparingly   3.  Lamotrigine 200 mg daily  4.  Quetiapine 50 mg at bedtime  5.  Continue talk therapy for helping you learn skills to manage life stressors    Support her need for an emotional support animal while there.  Continue all other medications as reviewed per electronic medical record today.   Safety plan reviewed. To the Emergency Department as needed or call after hours crisis line at [phone] or [phone]. [place] Crisis Text Line. Text [place] to 491306 or Suicide LifeLine Chat: suicidepreventionlifeline.org/chat/  To schedule individual or family therapy, call [facility] at [phone]  Schedule an appointment with me in 2 months or sooner as needed. Call [facility] at [phone] to schedule.  Follow up with primary care provider as planned or for acute medical concerns.  Call the psychiatric nurse line with medication questions or concerns at [phone]  MyChart may be used to communicate with your provider, but this is not intended to be used for emergencies.    Crisis Resources:    National Suicide Prevention Lifeline: 772.821.4415 (TTY: 624.723.8977). Call anytime for help.   (www.suicidepreventionlifeline.org)  National [employer] Mental Illness (www.feliciano.org): 241-384-7502 or 923-461-2015.   Mental Health Association (www.mentalhealth.org): 180.881.3042 or 150-811-0004.  [place] Crisis Text Line: Text [place] to 837480  Suicide LifeLine Chat: suicidepreventionRaptline.org/chat

## 2024-05-30 NOTE — NURSING NOTE
Comprehensive Intake Entered On:  11/30/2021 3:28 PM CST    Performed On:  11/30/2021 3:23 PM CST by Kong SANCHEZ, Sandra               Summary   Chief Complaint :   c/o migraine xSaturday night.   also c/o low back pain since PT Sunday.     Menstrual Status :   Menarcheal   Weight Measured :   202.5 lb(Converted to: 202 lb 8 oz, 91.852 kg)    Height Measured :   67 in(Converted to: 5 ft 7 in, 170.18 cm)    Body Mass Index :   31.71 kg/m2 (HI)    Body Surface Area :   2.08 m2   Height/Length Estimated :   67.5 in(Converted to: 5 ft 7 in, 171.45 cm)    Systolic Blood Pressure :   144 mmHg (HI)    Diastolic Blood Pressure :   90 mmHg (HI)    Mean Arterial Pressure :   108 mmHg   Peripheral Pulse Rate :   81 bpm   BP Site :   Right arm   Pulse Site :   Brachial artery   BP Method :   Electronic   HR Method :   Electronic   Temperature Tympanic :   97 DegF(Converted to: 36.1 DegC)  (LOW)    Kong SANCHEZ, Sandra - 11/30/2021 3:23 PM CST   Health Status   Allergies Verified? :   Yes   Medication History Verified? :   Yes   Medical History Verified? :   Yes   Pre-Visit Planning Status :   Completed   Tobacco Use? :   Never smoker   Sandra Triana MA - 11/30/2021 3:23 PM CST   Consents   Consent for Immunization Exchange :   Consent Granted   Consent for Immunizations to Providers :   Consent Granted   Sandra Triana MA - 11/30/2021 3:23 PM CST   Meds / Allergies   (As Of: 11/30/2021 3:28:01 PM CST)   Allergies (Active)   codeine  Estimated Onset Date:   Unspecified ; Created By:   [employer] Domain User for [id number]; Reaction Status:   Active ; Substance:   codeine ; Updated By:   Generated Domain User for [id number]; Source:   Patient ; Reviewed Date:   10/19/2021 10:38 AM CDT      Depakote  Estimated Onset Date:   Unspecified ; Reactions:   Diarrhea ; Comments:     Comment 1: developed severe diarrhea at high doses   ; Created By:   Cris Gomez MD; Reaction Status:   Active ; Category:   Drug ; Substance:   Depakote ; Type:    Secondary Effect ; Updated By:   Cris Gomez MD; Reviewed Date:   10/19/2021 10:38 AM CDT        Medication List   (As Of: 11/30/2021 3:28:01 PM CST)   Prescription/Discharge Order    clonazePAM  :   clonazePAM ; Status:   Prescribed ; Ordered As Mnemonic:   clonazePAM 1 mg oral tablet ; Simple Display Line:   1 mg, 1 tab(s), Oral, bid, 180 tab(s), 1 Refill(s) ; Ordering Provider:   Cris Gomez MD; Catalog Code:   clonazePAM ; Order Dt/Tm:   9/10/2020 11:24:24 AM CDT          lamoTRIgine  :   lamoTRIgine ; Status:   Prescribed ; Ordered As Mnemonic:   LaMICtal 100 mg oral tablet ; Simple Display Line:   200 mg, 2 tab(s), Oral, daily, 270 tab(s), 3 Refill(s) ; Ordering Provider:   Irwin Eckert MD; Catalog Code:   lamoTRIgine ; Order Dt/Tm:   3/13/2020 1:57:43 PM CDT            Home Meds    ferrous sulfate  :   ferrous sulfate ; Status:   Documented ; Ordered As Mnemonic:   ferrous sulfate 325 mg (65 mg elemental iron) oral delayed release tablet ; Simple Display Line:   325 mg, 1 tab(s), Oral, daily, 0 Refill(s) ; Catalog Code:   ferrous sulfate ; Order Dt/Tm:   7/5/2021 2:55:34 PM CDT          loratadine-pseudoephedrine  :   loratadine-pseudoephedrine ; Status:   Documented ; Ordered As Mnemonic:   Claritin-D 12 Hour oral tablet, extended release ; Simple Display Line:   1 tab(s), Oral, q12 hrs, PRN: allergy symptoms, 0 Refill(s) ; Catalog Code:   loratadine-pseudoephedrine ; Order Dt/Tm:   1/31/2020 8:30:45 AM CST          QUEtiapine  :   QUEtiapine ; Status:   Documented ; Ordered As Mnemonic:   QUEtiapine 25 mg oral tablet ; Simple Display Line:   25 mg, 1 tab(s), 0 Refill(s) ; Catalog Code:   QUEtiapine ; Order Dt/Tm:   4/6/2021 3:30:37 PM CDT            Social History   Social History   (As Of: 11/30/2021 3:28:01 PM CST)   Alcohol:        Never   (Last Updated: 3/13/2019 10:12:21 AM CDT by Erica Gordillo)          Tobacco:        Never (less than 100 in lifetime)   (Last Updated:  1/25/2021 8:21:32 AM CST by Elsy Doss LPN)          Electronic Cigarette/Vaping:        Electronic Cigarette Use: Never.   (Last Updated: 1/25/2021 8:21:36 AM CST by Elsy Doss LPN)          Substance Abuse:        Never   (Last Updated: 3/14/2019 3:36:43 PM CDT by Cori Schwartz)          Employment/School:        Highest education level: University degree(s).   (Last Updated: 3/14/2019 3:37:34 PM CDT by Cori Schwartz)          Home/Environment:        Marital status: .  Living situation: Home/Independent.  Injuries/Abuse/Neglect in household: No.   (Last Updated: 3/14/2019 3:37:54 PM CDT by Cori Schwartz)          Nutrition/Health:        Type of diet: Regular.   (Last Updated: 3/14/2019 3:36:50 PM CDT by Cori Schwartz)          Exercise:        Exercise frequency: Daily.  Exercise type: Walking dogs, elliptical.   (Last Updated: 3/14/2019 3:37:21 PM CDT by Cori Schwartz)          Sexual:        Identifies as female, Sexual orientation: Straight or heterosexual.  History of STD: No.  Uses condoms: No.  Contraceptive Use Details: Birth control pill.  History of sexual abuse: No.   (Last Updated: 3/14/2019 3:38:34 PM CDT by Cori Schwartz)   Detail Level: Zone

## 2024-07-14 ENCOUNTER — HEALTH MAINTENANCE LETTER (OUTPATIENT)
Age: 54
End: 2024-07-14

## 2024-07-17 ASSESSMENT — ANXIETY QUESTIONNAIRES
3. WORRYING TOO MUCH ABOUT DIFFERENT THINGS: MORE THAN HALF THE DAYS
4. TROUBLE RELAXING: NEARLY EVERY DAY
7. FEELING AFRAID AS IF SOMETHING AWFUL MIGHT HAPPEN: SEVERAL DAYS
8. IF YOU CHECKED OFF ANY PROBLEMS, HOW DIFFICULT HAVE THESE MADE IT FOR YOU TO DO YOUR WORK, TAKE CARE OF THINGS AT HOME, OR GET ALONG WITH OTHER PEOPLE?: SOMEWHAT DIFFICULT
GAD7 TOTAL SCORE: 15
5. BEING SO RESTLESS THAT IT IS HARD TO SIT STILL: MORE THAN HALF THE DAYS
6. BECOMING EASILY ANNOYED OR IRRITABLE: MORE THAN HALF THE DAYS
GAD7 TOTAL SCORE: 15
IF YOU CHECKED OFF ANY PROBLEMS ON THIS QUESTIONNAIRE, HOW DIFFICULT HAVE THESE PROBLEMS MADE IT FOR YOU TO DO YOUR WORK, TAKE CARE OF THINGS AT HOME, OR GET ALONG WITH OTHER PEOPLE: SOMEWHAT DIFFICULT
2. NOT BEING ABLE TO STOP OR CONTROL WORRYING: MORE THAN HALF THE DAYS
GAD7 TOTAL SCORE: 15
7. FEELING AFRAID AS IF SOMETHING AWFUL MIGHT HAPPEN: SEVERAL DAYS
1. FEELING NERVOUS, ANXIOUS, OR ON EDGE: NEARLY EVERY DAY

## 2024-07-18 ENCOUNTER — OFFICE VISIT (OUTPATIENT)
Dept: PSYCHIATRY | Facility: CLINIC | Age: 54
End: 2024-07-18

## 2024-07-18 VITALS
HEIGHT: 67 IN | DIASTOLIC BLOOD PRESSURE: 78 MMHG | WEIGHT: 215 LBS | BODY MASS INDEX: 33.74 KG/M2 | OXYGEN SATURATION: 96 % | HEART RATE: 74 BPM | SYSTOLIC BLOOD PRESSURE: 121 MMHG

## 2024-07-18 DIAGNOSIS — F31.62 BIPOLAR DISORDER, CURRENT EPISODE MIXED, MODERATE (H): ICD-10-CM

## 2024-07-18 DIAGNOSIS — F41.0 PANIC ATTACK: Primary | ICD-10-CM

## 2024-07-18 DIAGNOSIS — F41.1 GAD (GENERALIZED ANXIETY DISORDER): ICD-10-CM

## 2024-07-18 PROCEDURE — 99214 OFFICE O/P EST MOD 30 MIN: CPT | Performed by: NURSE PRACTITIONER

## 2024-07-18 RX ORDER — CLONAZEPAM 0.5 MG/1
0.25 TABLET ORAL 2 TIMES DAILY PRN
Qty: 45 TABLET | Refills: 3 | Status: SHIPPED | OUTPATIENT
Start: 2024-07-18

## 2024-07-18 RX ORDER — DIPHENHYDRAMINE HCL 25 MG
25-50 TABLET ORAL EVERY 6 HOURS PRN
COMMUNITY

## 2024-07-18 RX ORDER — HYDROXYZINE PAMOATE 25 MG/1
25-50 CAPSULE ORAL 4 TIMES DAILY PRN
Qty: 120 CAPSULE | Refills: 1 | Status: SHIPPED | OUTPATIENT
Start: 2024-07-18

## 2024-07-18 RX ORDER — ALPRAZOLAM 0.5 MG/1
0.5 TABLET, EXTENDED RELEASE ORAL PRN
Qty: 10 TABLET | Refills: 0 | Status: SHIPPED | OUTPATIENT
Start: 2024-07-18

## 2024-07-18 RX ORDER — BUSPIRONE HYDROCHLORIDE 10 MG/1
10 TABLET ORAL 2 TIMES DAILY
Qty: 180 TABLET | Refills: 1 | Status: SHIPPED | OUTPATIENT
Start: 2024-07-18

## 2024-07-18 RX ORDER — QUETIAPINE FUMARATE 50 MG/1
50-100 TABLET, FILM COATED ORAL AT BEDTIME
Qty: 135 TABLET | Refills: 1 | Status: SHIPPED | OUTPATIENT
Start: 2024-07-18

## 2024-07-18 RX ORDER — LAMOTRIGINE 200 MG/1
200 TABLET ORAL DAILY
Qty: 90 TABLET | Refills: 1 | Status: SHIPPED | OUTPATIENT
Start: 2024-07-18

## 2024-07-18 ASSESSMENT — PAIN SCALES - GENERAL: PAINLEVEL: MILD PAIN (3)

## 2024-07-18 NOTE — Clinical Note
Good morning, I have been treating Namja for quite some time for her symptoms of depression and anxiety.  As I will be retiring in September, I was wondering if you would feel comfortable prescribing her medications for her when she needs new refills.  She is mostly stable, and has a therapist that she meets with regularly.  Thank you for considering this.  Yolanda

## 2024-07-18 NOTE — PROGRESS NOTES
"         Outpatient Psychiatric Progress Note    Name: Najma Carvalho   : 1970                    Primary Care Provider: Cris Gomez MD   Therapist: Mata    PHQ-9 scores:      1/15/2024    11:00 AM 2024     6:58 AM 2024     2:37 PM   PHQ-9 SCORE   PHQ-9 Total Score MyChart  7 (Mild depression) 12 (Moderate depression)   PHQ-9 Total Score 12 7 12       SANTIAGO-7 scores:      1/15/2024    11:00 AM 2024     6:59 AM 2024     2:39 PM   SANTIAGO-7 SCORE   Total Score  8 (mild anxiety) 15 (severe anxiety)   Total Score 21 8 15       Patient Identification:    Patient is a 53 year old year old,   White Not  or  female  who presents for return visit with me.  Patient is currently unemployed. Patient attended the session alone. Patient prefers to be called: \" Najma\".    Current medications include:   Current Outpatient Medications   Medication Sig Dispense Refill    ALPRAZolam (XANAX XR) 0.5 MG 24 hr tablet Take 0.5 mg by mouth PRN      busPIRone (BUSPAR) 10 MG tablet Take 1 tablet (10 mg) by mouth 2 times daily 180 tablet 1    clonazePAM (KLONOPIN) 0.5 MG tablet Take 1/2 tablet (0.25 mg) daily 45 tablet 1    Elemental iron 65 mg Vitamin C 125 mg (VITRON C)  MG TABS tablet Take 1 tablet by mouth daily      hydrOXYzine jean (VISTARIL) 25 MG capsule Take 1-2 capsules (25-50 mg) by mouth 4 times daily as needed for anxiety 120 capsule 0    ibuprofen (ADVIL/MOTRIN) 200 MG tablet Take 200 mg by mouth every 4 hours as needed for pain      lamoTRIgine (LAMICTAL) 200 MG tablet Take 1 tablet (200 mg) by mouth daily 90 tablet 1    loratadine (CLARITIN) 10 MG tablet Take 10 mg by mouth daily      magnesium oxide 400 MG CAPS Take 400 mg by mouth daily      Melatonin 10 MG TABS tablet Take 10 mg by mouth nightly as needed for sleep      propranolol (INDERAL) 10 MG tablet Take 1 tablet (10 mg) by mouth 2 times daily 180 tablet 1    QUEtiapine (SEROQUEL) 50 MG tablet Take 1-2 " tablets ( mg) by mouth at bedtime 135 tablet 1    vitamin D3 (CHOLECALCIFEROL) 50 mcg (2000 units) tablet Take 1 tablet by mouth daily       Current Facility-Administered Medications   Medication Dose Route Frequency Provider Last Rate Last Admin    botulinum toxin type A (BOTOX) 100 units injection 200 Units  200 Units Intramuscular Q90 Days Kimmy Matthews MD        botulinum toxin type A (BOTOX) 100 units injection 250 Units  250 Units Intramuscular Once             The [place] Prescription Monitoring Program has been reviewed and there are no concerns about diversionary activity for controlled substances at this time.      I was able to review most recent Primary Care Provider, specialty provider, and therapy visit notes that I have access to.     Today, patient reports that she has been taking clonazapam 0.25 at night.Going through a divorce.  She has been having flashback trauma memories related to discord with her .  There are over 200 items to be auctioned off on her property as her  had been accumulating excessive amounts of cars and tires among other things.  Her  did not show up for court and was deemed not able to attend due to cognitive decline which she is not sure about what that means.  She continues to see her therapist.  Najma reports being able to sleep better.  She is concerned that there is no protection order in place for her and her daughters.       has no past medical history on file.    Social history updates:    Najma lives with her daughters.  She is unemployed.      Vital Signs:   There were no vitals taken for this visit.    Labs:    Most recent laboratory results reviewed and no new labs.     Mental Status Examination:  Appearance: awake, alert, casual dress, and mild distress  Attitude: cooperative  Eye Contact:  adequate  Gait and Station: No dizziness or falls  Psychomotor Behavior:  intact station, gait and muscle tone  Oriented to:  time, person,  and place  Attention Span and Concentration:  Normal  Speech:   vtspeech: clear, coherent and Speaks: English  Mood:  anxious and depressed  Affect:  mood congruent  Associations:  no loose associations  Thought Process:  goal oriented  Thought Content:  no evidence of suicidal ideation or homicidal ideation, no auditory hallucinations present, and no visual hallucinations present  Recent and Remote Memory:  intact Not formally assessed. No amnesia.  Fund of Knowledge: appropriate  Insight:  good  Judgment: good  Impulse Control:  good    Suicide Risk Assessment:  Today Najma Carvalho reports no thoughts to harm themself or others. In addition, there are notable risk factors for self-harm, including anxiety and withdrawing. However, risk is mitigated by commitment to family, history of seeking help when needed, future oriented, denies suicidal intent or plan, and denies homicidal ideation, intent, or plan. Therefore, based on all available evidence including the factors cited above, Najma Carvalho does not appear to be at imminent risk for self-harm, does not meet criteria for a 72-hr hold, and therefore remains appropriate for ongoing outpatient level of care.  A thorough assessment of risk factors related to suicide and self-harm have been reviewed and are noted above. The patient convincingly denies suicidality on several occasions. Local community safety resources printed and reviewed for patient to use if needed. There was no deceit detected, and the patient presented in a manner that was believable.     DSM5 Diagnosis:  296.89 Bipolar II Disorder With mixed features and moderate  300.01 (F41.0) Panic Disorder  300.02 (F41.1) Generalized Anxiety Disorder    Medical comorbidities include:   Patient Active Problem List    Diagnosis Date Noted    Cervical dystonia 04/21/2023     Priority: Medium    Cervical myofascial pain syndrome 08/05/2022     Priority: Medium    Bipolar disorder (H) 06/24/2022      Priority: Medium    Chronic tension headache 06/24/2022     Priority: Medium    Iron deficiency anemia 06/24/2022     Priority: Medium    Obesity 06/24/2022     Priority: Medium    Oscillopsia 06/24/2022     Priority: Medium    Hemorrhoids 12/02/2021     Priority: Medium    Post-traumatic brain syndrome 08/02/2021     Priority: Medium    Whiplash 01/18/2021     Priority: Medium    GERD with apnea 01/31/2020     Priority: Medium    Hypomania (H) 04/11/2019     Priority: Medium    SANTIAGO (generalized anxiety disorder) 04/10/2019     Priority: Medium    Migraines 04/10/2019     Priority: Medium    Serotonin withdrawal syndrome without complication 04/10/2019     Priority: Medium    Hypersomnolence 03/12/2019     Priority: Medium       Assessment:    Najma Carvalho is still awaiting the outcome of the divorce proceedings.  Her  is living in another part of the state and she has not had contact with him.  She is concerned that there is no protection order in place for her and her daughters.  She is in the process of selling items that are on her property and is saddened that she will need to rebuild the home that she is currently living in.  Therapy with [facility] continues and is helpful to her..    Medication side effects and alternatives were reviewed. Health promotion activities recommended and reviewed today. All questions addressed. Education and counseling completed regarding risks and benefits of medications and psychotherapy options.    Treatment Plan:      1.  Clonazepam 1/2 tab daily for anxiety  2.  Quetiapine 50 - 100 mg at bedtime to help you sleep at night and slow down racing thoughts  3.  Lamotrigine 200 mg daily for mood regulation  4.  Hydroxyzine 25 mg up to 4 doses daily for anxiety  5.  Melatonin 10 mg at  bedtime for insomnia  6.  Buspirone 10 mg twice daily for anxiety  7.  Continue therapy with Mata  8.  Continue alprazolam 0.5 mg as needed when you go to court to lessen anxiety  symptoms    Continue all other medications as reviewed per electronic medical record today.   Safety plan reviewed. To the Emergency Department as needed or call after hours crisis line at [phone] or [phone]. [place] Crisis Text Line. Text [place] to 067121 or Suicide LifeLine Chat: suicidepreFirstCry.comline.org/chat/  To schedule individual or family therapy, call [facility] at [phone]  Schedule an appointment  as needed. Call [facility] at [phone] to schedule.  Follow up with primary care provider as planned or for acute medical concerns.  Call the psychiatric nurse line with medication questions or concerns at [phone]  MyChart may be used to communicate with your provider, but this is not intended to be used for emergencies.        Crisis Resources   The EmPath is an adults only unit located at [facility] is a short term (generally less than 23 hour stay) designed for crisis intervention and stabilization. Pts have the opportunity to meet quickly with a behavioral health team for evaluation in a calm and peaceful therapuetic environment. To be evaluated for admission pts are triaged throught the [facility] ED.      The following hotlines are for both adults and children. The and are open 24 hours a day, 7 days a week unless noted otherwise.        Crisis Lines      Crisis Text Line  Text 996532  You will be connected with a trained live crisis counselor to provide support.        Gambling Hotline  5.943.036.2488 [hope]        línea de crisis española  338.418.8859        [facility]  [phone]        National Hope Line  8.896.257.5867 [hope]        National Suicide Prevention Lifeline  Free and confidential support  988 or 1.664.186.TALK [8255]  http://suicidepreventionlifeline.org        The Sean Project (LGBTQ Youth Crisis Line)  0.773.848.0869  text START to 525-537        's Crisis Line   6.352.809.8049 (Press 1)  or text 512019    [facility] Mental Health Crisis Response  Within [place], call **CRISIS [**726150] to be connected to a mental health professional who can assist you.        [facility] Crisis  [phone]      [facility] Mobile Crisis  [phone]      [facility] Crisis  [phone]      [facility] Mobile Crisis  [phone] (adults)  [phone] (children)      [facility] Mobile Crisis  [phone] (adults)  [phone] (children)      [facility] Mobile Crisis  [phone]      [facility] Mobile Crisis  [phone]    Community Resources      Fast Tracker  Linking people to mental health and substance use disorder resources  "[employer]n.org        [place] Mental Health Warmline  Peer to peer support  5 pm to 9 am 7 days/week  5.062.976.4572  https://mnwitw.org/ellie        National [place] on Mental Illness (ADRI)  734.156.7098 or 1.888.ADRI.HELPS  https://namimn.org/        [facility] Urgent Care for Adult Mental Health  [facility] residents [address]  [phone]        [facility]  Free mental health counseling  https://walkin.org/  612.870.0565 X2    Mental Health Apps      Calm Harm  https://calmharm.co.uk/      My3  https://my3app.org/      Raquel Safety Plan  https://www.mysafetyplan.org/   Administrative Billing:   Time spent with patient includes counseling and coordination of care regarding above diagnoses and treatment plan.    Patient Status:  The patient is being returned to the primary care provider for ongoing care and medication prescribing.      Signed:   KULDEEP ShearerBC   Psychiatry       Answers submitted by the patient for this visit:  Patient Health Questionnaire (Submitted on 7/17/2024)  If you checked off any problems, how difficult have these problems made it for you to do your work, take care of things at home, or get along  with other people?: Somewhat difficult  PHQ9 TOTAL SCORE: 12  SANTIAGO-7 (Submitted on 7/17/2024)  SANTIAGO 7 TOTAL SCORE: 15

## 2024-07-18 NOTE — PATIENT INSTRUCTIONS
"Patient Education   The Panel Psychiatry Program  What to Expect  Here's what to expect in the Panel Psychiatry Program.   About the program  You'll be meeting with a psychiatric doctor to check your mental health. A psychiatric doctor helps you deal with troubling thoughts and feelings by giving you medicine. They'll make sure you know the plan for your care. You may see them for a long time. When you're feeling better, they may refer you back to seeing your family doctor.   If you have any questions, we'll be glad to talk to you.  About visits  Be open  At your visits, please talk openly about your problems. It may feel hard, but it's the best way for us to help you.  Cancelling visits  If you can't come to your visit, please call us right away at [phone]. If you don't cancel at least 24 hours (1 full day) before your visit, that's \"late cancellation.\"  Not showing up for your visits  Being very late is the same as not showing up. You'll be a \"no show\" if:  You're more than 15 minutes late for a 30-minute (half hour) visit.  You're more than 30 minutes late for a 60-minute (full hour) visit.  If you cancel late or don't show up 2 times within 6 months, we may end your care.  Getting help between visits  If you need help between visits, you can call us Monday to Friday from 8 a.m. to 4:30 p.m. at [phone].  Emergency care  Call 911 or go to the nearest emergency department if your life or someone else's life is in danger.  Call 988 anytime to reach the national Suicide and Crisis hotline.  Medicine refills  To refill your medicine, call your pharmacy. You can also call [facility]'s Behavioral Access at [phone], Monday to Friday, 8 a.m. to 4:30 p.m. It can take 1 to 3 business days to get a refill.   Forms, letters, and tests  You may have papers to fill out, like FMLA, short-term disability, and workability. We can help you with these forms at your visits, but you must have an " appointment. You may need more than 1 visit for this, to be in an intensive therapy program, or both.  Before we can give you medicine for ADHD, we may refer you to get tested for it or confirm it another way.  We may not be able to give you an emotional support animal letter.  We don't do mental health checks ordered by the court.   We don't do mental health testing, but we can refer you to get tested.   Thank you for choosing us for your care.  For informational purposes only. Not to replace the advice of your health care provider. Copyright   2022 [facility]. All rights reserved. [employer] 856112 - 12/22.       Treatment Plan:      1.  Clonazepam 1/2 tab daily  2.  Quetiapine 50 - 100 mg at bedtime  3.  Lamotrigine 200 mg daily  4.  Hydroxyzine 25 mg up to 4 doses daily  5.  Melatonin 10 mg at  bedtime  6.  Buspirone 10 mg twice daily  7.  Continue therapy with Mata  8.  Continue Alprazolam 0.5 mg as needed for going to court  9.  I will contact Cris to see about having you get your refills from her.       Continue all other medical directions per primary care provider.   Continue all other medications as reviewed per electronic medical record today.   Safety plan reviewed. To the Emergency Department as needed or call after hours crisis line at [phone] or [phone]. [place] Crisis Text Line: Text [place] to 405433  or  Suicide LifeLine Chat: suicidepreventionlifeline.org/chat/  To schedule individual or family therapy, call [facility] at [phone].   Schedule an appointment as needed.  Call [facility] at [phone] to schedule.  Follow up with primary care provider as planned or for acute medical concerns.  Call the psychiatric nurse line with medication questions or concerns at [phone].  MyChart may be used to communicate with your provider, but this is not intended to be used for emergencies.        Crisis Resources   The EmPath is an adults  only unit located at [facility] in [place] is a short term (generally less than 23 hour stay) designed for crisis intervention and stabilization. Pts have the opportunity to meet quickly with a behavioral health team for evaluation in a calm and peaceful therapuetic environment. To be evaluated for admission pts are triaged throught the [facility] ED.      The following hotlines are for both adults and children. The and are open 24 hours a day, 7 days a week unless noted otherwise.        Crisis Lines      Crisis Text Line  Text 044693  You will be connected with a trained live crisis counselor to provide support.        Gambling Hotline  3.808.519.6483 [hope]        línea de crisis española  114.387.5816        [facility] Helpline  [phone]        National Hope Line  2.038.991.7513 [hope]        National Suicide Prevention Lifeline  Free and confidential support  988 or 1.079.002.TALK [8255]  http://suicidepreventionlifeline.org        The Sean Project (LGBTQ Youth Crisis Line)  1.884.730.7320  text START to 198-441        's Crisis Line  5.458.001.6040 (Press 1)  or text 934862    [facility] Mental Health Crisis Response  Within [place], call **CRISIS [**196912] to be connected to a mental health professional who can assist you.        [facility] Crisis  [phone]      [facility] Mobile Crisis  [phone]      [facility] Crisis  [phone]      [facility] Mobile Crisis  [phone] (adults)  [phone] (children)      Psychiatric Mobile Crisis  [phone] (adults)  [phone] (children)      [facility] Mobile Crisis  [phone]      [facility] Mobile Crisis  [phone]    Community Resources      Fast Tracker  Linking people to mental health and substance use disorder resources  fasttrackDatahugn.org        [place] Mental Health Warmline  Peer to peer support  5 pm to 9 am 7 days/week  1.020.248.3714   https://mnwitw.org/ellie        National [place] on Mental Illness (ADRI)  895.635.6723 or 1.888.ADRI.HELPS  https://namimn.org/        [facility] Urgent Care for Adult Mental Health  [facility]   [address] [facility]  [phone]        [facility]  Free mental health counseling  https://walkin.org/  612.870.0565 X2    Mental Health Apps      Calm Harm  https://calmharm.co.uk/      My3  https://my3app.org/      [place] Safety Plan  https://www.mysafetyplan.org/

## 2024-07-18 NOTE — PROGRESS NOTES
"  Mental Health and Collaborative Care Psychiatry Service Rooming Note      Most pressing mental health concern at this time: \" Stressed\"    did not show for the court on Monday dt \" altered mental status\"      Any new physical health conditions or diagnoses affecting you that we should be aware of: Wt gain      Side effects related to medications patient would like to discuss with the provider:  Vistaril depresses her system too much      Are you taking your medications as prescribed?  yes        Do you need refills of any of the medications?  Lamotrigine & Alprazolam        Are you taking any recreational substances? none      Is there any chance you are pregnant? no menses since 9/2023   Do you use birth control? NO      Provider notified  N/A      Add attendance guidelines : pt aware        Mai Stanton LPN  July 18, 2024  8:49 AM        "

## 2024-10-03 DIAGNOSIS — I10 BENIGN ESSENTIAL HYPERTENSION: ICD-10-CM

## 2024-10-03 DIAGNOSIS — F41.1 GAD (GENERALIZED ANXIETY DISORDER): ICD-10-CM

## 2024-10-03 RX ORDER — PROPRANOLOL HYDROCHLORIDE 10 MG/1
10 TABLET ORAL 2 TIMES DAILY
Qty: 180 TABLET | Refills: 0 | Status: SHIPPED | OUTPATIENT
Start: 2024-10-03

## 2025-02-15 ENCOUNTER — HEALTH MAINTENANCE LETTER (OUTPATIENT)
Age: 55
End: 2025-02-15

## 2025-06-06 ENCOUNTER — MEDICAL CORRESPONDENCE (OUTPATIENT)
Dept: HEALTH INFORMATION MANAGEMENT | Facility: CLINIC | Age: 55
End: 2025-06-06
Payer: MEDICAID

## 2025-06-09 ENCOUNTER — TRANSCRIBE ORDERS (OUTPATIENT)
Dept: OTHER | Age: 55
End: 2025-06-09

## 2025-06-09 DIAGNOSIS — G43.909 MIGRAINE SYNDROME: Primary | ICD-10-CM

## 2025-06-10 ENCOUNTER — PATIENT OUTREACH (OUTPATIENT)
Dept: CARE COORDINATION | Facility: CLINIC | Age: 55
End: 2025-06-10
Payer: MEDICAID

## 2025-06-12 ENCOUNTER — PATIENT OUTREACH (OUTPATIENT)
Dept: CARE COORDINATION | Facility: CLINIC | Age: 55
End: 2025-06-12
Payer: MEDICAID